# Patient Record
Sex: MALE | Race: WHITE | NOT HISPANIC OR LATINO | Employment: OTHER | URBAN - METROPOLITAN AREA
[De-identification: names, ages, dates, MRNs, and addresses within clinical notes are randomized per-mention and may not be internally consistent; named-entity substitution may affect disease eponyms.]

---

## 2017-01-04 DIAGNOSIS — J44.9 COPD (CHRONIC OBSTRUCTIVE PULMONARY DISEASE): ICD-10-CM

## 2017-01-04 DIAGNOSIS — R53.83 FATIGUE: ICD-10-CM

## 2017-01-04 DIAGNOSIS — G89.29 CHRONIC HEADACHES: Primary | ICD-10-CM

## 2017-01-04 DIAGNOSIS — R51.9 CHRONIC HEADACHES: Primary | ICD-10-CM

## 2017-01-05 ENCOUNTER — HOSPITAL ENCOUNTER (OUTPATIENT)
Dept: RADIOLOGY | Facility: HOSPITAL | Age: 52
Discharge: HOME | End: 2017-01-05
Attending: INTERNAL MEDICINE
Payer: MEDICARE

## 2017-01-05 ENCOUNTER — HOSPITAL ENCOUNTER (OUTPATIENT)
Dept: RESPIRATORY THERAPY | Facility: HOSPITAL | Age: 52
Discharge: HOME | End: 2017-01-05
Attending: INTERNAL MEDICINE
Payer: MEDICARE

## 2017-01-05 DIAGNOSIS — R53.83 FATIGUE: ICD-10-CM

## 2017-01-05 DIAGNOSIS — G89.29 CHRONIC HEADACHES: ICD-10-CM

## 2017-01-05 DIAGNOSIS — R51.9 CHRONIC HEADACHES: ICD-10-CM

## 2017-01-05 DIAGNOSIS — J44.9 COPD (CHRONIC OBSTRUCTIVE PULMONARY DISEASE): ICD-10-CM

## 2017-01-05 PROCEDURE — 94727 GAS DIL/WSHOT DETER LNG VOL: CPT | Mod: 26,,, | Performed by: INTERNAL MEDICINE

## 2017-01-05 PROCEDURE — 94727 GAS DIL/WSHOT DETER LNG VOL: CPT

## 2017-01-05 PROCEDURE — 94060 EVALUATION OF WHEEZING: CPT

## 2017-01-05 PROCEDURE — 94729 DIFFUSING CAPACITY: CPT

## 2017-01-05 PROCEDURE — 70551 MRI BRAIN STEM W/O DYE: CPT | Mod: TC

## 2017-01-05 PROCEDURE — 94060 EVALUATION OF WHEEZING: CPT | Mod: 26,,, | Performed by: INTERNAL MEDICINE

## 2017-01-05 PROCEDURE — 94729 DIFFUSING CAPACITY: CPT | Mod: 26,,, | Performed by: INTERNAL MEDICINE

## 2017-01-05 PROCEDURE — 70551 MRI BRAIN STEM W/O DYE: CPT | Mod: 26,,, | Performed by: RADIOLOGY

## 2017-01-18 NOTE — PROCEDURES
Pulmonary Functions, including spirometry and bronchodilator response and lung volumes and diffusion, study was done Jan 5, 2017.  Spirometry shows severe obstruction and bronchodilator response.   FEV1 is 24% or 0.75 liters.  Lung volumes show  normal TLC and elevated residual volume.  Diffusion shows reduced to 46% uncorrected for anemia if present..    Pulmonary functions show very severe obstruction with bd response and low dlco. Clinical correlation recommended.     Dallas Ocampo M.D.      Copd stages by spirometry with fev1 / fvc < 0.70 (present if report indicates obstruction)     Mild Fev1 80% or more   Moderate 50% to 79%   Severe 30% to 49%   Very severe < 30%

## 2017-03-10 ENCOUNTER — HOSPITAL ENCOUNTER (OUTPATIENT)
Dept: RADIOLOGY | Facility: HOSPITAL | Age: 52
Discharge: HOME | End: 2017-03-10
Attending: INTERNAL MEDICINE
Payer: MEDICARE

## 2017-03-10 DIAGNOSIS — R10.11 RUQ PAIN: ICD-10-CM

## 2017-03-10 PROCEDURE — 76705 ECHO EXAM OF ABDOMEN: CPT | Mod: 26,,, | Performed by: RADIOLOGY

## 2017-03-10 PROCEDURE — 76705 ECHO EXAM OF ABDOMEN: CPT | Mod: TC

## 2017-09-28 DIAGNOSIS — J44.9 COPD (CHRONIC OBSTRUCTIVE PULMONARY DISEASE): Primary | ICD-10-CM

## 2017-09-29 ENCOUNTER — HOSPITAL ENCOUNTER (OUTPATIENT)
Dept: RADIOLOGY | Facility: HOSPITAL | Age: 52
Discharge: HOME | End: 2017-09-29
Attending: INTERNAL MEDICINE
Payer: MEDICARE

## 2017-09-29 DIAGNOSIS — J44.9 COPD (CHRONIC OBSTRUCTIVE PULMONARY DISEASE): ICD-10-CM

## 2017-09-29 PROCEDURE — 71020 XR CHEST PA AND LATERAL: CPT | Mod: 26,,, | Performed by: RADIOLOGY

## 2017-09-29 PROCEDURE — 71020 XR CHEST PA AND LATERAL: CPT | Mod: TC

## 2018-07-26 ENCOUNTER — HOSPITAL ENCOUNTER (OUTPATIENT)
Dept: RADIOLOGY | Facility: HOSPITAL | Age: 53
Discharge: HOME | End: 2018-07-26
Attending: INTERNAL MEDICINE
Payer: MEDICARE

## 2018-07-26 DIAGNOSIS — J44.89 OBSTRUCTIVE CHRONIC BRONCHITIS WITHOUT EXACERBATION: ICD-10-CM

## 2018-07-26 DIAGNOSIS — J44.89 OBSTRUCTIVE CHRONIC BRONCHITIS WITHOUT EXACERBATION: Primary | ICD-10-CM

## 2018-07-26 PROCEDURE — 71046 X-RAY EXAM CHEST 2 VIEWS: CPT | Mod: TC,FY

## 2018-07-26 PROCEDURE — 71046 X-RAY EXAM CHEST 2 VIEWS: CPT | Mod: 26,,, | Performed by: RADIOLOGY

## 2018-11-16 DIAGNOSIS — I65.23 ATHEROSCLEROSIS OF BOTH CAROTID ARTERIES: ICD-10-CM

## 2018-11-16 DIAGNOSIS — I25.10 ASCVD (ARTERIOSCLEROTIC CARDIOVASCULAR DISEASE): Primary | ICD-10-CM

## 2018-11-19 ENCOUNTER — HOSPITAL ENCOUNTER (OUTPATIENT)
Dept: RADIOLOGY | Facility: HOSPITAL | Age: 53
Discharge: HOME | End: 2018-11-19
Attending: INTERNAL MEDICINE
Payer: MEDICARE

## 2018-11-19 DIAGNOSIS — I65.23 ATHEROSCLEROSIS OF BOTH CAROTID ARTERIES: ICD-10-CM

## 2018-11-19 PROCEDURE — 93880 EXTRACRANIAL BILAT STUDY: CPT | Mod: TC

## 2018-11-19 PROCEDURE — 93880 EXTRACRANIAL BILAT STUDY: CPT | Mod: 26,,, | Performed by: RADIOLOGY

## 2019-01-21 ENCOUNTER — HOSPITAL ENCOUNTER (OUTPATIENT)
Dept: RADIOLOGY | Facility: HOSPITAL | Age: 54
Discharge: HOME | End: 2019-01-21
Attending: INTERNAL MEDICINE
Payer: MEDICARE

## 2019-01-21 DIAGNOSIS — R06.02 SHORTNESS OF BREATH: Primary | ICD-10-CM

## 2019-01-21 DIAGNOSIS — R06.02 SHORTNESS OF BREATH: ICD-10-CM

## 2019-01-21 PROCEDURE — 71046 X-RAY EXAM CHEST 2 VIEWS: CPT | Mod: 26,,, | Performed by: RADIOLOGY

## 2019-01-21 PROCEDURE — 71046 X-RAY EXAM CHEST 2 VIEWS: CPT | Mod: TC,FY

## 2019-01-21 PROCEDURE — 71046 XR CHEST PA AND LATERAL: ICD-10-PCS | Mod: 26,,, | Performed by: RADIOLOGY

## 2019-08-14 ENCOUNTER — TELEPHONE (OUTPATIENT)
Dept: TRANSPLANT | Facility: CLINIC | Age: 54
End: 2019-08-14

## 2019-08-14 DIAGNOSIS — J44.9 CHRONIC OBSTRUCTIVE PULMONARY DISEASE, UNSPECIFIED COPD TYPE: Primary | ICD-10-CM

## 2019-08-14 DIAGNOSIS — Z76.82 LUNG TRANSPLANT CANDIDATE: ICD-10-CM

## 2019-08-14 NOTE — LETTER
08/22/2019    Kush Ibanez  [address]  [facility] [address]  Phone: [phone]  Fax: [phone]         Dear Dr. Kush Ibanez    Patient: Erick Graves Jr.     MR Number: [id number]     YOB: 1965       Thank you for the referral of Erick Graves Jr. to our lung transplant program. An initial appointment with the transplant team has been scheduled for 9/12/19. You will receive an after-visit summary following the completion of your patients appointment in our clinic.    Thank you again for your trust in our program. If there is anything we can do for you or your staff, please feel free to contact us at [phone].    Sincerely,         Sharif Rao MD   Director, Lung Transplantation   Pulmonary & Critical Care Medicine    [facility]  [address] [place] [id number]  [phone]

## 2019-08-14 NOTE — TELEPHONE ENCOUNTER
LM for patient regarding scheduling consult appointment.    Pt's wife returned my call.  He will be scheduled for 9/12 based on testing availability.  Scheduling card placed on 's desk.    [facility]   Caller: Eli / Wife [phone] (Today, 11:54 AM)             Eli called to confirm if a referral was received last week from Dr. Tyler Ibanez. Eli can be reached at [phone].      Explained that I do have the referral, however was waiting on additional info from the referring MD's nurse.  Explained that if patient is still smoking, he will need to be abstinent for at least 6 months to move forward with evaluation.  She states he is aware of that and has not smoked since last week.  Explained that if patient is a candidate to move forward with evaluation testing for possible listing, he will need a Medicare supplement in order to do so.  Explained that open enrollment starts in October for a plan to begin in January.  Will review with Dr. Rao.

## 2019-09-12 ENCOUNTER — HOSPITAL ENCOUNTER (OUTPATIENT)
Dept: CARDIOLOGY | Facility: CLINIC | Age: 54
Discharge: HOME | End: 2019-09-12
Attending: INTERNAL MEDICINE
Payer: MEDICARE

## 2019-09-12 ENCOUNTER — HOSPITAL ENCOUNTER (OUTPATIENT)
Dept: RADIOLOGY | Facility: HOSPITAL | Age: 54
Discharge: HOME | End: 2019-09-12
Attending: INTERNAL MEDICINE
Payer: MEDICARE

## 2019-09-12 ENCOUNTER — INITIAL CONSULT (OUTPATIENT)
Dept: TRANSPLANT | Facility: CLINIC | Age: 54
End: 2019-09-12
Payer: MEDICARE

## 2019-09-12 ENCOUNTER — HOSPITAL ENCOUNTER (OUTPATIENT)
Dept: PULMONOLOGY | Facility: CLINIC | Age: 54
Discharge: HOME | End: 2019-09-12
Payer: MEDICARE

## 2019-09-12 VITALS
HEIGHT: 67 IN | DIASTOLIC BLOOD PRESSURE: 80 MMHG | BODY MASS INDEX: 26.33 KG/M2 | WEIGHT: 167.75 LBS | BODY MASS INDEX: 28.25 KG/M2 | WEIGHT: 180 LBS | HEIGHT: 67 IN | SYSTOLIC BLOOD PRESSURE: 120 MMHG | HEART RATE: 60 BPM

## 2019-09-12 VITALS
TEMPERATURE: 98 F | HEART RATE: 60 BPM | WEIGHT: 167 LBS | BODY MASS INDEX: 26.21 KG/M2 | HEIGHT: 67 IN | OXYGEN SATURATION: 98 % | RESPIRATION RATE: 20 BRPM | DIASTOLIC BLOOD PRESSURE: 60 MMHG | SYSTOLIC BLOOD PRESSURE: 114 MMHG

## 2019-09-12 DIAGNOSIS — Z76.82 LUNG TRANSPLANT CANDIDATE: ICD-10-CM

## 2019-09-12 DIAGNOSIS — J44.9 CHRONIC OBSTRUCTIVE PULMONARY DISEASE, UNSPECIFIED COPD TYPE: ICD-10-CM

## 2019-09-12 DIAGNOSIS — J44.9 CHRONIC OBSTRUCTIVE PULMONARY DISEASE, UNSPECIFIED COPD TYPE: Primary | ICD-10-CM

## 2019-09-12 LAB
ASCENDING AORTA: 3.13 CM
AV INDEX (PROSTH): 0.71
AV MEAN GRADIENT: 2 MMHG
AV PEAK GRADIENT: 4 MMHG
AV VALVE AREA: 2.29 CM2
AV VELOCITY RATIO: 0.75
BSA FOR ECHO PROCEDURE: 1.96 M2
CV ECHO LV RWT: 0.5 CM
DOP CALC AO PEAK VEL: 1 M/S
DOP CALC AO VTI: 23.54 CM
DOP CALC LVOT AREA: 3.2 CM2
DOP CALC LVOT DIAMETER: 2.03 CM
DOP CALC LVOT PEAK VEL: 0.75 M/S
DOP CALC LVOT STROKE VOLUME: 53.93 CM3
DOP CALCLVOT PEAK VEL VTI: 16.67 CM
E WAVE DECELERATION TIME: 153.89 MSEC
E/A RATIO: 1.07
ECHO LV POSTERIOR WALL: 0.83 CM (ref 0.6–1.1)
FRACTIONAL SHORTENING: 16 % (ref 28–44)
INTERVENTRICULAR SEPTUM: 0.79 CM (ref 0.6–1.1)
IVRT: 0.11 MSEC
LA MAJOR: 5.54 CM
LA MINOR: 5.37 CM
LA WIDTH: 4.8 CM
LEFT ATRIUM SIZE: 4.16 CM
LEFT ATRIUM VOLUME INDEX: 47.9 ML/M2
LEFT ATRIUM VOLUME: 92.56 CM3
LEFT INTERNAL DIMENSION IN SYSTOLE: 2.78 CM (ref 2.1–4)
LEFT VENTRICLE DIASTOLIC VOLUME INDEX: 22.65 ML/M2
LEFT VENTRICLE DIASTOLIC VOLUME: 43.79 ML
LEFT VENTRICLE MASS INDEX: 36 G/M2
LEFT VENTRICLE SYSTOLIC VOLUME INDEX: 15 ML/M2
LEFT VENTRICLE SYSTOLIC VOLUME: 28.93 ML
LEFT VENTRICULAR INTERNAL DIMENSION IN DIASTOLE: 3.29 CM (ref 3.5–6)
LEFT VENTRICULAR MASS: 69.46 G
MV PEAK A VEL: 0.59 M/S
MV PEAK E VEL: 0.63 M/S
PULM VEIN S/D RATIO: 1.38
PV PEAK D VEL: 0.39 M/S
PV PEAK S VEL: 0.54 M/S
RA MAJOR: 4.61 CM
RA PRESSURE: 3 MMHG
RA WIDTH: 4.03 CM
RIGHT VENTRICULAR END-DIASTOLIC DIMENSION: 3.45 CM
RV TISSUE DOPPLER FREE WALL SYSTOLIC VELOCITY 1 (APICAL 4 CHAMBER VIEW): 9.56 CM/S
SINUS: 3.49 CM
STJ: 3.02 CM
TRICUSPID ANNULAR PLANE SYSTOLIC EXCURSION: 2.02 CM

## 2019-09-12 PROCEDURE — 71250 CT THORAX DX C-: CPT | Mod: TC,TXP

## 2019-09-12 PROCEDURE — 94618 PULMONARY STRESS TESTING: CPT | Mod: PBBFAC | Performed by: INTERNAL MEDICINE

## 2019-09-12 PROCEDURE — 99204 PR OFFICE/OUTPT VISIT, NEW, LEVL IV, 45-59 MIN: ICD-10-PCS | Mod: 25,S$PBB,TXP, | Performed by: INTERNAL MEDICINE

## 2019-09-12 PROCEDURE — 94729 PR C02/MEMBANE DIFFUSE CAPACITY: ICD-10-PCS | Mod: 26,S$PBB,, | Performed by: INTERNAL MEDICINE

## 2019-09-12 PROCEDURE — 94727 GAS DIL/WSHOT DETER LNG VOL: CPT | Mod: 26,S$PBB,, | Performed by: INTERNAL MEDICINE

## 2019-09-12 PROCEDURE — 93306 TRANSTHORACIC ECHO (TTE) COMPLETE (CUPID ONLY): ICD-10-PCS | Mod: 26,S$PBB,NTX, | Performed by: INTERNAL MEDICINE

## 2019-09-12 PROCEDURE — 94618 PULMONARY STRESS TESTING: CPT | Mod: 26,S$PBB,, | Performed by: INTERNAL MEDICINE

## 2019-09-12 PROCEDURE — 71250 CT THORAX DX C-: CPT | Mod: 26,,, | Performed by: RADIOLOGY

## 2019-09-12 PROCEDURE — 93306 TTE W/DOPPLER COMPLETE: CPT | Mod: PBBFAC,NTX | Performed by: INTERNAL MEDICINE

## 2019-09-12 PROCEDURE — 94727 GAS DIL/WSHOT DETER LNG VOL: CPT | Mod: PBBFAC | Performed by: INTERNAL MEDICINE

## 2019-09-12 PROCEDURE — 99999 PR PBB SHADOW E&M-EST. PATIENT-LVL III: ICD-10-PCS | Mod: PBBFAC,TXP,, | Performed by: INTERNAL MEDICINE

## 2019-09-12 PROCEDURE — 94618 PULMONARY STRESS TESTING: ICD-10-PCS | Mod: 26,S$PBB,, | Performed by: INTERNAL MEDICINE

## 2019-09-12 PROCEDURE — 94727 PR PULM FUNCTION TEST BY GAS: ICD-10-PCS | Mod: 26,S$PBB,, | Performed by: INTERNAL MEDICINE

## 2019-09-12 PROCEDURE — 94729 DIFFUSING CAPACITY: CPT | Mod: 26,S$PBB,, | Performed by: INTERNAL MEDICINE

## 2019-09-12 PROCEDURE — 94010 BREATHING CAPACITY TEST: CPT | Mod: PBBFAC | Performed by: INTERNAL MEDICINE

## 2019-09-12 PROCEDURE — 71250 CT CHEST WITHOUT CONTRAST: ICD-10-PCS | Mod: 26,,, | Performed by: RADIOLOGY

## 2019-09-12 PROCEDURE — 99999 PR PBB SHADOW E&M-EST. PATIENT-LVL III: CPT | Mod: PBBFAC,TXP,, | Performed by: INTERNAL MEDICINE

## 2019-09-12 PROCEDURE — 94010 BREATHING CAPACITY TEST: ICD-10-PCS | Mod: 26,S$PBB,, | Performed by: INTERNAL MEDICINE

## 2019-09-12 PROCEDURE — 94010 BREATHING CAPACITY TEST: CPT | Mod: 26,S$PBB,, | Performed by: INTERNAL MEDICINE

## 2019-09-12 PROCEDURE — 99204 OFFICE O/P NEW MOD 45 MIN: CPT | Mod: 25,S$PBB,TXP, | Performed by: INTERNAL MEDICINE

## 2019-09-12 PROCEDURE — 94729 DIFFUSING CAPACITY: CPT | Mod: PBBFAC | Performed by: INTERNAL MEDICINE

## 2019-09-12 PROCEDURE — 99213 OFFICE O/P EST LOW 20 MIN: CPT | Mod: PBBFAC,25,TXP | Performed by: INTERNAL MEDICINE

## 2019-09-12 RX ORDER — ATENOLOL 25 MG/1
1 TABLET ORAL DAILY
COMMUNITY
End: 2021-06-02 | Stop reason: SDUPTHER

## 2019-09-12 RX ORDER — FLUOXETINE HYDROCHLORIDE 20 MG/1
1 CAPSULE ORAL DAILY
COMMUNITY
Start: 2019-09-06 | End: 2021-06-02

## 2019-09-12 RX ORDER — ALBUTEROL SULFATE 90 UG/1
1-2 AEROSOL, METERED RESPIRATORY (INHALATION)
COMMUNITY
Start: 2019-09-03 | End: 2022-06-14

## 2019-09-12 RX ORDER — CLINDAMYCIN HYDROCHLORIDE 300 MG/1
1 CAPSULE ORAL 3 TIMES DAILY
Refills: 0 | COMMUNITY
Start: 2019-08-30 | End: 2021-06-02

## 2019-09-12 NOTE — LETTER
September 13, 2019        Kush Ibanez  [address] [facility] [address]  Phone: [phone]  Fax: [phone]             [facility]  [address]  [facility] [address]  Phone: [phone]   Patient: Erick Graves Jr.   MR Number: [id number]   YOB: 1965   Date of Visit: 9/12/2019       Dear Dr. Kush Ibanez    Thank you for referring Erick Graves to me for evaluation. Attached you will find relevant portions of my assessment and plan of care.    If you have questions, please do not hesitate to call me. I look forward to following Erick Graves along with you.    Sincerely,    Sharif Rao MD    Enclosure    If you would like to receive this communication electronically, please contact [email or web address] or [phone] to request [employer] Link access.    [employer] is a tool which provides read-only access to select patient information with whom you have a relationship. Its easy to use and provides real time access to review your patients record including encounter summaries, notes, results, and demographic information.    If you feel you have received this communication in error or would no longer like to receive these types of communications, please e-mail [email or web address]

## 2019-09-13 LAB
DLCO ADJ PRE: 18.76 ML/(MIN*MMHG) (ref 20.62–34.48)
DLCO SINGLE BREATH LLN: 20.62
DLCO SINGLE BREATH PRE REF: 68.1 %
DLCO SINGLE BREATH REF: 27.55
DLCOC SBVA LLN: 2.93
DLCOC SBVA PRE REF: 92 %
DLCOC SBVA REF: 4.24
DLCOC SINGLE BREATH LLN: 20.62
DLCOC SINGLE BREATH PRE REF: 68.1 %
DLCOC SINGLE BREATH REF: 27.55
DLCOCSBVAULN: 5.54
DLCOCSINGLEBREATHULN: 34.48
DLCOSINGLEBREATHULN: 34.48
DLCOVA LLN: 2.93
DLCOVA PRE REF: 92 %
DLCOVA PRE: 3.9 ML/(MIN*MMHG*L) (ref 2.93–5.54)
DLCOVA REF: 4.24
DLCOVAULN: 5.54
DLVAADJ PRE: 3.9 ML/(MIN*MMHG*L) (ref 2.93–5.54)
ERVN2 LLN: 1.18
ERVN2 PRE REF: 17 %
ERVN2 PRE: 0.2 L (ref 1.18–1.18)
ERVN2 REF: 1.18
ERVN2ULN: 1.18
FEF 25 75 LLN: 1.56
FEF 25 75 PRE REF: 11.4 %
FEF 25 75 REF: 3.03
FEV05 LLN: 1.47
FEV05 REF: 2.61
FEV1 FVC LLN: 67
FEV1 FVC PRE REF: 53 %
FEV1 FVC REF: 79
FEV1 LLN: 2.61
FEV1 PRE REF: 25.9 %
FEV1 REF: 3.4
FRCN2 LLN: 2.4
FRCN2 PRE REF: 108.5 %
FRCN2 REF: 3.38
FRCN2ULN: 4.37
FVC LLN: 3.33
FVC PRE REF: 48.8 %
FVC REF: 4.32
IVC PRE: 2.07 L (ref 3.33–5.3)
IVC SINGLE BREATH LLN: 3.33
IVC SINGLE BREATH PRE REF: 47.9 %
IVC SINGLE BREATH REF: 4.32
IVCSINGLEBREATHULN: 5.3
PEF LLN: 6.76
PEF PRE REF: 23.3 %
PEF REF: 8.88
PRE DLCO: 18.76 ML/(MIN*MMHG) (ref 20.62–34.48)
PRE FEF 25 75: 0.35 L/S (ref 1.56–4.49)
PRE FET 100: 8.82 SEC
PRE FEV05 REF: 22.3 %
PRE FEV1 FVC: 41.76 % (ref 67.25–90.31)
PRE FEV1: 0.88 L (ref 2.61–4.19)
PRE FEV5: 0.58 L (ref 1.47–3.74)
PRE FRC N2: 3.67 L
PRE FVC: 2.1 L (ref 3.33–5.3)
PRE PEF: 2.06 L/S (ref 6.76–11)
RVN2 LLN: 1.53
RVN2 PRE REF: 157.2 %
RVN2 PRE: 3.47 L (ref 1.53–2.88)
RVN2 REF: 2.21
RVN2TLCN2 LLN: 26.43
RVN2TLCN2 PRE REF: 177.2 %
RVN2TLCN2 PRE: 62.75 % (ref 26.43–44.39)
RVN2TLCN2 REF: 35.41
RVN2TLCN2ULN: 44.39
RVN2ULN: 2.88
TLCN2 LLN: 5.35
TLCN2 PRE REF: 85 %
TLCN2 PRE: 5.53 L (ref 5.35–7.65)
TLCN2 REF: 6.5
TLCN2ULN: 7.65
VA PRE: 4.82 L (ref 6.35–6.35)
VA SINGLE BREATH LLN: 6.35
VA SINGLE BREATH PRE REF: 75.8 %
VA SINGLE BREATH REF: 6.35
VASINGLEBREATHULN: 6.35
VCMAXN2 LLN: 3.33
VCMAXN2 PRE REF: 47.7 %
VCMAXN2 PRE: 2.06 L (ref 3.33–5.3)
VCMAXN2 REF: 4.32
VCMAXN2ULN: 5.3

## 2019-09-13 NOTE — PROGRESS NOTES
LUNG TRANSPLANT INITIAL EVALUATION                                                                                                                                           Reason for Visit:  Evaluation for lung transplant    Referring Physician: Kush Ibanez MD    History of Present Illness: Erick Graves Jr. is a 54 y.o. male who is on 0L of oxygen.  He is on no assisted ventilation.  His New York Heart Association Class is II and a Karnofsky score of 80% - Normal activity with effort: some symptoms of disease. He is not diabetic. He presents today for his initial evaluation for lung transplant. He carries a diagnosis of COPD.     His diagnosis was made 16 years ago. He says that at that moment he developed right sided chest pain and went to the ED where it was thought he was having a pulmonary embolism. During the evaluation it was determined that he did not have a PE and what he had was COPD with an acute infectious process. He was prescribed antibiotics and a rescue inhaler. He did not have other issues with his condition until six years ago when he was hospitalized for it. He did not require BiPAP or ICU stay during his admission. It has been the only hospitalization he's had for his condition. He now uses Symbicort for his condition.     His symptoms are shortness of breath on exertion and a cough which he describes more as a clearing of his throat. Denies fever, chest pain, night sweats, or weight loss. He says that about three years ago a nodule was found on his lungs but it was ruled as benign.     He has an 80 pack year smoking history and quit 1 month ago.     Past Medical History:   Diagnosis Date    COPD (chronic obstructive pulmonary disease)     Hepatitis     As a child turned jaundice    Hypertension     Peptic ulcer disease        History reviewed. No pertinent surgical history.    Allergies: Codeine; Crestor [rosuvastatin]; Lipitor [atorvastatin]; and Penicillins    Current  Outpatient Medications   Medication Sig    alprazolam (XANAX) 1 MG tablet Take 1 tablet (1 mg total) by mouth 2 (two) times daily.    aspirin (ASPIRIN LOW DOSE) 81 MG EC tablet Every day    atenolol (TENORMIN) 25 MG tablet Take 1 tablet by mouth once daily.    budesonide-formoterol 160-4.5 mcg (SYMBICORT) 160-4.5 mcg/actuation HFAA Inhale 2 puffs into the lungs every 12 (twelve) hours.    clindamycin (CLEOCIN) 300 MG capsule Take 1 capsule by mouth 3 (three) times daily.    FLUoxetine 20 MG capsule Take 1 capsule by mouth once daily.    nicotine polacrilex (COMMIT) 2 MG Lozg Take 2 mg by mouth as needed.    ranitidine (ZANTAC) 150 MG tablet Take 150 mg by mouth as needed for Heartburn.    VENTOLIN HFA 90 mcg/actuation inhaler Inhale 1-2 puffs into the lungs every 4 to 6 hours as needed.     No current facility-administered medications for this visit.          There is no immunization history on file for this patient.  Family History:  History reviewed. No pertinent family history.  Social History     Substance and Sexual Activity   Alcohol Use No    Comment: occasional      Social History     Substance and Sexual Activity   Drug Use No    Comment: 18 - cocaine and crack use - quit 17-18 years ago      Social History     Socioeconomic History    Marital status:      Spouse name: Not on file    Number of children: Not on file    Years of education: Not on file    Highest education level: Not on file   Occupational History    Not on file   Social Needs    Financial resource strain: Not on file    Food insecurity:     Worry: Not on file     Inability: Not on file    Transportation needs:     Medical: Not on file     Non-medical: Not on file   Tobacco Use    Smoking status: Former Smoker     Packs/day: 0.50     Years: 34.00     Pack years: 17.00     Types: Cigarettes     Last attempt to quit: 2019     Years since quittin.0    Smokeless tobacco: Never Used   Substance and Sexual  "Activity    Alcohol use: No     Comment: occasional    Drug use: No     Comment: 9/12/19 - cocaine and crack use - quit 17-18 years ago    Sexual activity: Not on file   Lifestyle    Physical activity:     Days per week: Not on file     Minutes per session: Not on file    Stress: Not on file   Relationships    Social connections:     Talks on phone: Not on file     Gets together: Not on file     Attends Rastafarian service: Not on file     Active member of club or organization: Not on file     Attends meetings of clubs or organizations: Not on file     Relationship status: Not on file   Other Topics Concern    Not on file   Social History Narrative    Not on file     Review of Systems   Constitutional: Negative for chills, diaphoresis, fever, malaise/fatigue and weight loss.   HENT: Negative for congestion, ear discharge, ear pain, hearing loss, nosebleeds and sore throat.    Eyes: Negative for blurred vision, double vision and photophobia.   Respiratory: Positive for cough and shortness of breath. Negative for hemoptysis, sputum production and wheezing.    Cardiovascular: Negative for chest pain, palpitations, orthopnea, claudication, leg swelling and PND.   Gastrointestinal: Negative for abdominal pain, blood in stool, constipation, diarrhea, heartburn, melena, nausea and vomiting.   Genitourinary: Negative for dysuria, flank pain, frequency, hematuria and urgency.   Musculoskeletal: Negative for back pain, falls, joint pain, myalgias and neck pain.   Skin: Negative for itching and rash.   Neurological: Negative for dizziness, tremors, sensory change, loss of consciousness, weakness and headaches.   Endo/Heme/Allergies: Does not bruise/bleed easily.   Psychiatric/Behavioral: Negative for depression, hallucinations and memory loss. The patient is not nervous/anxious and does not have insomnia.      Vitals  /60   Pulse 60   Temp 97.5 °F (36.4 °C) (Oral)   Resp 20   Ht 5' 7" (1.702 m)   Wt 75.8 kg " (167 lb)   SpO2 98% Comment: room air  BMI 26.16 kg/m²      Physical Exam   Constitutional: He is oriented to person, place, and time and well-developed, well-nourished, and in no distress. No distress.   HENT:   Head: Normocephalic.   Nose: Nose normal.   Mouth/Throat: Oropharynx is clear and moist. No oropharyngeal exudate.   Eyes: Pupils are equal, round, and reactive to light. Conjunctivae and EOM are normal. No scleral icterus.   Neck: Normal range of motion. Neck supple. No JVD present. No tracheal deviation present. No thyromegaly present.   Cardiovascular: Normal rate, regular rhythm and intact distal pulses. Exam reveals no gallop and no friction rub.   No murmur heard.  Pulmonary/Chest: Effort normal. No stridor. No respiratory distress. He has no wheezes. He has no rales. He exhibits no tenderness.   Abdominal: Soft. Bowel sounds are normal. He exhibits no distension and no mass. There is no tenderness. There is no rebound and no guarding.   Musculoskeletal: Normal range of motion. He exhibits no edema.   Lymphadenopathy:     He has no cervical adenopathy.   Neurological: He is alert and oriented to person, place, and time. No cranial nerve deficit. Gait normal. Coordination normal.   Skin: Skin is warm and dry. No rash noted. He is not diaphoretic. No erythema. No pallor.   Psychiatric: Mood and affect normal.       Labs:      Pulmonary Function Tests 9/12/2019 1/5/2017   FVC 2.11 2.26   FEV1 0.88 1.07   TLC (liters) 5.53 5.19   DLCO (ml/mmHg sec) 18.8 13.14   FVC% 49 49   FEV1% 26 43   TLC% 85 91   RV 3.47 3.09   RV% 157 165   DLCO% 68 48     6MW 9/12/2019   6MWT Status completed without stopping   Patient Reported Chest pain;Dyspnea   Was O2 used? No   6MW Distance walked (feet) 1500   Distance walked (meters) 457.2   Did patient stop? No   Oxygen Saturation 99   Supplemental Oxygen Room Air   Heart Rate 56   Blood Pressure 124/71   Ronnie Dyspnea Rating  moderate   Oxygen Saturation 99   Supplemental  Oxygen Room Air   Heart Rate 78   Blood Pressure 113/68   Ronnie Dyspnea Rating  heavy   Recovery Time (seconds) 44   Oxygen Saturation 99   Supplemental Oxygen Room Air   Heart Rate 69       Imaging:  Results for orders placed during the hospital encounter of 09/12/19   CT CHEST WITHOUT CONTRAST    Narrative EXAMINATION:  CT CHEST WITHOUT CONTRAST    CLINICAL HISTORY:  lung transplant consult; Chronic obstructive pulmonary disease, unspecified    TECHNIQUE:  Using low dose technique the chest was surveyed from above the pulmonary apices through the posterior costophrenic angles.  Data was reconstructed for multiplanar images in axial, sagittal and coronal planes and for maximal intensity projection images in the axial plane.    All CT scans at this facility use dose modulation, iterative reconstruction and/or weight based dosing when appropriate to reduce radiation dose to as low as reasonably achievable.    Xray dose: DLP = 269.84 mGy-cm.    COMPARISON:  CT chest: 07/25/2016, 08/01/2011, 02/24/2011    CT abdomen 11/18/2011    FINDINGS:  Base of Neck: No significant abnormality.    Aorta: Left-sided aortic arch with 3 arterial branches. The aorta maintains normal caliber, contour and course. There is a 0.7 cm focal area of aortic atherosclerosis in the aortic arch, otherwise no identifiable aortic atherosclerosis.  No coronary artery calcification.    Heart/pericardium: Normal size.  No pericardial effusion or calcification.    Pulmonary vasculature: Pulmonary arteries distribute normally.  There are four pulmonary veins.    Shawna/Mediastinum: No pathologic aris enlargement.    Pleura: No pleural fluid or thickening.No pleural calcification.    Esophagus: Normal.    Upper Abdomen: 1.0 x 1.0 cm hypoattenuating lesion in the right lobe of the liver.  In comparison with 07/25/2016 abdominal CT with contrast, this lesion is consistent with a hemangioma and is stable in size.    Thoracic soft tissues: Normal. Both breasts  are present.    Bones: No acute fracture. No suspicious lytic or sclerotic lesion.    Airways: Patent.    Lungs: Small, subpleural and apical blebs consistent with paraseptal emphysema.  No detrimental change in appearance of these cystic lucencies compared to prior CT dated 07/25/2016.  Additionally there is minimal centrilobular emphysema noted.  Lungs are clear without consolidation or pleural fluid.      Impression 1. Apical paraseptal emphysematous blebs most prominent in the bilateral apices, no detrimental change compared to 2016 chest CT.  2. Minimal centrilobular emphysema.  No acute cardiopulmonary disease  3. 1.0 cm stable hemangioma in the right lobe of the liver.    Electronically signed by resident: Cas Lopez  Date:    09/12/2019  Time:    09:44    Electronically signed by: Domenic Beaver MD  Date:    09/12/2019  Time:    10:33       Cardiodiagnostics:  Echo 9/12/2019  · Concentric left ventricular remodeling.  · Normal left ventricular systolic function. The estimated ejection fraction is 60%  · Normal right ventricular systolic function.  · Normal LV diastolic function.  · Moderate left atrial enlargement.  · Normal central venous pressure (3 mm Hg).       Assessment:  1. Chronic obstructive pulmonary disease, unspecified COPD type    2. Lung transplant candidate      Plan:   1. I would continue his current therapy for his COPD which is GOLD 4A and his PETER score is 3 which predicts a 67% 4 year survival with his condition. An argument can be made to change him for a LABA/ICS to a LABA/LAMA since he does not have frequent exacerbation. Double bronchodilator coverage can improve his symptoms of dyspnea. He would also benefit from pulmonary rehabilitation.    2. With a PETER score of 3, absence of pulmonary hypertension, and lack of hypercapnia the risk of transplant will outweigh the benefits. He is not a candidate for workup and I explained to him that is he remains abstinent from tobacco, and  avoids infections he might never need one.    Thank you for allowing me to participate in the care of your patient. Please, do not hesitate to contact me if you have any questions.      Sharif Rao MD  Medical Director of Lung Transplantation  [facility]

## 2019-09-17 NOTE — PROCEDURES
Erick Graves Jr. is a 54 y.o.  male patient, who presents for a 6 minute walk test ordered by Nasim Rao MD.  The diagnosis is Pre Lung Transplant Evaluation; COPD/Emphysema.  The patient's BMI is 26.3 kg/m2.  Predicted distance (lower limit of normal) is 457.76 meters.      Test Results:    The test was completed without stopping.  The total time walked was 360 seconds.  During walking, the patient reported:  Chest pain, Dyspnea.  The patient used no assistive devices during testing.     09/12/2019---------Distance: 457.2 meters (1500 feet)     O2 Sat % Supplemental Oxygen Heart Rate Blood Pressure Ronnie Scale   Pre-exercise  (Resting) 99 % Room Air 56 bpm 124/71 mmHg 3   During Exercise 99 % Room Air 78 bpm 113/68 mmHg 5-6   Post-exercise  (Recovery) 99 % Room Air  69 bpm       Recovery Time:  44 seconds    Performing nurse/tech:  James FERRER      PREVIOUS STUDY:   The patient has not had a previous study.      CLINICAL INTERPRETATION:  Six minute walk distance is 457.2 meters (1500 feet) with heavy dyspnea.  During exercise, there was no desaturation while breathing room air.  Blood pressure remained stable and Heart rate increased significantly with walking.  Bradycardia was present prior to exercise.  The patient reported non-pulmonary symptoms during exercise.  No previous study performed.  Based upon age and body mass index, exercise capacity is normal.

## 2020-07-16 ENCOUNTER — OFFICE VISIT (OUTPATIENT)
Dept: UROLOGY | Facility: CLINIC | Age: 55
End: 2020-07-16
Payer: MEDICARE

## 2020-07-16 VITALS
BODY MASS INDEX: 29.76 KG/M2 | DIASTOLIC BLOOD PRESSURE: 71 MMHG | HEIGHT: 67 IN | HEART RATE: 70 BPM | RESPIRATION RATE: 18 BRPM | TEMPERATURE: 98 F | SYSTOLIC BLOOD PRESSURE: 117 MMHG | WEIGHT: 189.63 LBS

## 2020-07-16 DIAGNOSIS — R39.9 LOWER URINARY TRACT SYMPTOMS (LUTS): ICD-10-CM

## 2020-07-16 DIAGNOSIS — N52.9 ERECTILE DYSFUNCTION, UNSPECIFIED ERECTILE DYSFUNCTION TYPE: Primary | ICD-10-CM

## 2020-07-16 DIAGNOSIS — Z12.5 SCREENING FOR PROSTATE CANCER: ICD-10-CM

## 2020-07-16 PROCEDURE — 99204 PR OFFICE/OUTPT VISIT, NEW, LEVL IV, 45-59 MIN: ICD-10-PCS | Mod: S$PBB,,, | Performed by: UROLOGY

## 2020-07-16 PROCEDURE — 99213 OFFICE O/P EST LOW 20 MIN: CPT | Mod: PBBFAC,PN | Performed by: UROLOGY

## 2020-07-16 PROCEDURE — 99999 PR PBB SHADOW E&M-EST. PATIENT-LVL III: ICD-10-PCS | Mod: PBBFAC,,, | Performed by: UROLOGY

## 2020-07-16 PROCEDURE — 99204 OFFICE O/P NEW MOD 45 MIN: CPT | Mod: S$PBB,,, | Performed by: UROLOGY

## 2020-07-16 PROCEDURE — 99999 PR PBB SHADOW E&M-EST. PATIENT-LVL III: CPT | Mod: PBBFAC,,, | Performed by: UROLOGY

## 2020-07-16 RX ORDER — LEVALBUTEROL TARTRATE 45 UG/1
AEROSOL, METERED ORAL
COMMUNITY
End: 2021-06-02 | Stop reason: SDUPTHER

## 2020-07-16 RX ORDER — TADALAFIL 5 MG/1
5 TABLET ORAL DAILY PRN
Qty: 30 TABLET | Refills: 3 | Status: SHIPPED | OUTPATIENT
Start: 2020-07-16 | End: 2021-06-02

## 2020-07-16 NOTE — PROGRESS NOTES
[facility]rology New Patient/H&P:    Erick Graves Jr. is a 55 y.o. male who presents for erectile dysfunction and lower urinary tract symptoms.    Patient with a 1 year history of severe erectile dysfunction that has been progressively worsening. He states his erections are not sufficient for sexual intercourse.     He states he has not attempted any medications for his erections. No DM. No recent drug use. States he did use cocaine, ecstasy and marijuana over 30 years ago.     Patient also reports moderately bothersome lower urinary tract symptoms. He reports urgency, weak stream, dribbling and incomplete emptying for approximately 2 years.     No gross hematuria, fever, chills, flank pain, nephrolithiasis, urinary tract infection,  trauma or history of  malignancy.       PSA  0.4  3/13/14  1.8  11    Past Medical History:   Diagnosis Date    COPD (chronic obstructive pulmonary disease)     Hepatitis     As a child turned jaundice    Hypertension     Peptic ulcer disease        History reviewed. No pertinent surgical history.    History reviewed. No pertinent family history.    Social History     Socioeconomic History    Marital status:      Spouse name: Not on file    Number of children: Not on file    Years of education: Not on file    Highest education level: Not on file   Occupational History    Not on file   Social Needs    Financial resource strain: Not on file    Food insecurity     Worry: Not on file     Inability: Not on file    Transportation needs     Medical: Not on file     Non-medical: Not on file   Tobacco Use    Smoking status: Former Smoker     Packs/day: 0.50     Years: 34.00     Pack years: 17.00     Types: Cigarettes     Quit date: 2019     Years since quittin.9    Smokeless tobacco: Never Used   Substance and Sexual Activity    Alcohol use: No     Comment: occasional    Drug use: No     Comment: 18 - cocaine and crack use - quit  17-18 years ago    Sexual activity: Not on file   Lifestyle    Physical activity     Days per week: Not on file     Minutes per session: Not on file    Stress: Not on file   Relationships    Social connections     Talks on phone: Not on file     Gets together: Not on file     Attends Jainism service: Not on file     Active member of club or organization: Not on file     Attends meetings of clubs or organizations: Not on file     Relationship status: Not on file   Other Topics Concern    Not on file   Social History Narrative    Not on file       Review of patient's allergies indicates:   Allergen Reactions    Codeine      Other reaction(s): Unknown    Crestor [rosuvastatin] Other (See Comments)     Stomach soreness    Lipitor [atorvastatin] Other (See Comments)     Stomach soreness      Penicillins      Other reaction(s): Unknown       Medications Reviewed: see MAR    ROS:    Constitutional: denies fevers, chills, night sweats, fatigue, malaise  Respiratory: negative for cough, shortness of breath, wheezing, dyspnea.  Cardiovascular:  negative for chest pain, varicose veins, ankle swelling, palpitations, syncope.  GI: negative for abdominal pain, heartburn, indigestion, nausea, vomiting, constipation, diarrhea, blood in stool.   Urology: as noted above in HPI  Endocrinology: negative for cold intolerance, excessive thirst, not feeling tired/sluggish, no heat intolerance.   Hematology/Lymph: negative for easy bleeding, easy bruising, swollen glands.  Musculoskeletal: negative for back pain, joint pain, joint swelling, neck pain.  Allergy-Immunology: negative for seasonal allergies, negative for unusual infections.   Skin: negative for boils, breast lumps, hives, itching, rash.   Neurology: negative for, dizziness, headache, tingling/numbness, tremors.   Psych: satisfied with life; negative for, anxiety, depression, suicidal thoughts.     PHYSICAL EXAM:    Vitals:    07/16/20 1119   BP: 117/71   Pulse: 70  "  Resp: 18   Temp: 98 °F (36.7 °C)     Body mass index is 29.69 kg/m². Weight: 86 kg (189 lb 9.5 oz) Height: 5' 7" (170.2 cm)       General: Alert, cooperative, no distress, appears stated age  Head: Normocephalic, without obvious abnormality, atraumatic  Neck: no masses, no thyromegaly, no lymphadenopathy  Eyes: PERRL, conjunctiva/corneas clear  Lungs: Respirations unlabored, normal effort, no accessory muscle use  CV: Warm and well perfused extremities  Abdomen: Soft, non-tender, no CVA tenderness, no hepatosplenomegaly, no hernia  Penis: phallus normal, uncircumcised, well cared for, small flat 1 cm discoloration on penis that does not appear obvious for any significant pathology  Scrotum: no cysts, no lesions, no rash, no hydrocele.   Epididymes: normal, nontender, symmetrical, no masses or cysts.   Testes: normal, both descended, no masses.   Urethra: palpably normal with orthotopic meatus of normal size    EN: normal sphincter tone, no masses, no hemmorrhoids   PROSTATE: 15-35g, no nodules, non-tender, symmetrical.   Extremities: Extremities normal, atraumatic, no cyanosis or edema  Skin: Normal color, texture, and turgor, no rashes or lesions  Psych: Appropriate, well oriented, normal affect, normal mood  Neuro: Non-focal      LABS:    No results found for this or any previous visit (from the past 336 hour(s)).      IMAGING:    Reviewed      Assessment/Diagnosis:    1. Erectile dysfunction, unspecified erectile dysfunction type  Testosterone Panel    Estradiol    Prolactin   2. Lower urinary tract symptoms (LUTS)     3. Screening for prostate cancer  PSA, Screening       Plans:    - Extensive discussion with patient regarding the etiology and management of erectile dysfunction. Explained that erectile dysfunction is multi-factorial and can be secondary to neurologic dysfunction, prolactinoma, advanced age, cardiac disease, hypertension,  trauma, DM, renal disease, substance abuse, hypogonadism, Peyronie's " disease, post-surgery, medications, depression and anxiety. We discussed that treatment is contingent on finding the cause of his ED. Treatments include PDE - 5 inhibitors (e.g., Cialis, Viagra), vacuum erection device, tension rings, self-injections, transurethral suppositories and penile prosthesis. Handouts with information also given to patient.   - We discussed the benefit to obtaining a serum AM testosterone, estradiol and prolactin for work-up of his erectile dysfunction.   - Explained that LUTS are multifactorial and can be secondary to an enlarged prostate, PO intake of bladder irritants, overactive bladder, constipation, malignancy, trauma, infection, stones or medications. We discussed that there is a potential benefit to treatment with daily Cialis 5 MG sent to [facility] for LUTS and erectile dysfunction.   - RTC in 3 months. Will move up appointment if labs abnormal.

## 2020-07-23 ENCOUNTER — LAB VISIT (OUTPATIENT)
Dept: LAB | Facility: HOSPITAL | Age: 55
End: 2020-07-23
Attending: UROLOGY
Payer: MEDICARE

## 2020-07-23 ENCOUNTER — TELEPHONE (OUTPATIENT)
Dept: UROLOGY | Facility: CLINIC | Age: 55
End: 2020-07-23

## 2020-07-23 DIAGNOSIS — Z12.5 SCREENING FOR PROSTATE CANCER: ICD-10-CM

## 2020-07-23 DIAGNOSIS — N52.9 ERECTILE DYSFUNCTION, UNSPECIFIED ERECTILE DYSFUNCTION TYPE: ICD-10-CM

## 2020-07-23 LAB
COMPLEXED PSA SERPL-MCNC: 0.41 NG/ML (ref 0–4)
ESTRADIOL SERPL-MCNC: 28 PG/ML (ref 11–44)
PROLACTIN SERPL IA-MCNC: 12.5 NG/ML (ref 3.5–19.4)

## 2020-07-23 PROCEDURE — 84146 ASSAY OF PROLACTIN: CPT

## 2020-07-23 PROCEDURE — 82040 ASSAY OF SERUM ALBUMIN: CPT

## 2020-07-23 PROCEDURE — 82670 ASSAY OF TOTAL ESTRADIOL: CPT

## 2020-07-23 PROCEDURE — 36415 COLL VENOUS BLD VENIPUNCTURE: CPT

## 2020-07-23 PROCEDURE — 84153 ASSAY OF PSA TOTAL: CPT

## 2020-07-23 NOTE — TELEPHONE ENCOUNTER
----- Message from Bailey Palomino Jr., MD sent at 7/23/2020 10:29 AM CDT -----  Please inform patient his PSA is stable and normal. Thanks.

## 2020-07-23 NOTE — TELEPHONE ENCOUNTER
Pt called the office back and let him know that his PSA was normal. Pt verbally understood.        ----- Message from Bailey Palomino Jr., MD sent at 7/23/2020 10:29 AM CDT -----  Please inform patient his PSA is stable and normal. Thanks.

## 2020-07-29 LAB
ALBUMIN SERPL-MCNC: 4.5 G/DL (ref 3.6–5.1)
SHBG SERPL-SCNC: 25 NMOL/L (ref 22–77)
TESTOST FREE SERPL-MCNC: 75.8 PG/ML (ref 46–224)
TESTOST SERPL-MCNC: 456 NG/DL (ref 250–1100)
TESTOSTERONE.FREE+WB SERPL-MCNC: 156 NG/DL (ref 110–575)

## 2020-07-30 ENCOUNTER — TELEPHONE (OUTPATIENT)
Dept: UROLOGY | Facility: CLINIC | Age: 55
End: 2020-07-30

## 2020-07-30 NOTE — TELEPHONE ENCOUNTER
----- Message from Bailey Palomino Jr., MD sent at 7/30/2020  9:27 AM CDT -----  Please inform patient his testosterone is normal at 456 (normal >300). Thanks.

## 2020-10-30 ENCOUNTER — HOSPITAL ENCOUNTER (OUTPATIENT)
Dept: RADIOLOGY | Facility: HOSPITAL | Age: 55
Discharge: HOME | End: 2020-10-30
Attending: INTERNAL MEDICINE
Payer: MEDICARE

## 2020-10-30 DIAGNOSIS — R10.9 ABDOMINAL PAIN: ICD-10-CM

## 2020-10-30 PROCEDURE — 74150 CT ABDOMEN WITHOUT CONTRAST: ICD-10-PCS | Mod: 26,,, | Performed by: RADIOLOGY

## 2020-10-30 PROCEDURE — 74150 CT ABDOMEN W/O CONTRAST: CPT | Mod: TC

## 2020-10-30 PROCEDURE — 74150 CT ABDOMEN W/O CONTRAST: CPT | Mod: 26,,, | Performed by: RADIOLOGY

## 2021-05-31 ENCOUNTER — TELEPHONE (OUTPATIENT)
Dept: PULMONOLOGY | Facility: CLINIC | Age: 56
End: 2021-05-31

## 2021-06-02 ENCOUNTER — OFFICE VISIT (OUTPATIENT)
Dept: PULMONOLOGY | Facility: CLINIC | Age: 56
End: 2021-06-02
Payer: MEDICARE

## 2021-06-02 VITALS
OXYGEN SATURATION: 98 % | BODY MASS INDEX: 28.98 KG/M2 | SYSTOLIC BLOOD PRESSURE: 118 MMHG | DIASTOLIC BLOOD PRESSURE: 78 MMHG | WEIGHT: 185 LBS | HEART RATE: 80 BPM

## 2021-06-02 DIAGNOSIS — Z87.891 HISTORY OF SMOKING: ICD-10-CM

## 2021-06-02 DIAGNOSIS — J44.9 CHRONIC OBSTRUCTIVE PULMONARY DISEASE, UNSPECIFIED COPD TYPE: ICD-10-CM

## 2021-06-02 DIAGNOSIS — K42.9 PERIUMBILICAL HERNIA: ICD-10-CM

## 2021-06-02 DIAGNOSIS — F41.9 ANXIETY: ICD-10-CM

## 2021-06-02 DIAGNOSIS — E65 ABDOMINAL OBESITY: ICD-10-CM

## 2021-06-02 DIAGNOSIS — I10 HYPERTENSION, UNSPECIFIED TYPE: ICD-10-CM

## 2021-06-02 PROCEDURE — 99204 PR OFFICE/OUTPT VISIT, NEW, LEVL IV, 45-59 MIN: ICD-10-PCS | Mod: S$GLB,,, | Performed by: INTERNAL MEDICINE

## 2021-06-02 PROCEDURE — 99204 OFFICE O/P NEW MOD 45 MIN: CPT | Mod: S$GLB,,, | Performed by: INTERNAL MEDICINE

## 2021-06-02 RX ORDER — ALPRAZOLAM 1 MG/1
1 TABLET ORAL 3 TIMES DAILY PRN
Qty: 60 TABLET | Refills: 2 | Status: SHIPPED | OUTPATIENT
Start: 2021-06-02 | End: 2021-06-02 | Stop reason: SDUPTHER

## 2021-06-02 RX ORDER — ALPRAZOLAM 1 MG/1
1 TABLET ORAL 3 TIMES DAILY PRN
Qty: 60 TABLET | Refills: 2 | Status: SHIPPED | OUTPATIENT
Start: 2021-06-02 | End: 2021-06-02 | Stop reason: CLARIF

## 2021-06-02 RX ORDER — ALPRAZOLAM 1 MG/1
1 TABLET ORAL 3 TIMES DAILY PRN
Qty: 90 TABLET | Refills: 3 | Status: SHIPPED | OUTPATIENT
Start: 2021-06-02 | End: 2021-09-27 | Stop reason: SDUPTHER

## 2021-06-02 RX ORDER — BUDESONIDE, GLYCOPYRROLATE, AND FORMOTEROL FUMARATE 160; 9; 4.8 UG/1; UG/1; UG/1
2 AEROSOL, METERED RESPIRATORY (INHALATION) 2 TIMES DAILY
Qty: 10.7 G | Refills: 6 | Status: SHIPPED | OUTPATIENT
Start: 2021-06-02 | End: 2022-08-03 | Stop reason: SDUPTHER

## 2021-06-02 RX ORDER — ATENOLOL 25 MG/1
25 TABLET ORAL DAILY
Qty: 30 TABLET | Refills: 6 | Status: SHIPPED | OUTPATIENT
Start: 2021-06-02 | End: 2021-09-27 | Stop reason: SDUPTHER

## 2021-07-07 ENCOUNTER — OFFICE VISIT (OUTPATIENT)
Dept: PULMONOLOGY | Facility: CLINIC | Age: 56
End: 2021-07-07
Payer: MEDICARE

## 2021-07-07 VITALS
SYSTOLIC BLOOD PRESSURE: 120 MMHG | HEART RATE: 74 BPM | WEIGHT: 186.5 LBS | BODY MASS INDEX: 29.21 KG/M2 | DIASTOLIC BLOOD PRESSURE: 80 MMHG | OXYGEN SATURATION: 97 %

## 2021-07-07 DIAGNOSIS — R06.02 SOB (SHORTNESS OF BREATH): ICD-10-CM

## 2021-07-07 DIAGNOSIS — Z87.891 HISTORY OF SMOKING: ICD-10-CM

## 2021-07-07 DIAGNOSIS — J44.9 CHRONIC OBSTRUCTIVE PULMONARY DISEASE, UNSPECIFIED COPD TYPE: ICD-10-CM

## 2021-07-07 DIAGNOSIS — Z86.16 HISTORY OF COVID-19: ICD-10-CM

## 2021-07-07 DIAGNOSIS — K42.9 PERIUMBILICAL HERNIA: Primary | ICD-10-CM

## 2021-07-07 PROCEDURE — 99214 OFFICE O/P EST MOD 30 MIN: CPT | Mod: S$GLB,,, | Performed by: INTERNAL MEDICINE

## 2021-07-07 PROCEDURE — 99214 PR OFFICE/OUTPT VISIT, EST, LEVL IV, 30-39 MIN: ICD-10-PCS | Mod: S$GLB,,, | Performed by: INTERNAL MEDICINE

## 2021-07-07 RX ORDER — PROMETHAZINE HYDROCHLORIDE AND DEXTROMETHORPHAN HYDROBROMIDE 6.25; 15 MG/5ML; MG/5ML
SYRUP ORAL
COMMUNITY
Start: 2021-01-29 | End: 2022-06-09

## 2021-07-07 RX ORDER — LEVOFLOXACIN 750 MG/1
TABLET ORAL
COMMUNITY
Start: 2021-01-29 | End: 2022-06-09

## 2021-07-07 RX ORDER — BENZONATATE 100 MG/1
CAPSULE ORAL
COMMUNITY
Start: 2021-01-29 | End: 2022-06-09

## 2021-07-07 RX ORDER — PREDNISONE 10 MG/1
TABLET ORAL
COMMUNITY
Start: 2021-01-29 | End: 2022-06-09

## 2021-07-26 ENCOUNTER — TELEPHONE (OUTPATIENT)
Dept: PULMONOLOGY | Facility: CLINIC | Age: 56
End: 2021-07-26

## 2021-08-05 ENCOUNTER — HOSPITAL ENCOUNTER (OUTPATIENT)
Dept: PULMONOLOGY | Facility: HOSPITAL | Age: 56
Discharge: HOME | End: 2021-08-05
Attending: INTERNAL MEDICINE
Payer: MEDICARE

## 2021-08-05 ENCOUNTER — HOSPITAL ENCOUNTER (OUTPATIENT)
Dept: RADIOLOGY | Facility: HOSPITAL | Age: 56
Discharge: HOME | End: 2021-08-05
Attending: INTERNAL MEDICINE
Payer: MEDICARE

## 2021-08-05 VITALS — HEIGHT: 67 IN | BODY MASS INDEX: 29.19 KG/M2 | WEIGHT: 186 LBS

## 2021-08-05 DIAGNOSIS — J44.9 CHRONIC OBSTRUCTIVE PULMONARY DISEASE, UNSPECIFIED COPD TYPE: ICD-10-CM

## 2021-08-05 DIAGNOSIS — K42.9 PERIUMBILICAL HERNIA: ICD-10-CM

## 2021-08-05 PROCEDURE — 94618 PULMONARY STRESS TESTING: CPT

## 2021-08-05 PROCEDURE — 25500020 PHARM REV CODE 255: Performed by: INTERNAL MEDICINE

## 2021-08-05 PROCEDURE — 74160 CT ABDOMEN W/CONTRAST: CPT | Mod: TC

## 2021-08-05 PROCEDURE — 94060 EVALUATION OF WHEEZING: CPT

## 2021-08-05 PROCEDURE — 94727 GAS DIL/WSHOT DETER LNG VOL: CPT

## 2021-08-05 PROCEDURE — 94729 DIFFUSING CAPACITY: CPT

## 2021-08-05 PROCEDURE — 94010 BREATHING CAPACITY TEST: CPT

## 2021-08-05 RX ADMIN — IOHEXOL 100 ML: 350 INJECTION, SOLUTION INTRAVENOUS at 08:08

## 2021-09-27 RX ORDER — ATENOLOL 25 MG/1
25 TABLET ORAL DAILY
Qty: 30 TABLET | Refills: 6 | Status: SHIPPED | OUTPATIENT
Start: 2021-09-27 | End: 2022-03-01

## 2021-09-27 RX ORDER — ALPRAZOLAM 1 MG/1
1 TABLET ORAL 3 TIMES DAILY PRN
Qty: 90 TABLET | Refills: 3 | Status: SHIPPED | OUTPATIENT
Start: 2021-09-27 | End: 2021-09-30

## 2021-10-06 ENCOUNTER — OFFICE VISIT (OUTPATIENT)
Dept: PULMONOLOGY | Facility: CLINIC | Age: 56
End: 2021-10-06
Payer: MEDICARE

## 2021-10-06 VITALS
DIASTOLIC BLOOD PRESSURE: 80 MMHG | BODY MASS INDEX: 29.6 KG/M2 | WEIGHT: 189 LBS | OXYGEN SATURATION: 97 % | HEART RATE: 82 BPM | SYSTOLIC BLOOD PRESSURE: 130 MMHG

## 2021-10-06 DIAGNOSIS — E65 ABDOMINAL OBESITY: ICD-10-CM

## 2021-10-06 DIAGNOSIS — Z86.16 HISTORY OF COVID-19: ICD-10-CM

## 2021-10-06 DIAGNOSIS — Z87.891 HISTORY OF SMOKING: ICD-10-CM

## 2021-10-06 DIAGNOSIS — J44.9 CHRONIC OBSTRUCTIVE PULMONARY DISEASE, UNSPECIFIED COPD TYPE: ICD-10-CM

## 2021-10-06 PROCEDURE — 99214 PR OFFICE/OUTPT VISIT, EST, LEVL IV, 30-39 MIN: ICD-10-PCS | Mod: S$GLB,,, | Performed by: INTERNAL MEDICINE

## 2021-10-06 PROCEDURE — 99214 OFFICE O/P EST MOD 30 MIN: CPT | Mod: S$GLB,,, | Performed by: INTERNAL MEDICINE

## 2022-02-03 ENCOUNTER — OFFICE VISIT (OUTPATIENT)
Dept: PULMONOLOGY | Facility: CLINIC | Age: 57
End: 2022-02-03
Payer: MEDICARE

## 2022-02-03 VITALS
OXYGEN SATURATION: 96 % | HEART RATE: 81 BPM | DIASTOLIC BLOOD PRESSURE: 88 MMHG | BODY MASS INDEX: 29.6 KG/M2 | WEIGHT: 189 LBS | SYSTOLIC BLOOD PRESSURE: 126 MMHG

## 2022-02-03 DIAGNOSIS — Z87.891 HISTORY OF SMOKING: ICD-10-CM

## 2022-02-03 DIAGNOSIS — Z86.16 HISTORY OF COVID-19: ICD-10-CM

## 2022-02-03 DIAGNOSIS — F41.9 ANXIETY: ICD-10-CM

## 2022-02-03 DIAGNOSIS — Z76.82 LUNG TRANSPLANT CANDIDATE: ICD-10-CM

## 2022-02-03 DIAGNOSIS — K42.9 UMBILICAL HERNIA WITHOUT OBSTRUCTION AND WITHOUT GANGRENE: Primary | ICD-10-CM

## 2022-02-03 DIAGNOSIS — J44.9 CHRONIC OBSTRUCTIVE PULMONARY DISEASE, UNSPECIFIED COPD TYPE: ICD-10-CM

## 2022-02-03 PROCEDURE — 99214 OFFICE O/P EST MOD 30 MIN: CPT | Mod: S$GLB,,, | Performed by: INTERNAL MEDICINE

## 2022-02-03 PROCEDURE — 99214 PR OFFICE/OUTPT VISIT, EST, LEVL IV, 30-39 MIN: ICD-10-PCS | Mod: S$GLB,,, | Performed by: INTERNAL MEDICINE

## 2022-02-03 NOTE — PROGRESS NOTES
Office Visit Note *    Patient Name: Erick Graves Jr.  MRN: [id number]  : 1965      Reason for visit: COPD    HPI:     2021 - 55 yo male has been a pt of Dr Ibanez with COPD (stage IV C), + smoking (quit 2019 about 80 pack years) who is here to establish care.  He has been on BREZTRI and prn NATHAN and is doing well.  He does have issues with anxiety (we discussed this).  He does have a small periumbilical hernia.  Has seen Dr Rao for transplant evaluation in 2019.  Last CT scan that I can find is 2019 - nothing to be concerned about.  Waiting on records from Dr Ibanez's office.  Did reviewed some limited records from Dr Ibanez's office.      2021 - Here for follow up, Pt is currently stable on present medications with no recent increases in their symptoms or use of rescue medications.  Since our last visit there have been no hospitalizations or ER visits for their respiratory issues and there does not seem to be anything to suggest unrecognized exacerbations.  I have reviewed the medical regimen and re-educated the pt on the role of rescue and controlling medications.  Inhaler technique and understanding seems adequate.  The patient reports no issues with any of there medications for their COPD.  Refills will be taken care of as needed.  All questions answered.  Has a probable small periumbilical hernia that he wants evaluated.   Reports that he had Covid a few months ago but did not get very sick.    10/6/2021 - Here for follow up, Pt is currently stable on present medications with no recent increases in their symptoms or use of rescue medications.  Since our last visit there have been no hospitalizations or ER visits for their respiratory issues and there does not seem to be anything to suggest unrecognized exacerbations.  I have reviewed the medical regimen and re-educated the pt on the role of rescue and controlling medications.  Inhaler technique and understanding seems adequate.  The  patient reports no issues with any of there medications for their COPD.  Refills will be taken care of as needed.  All questions answered.    2/3/2022 - Here for follow, Pt is currently stable on present medications with no recent increases in their symptoms or use of rescue medications.  Since our last visit there have been no hospitalizations or ER visits for their respiratory issues and there does not seem to be anything to suggest unrecognized exacerbations.  I have reviewed the medical regimen and re-educated the pt on the role of rescue and controlling medications.  Inhaler technique and understanding seems adequate.  The patient reports no issues with any of there medications for their COPD.  Refills will be taken care of as needed.  All questions answered.  Patient has no known corona virus exposures and has been practicing social distancing.  We have discussed the virus and precautions and all questions have been answered.  He did have Covid during the first wave.  Has had 1 Covid vaccine but got very sick with it.            Past Medical History    Past Medical History:   Diagnosis Date    COPD (chronic obstructive pulmonary disease)     Hepatitis     As a child turned jaundice    Hypertension     Peptic ulcer disease        Past Surgical History    History reviewed. No pertinent surgical history.    Medications      Current Outpatient Medications:     ALPRAZolam (XANAX) 1 MG tablet, TAKE 1 TABLET BY MOUTH THREE TIMES A DAY AS NEEDED FOR ANXIETY, Disp: 90 tablet, Rfl: 3    aspirin (ECOTRIN) 81 MG EC tablet, Every day, Disp: , Rfl:     atenoloL (TENORMIN) 25 MG tablet, Take 1 tablet (25 mg total) by mouth once daily., Disp: 30 tablet, Rfl: 6    benzonatate (TESSALON) 100 MG capsule, , Disp: , Rfl:     budesonide-glycopyr-formoterol (BREZTRI AEROSPHERE) 160-9-4.8 mcg/actuation HFAA, Inhale 2 puffs into the lungs 2 (two) times daily., Disp: 10.7 g, Rfl: 6    levoFLOXacin (LEVAQUIN) 750 MG tablet, ,  Disp: , Rfl:     nicotine polacrilex (COMMIT) 2 MG Lozg, Take 2 mg by mouth as needed., Disp: , Rfl:     predniSONE (DELTASONE) 10 MG tablet, , Disp: , Rfl:     promethazine-dextromethorphan (PROMETHAZINE-DM) 6.25-15 mg/5 mL Syrp, , Disp: , Rfl:     VENTOLIN HFA 90 mcg/actuation inhaler, Inhale 1-2 puffs into the lungs every 4 to 6 hours as needed., Disp: , Rfl:     Allergies    Review of patient's allergies indicates:   Allergen Reactions    Codeine      Other reaction(s): Unknown    Crestor [rosuvastatin] Other (See Comments)     Stomach soreness    Lipitor [atorvastatin] Other (See Comments)     Stomach soreness      Penicillins      Other reaction(s): Unknown       SocHx    Social History     Tobacco Use   Smoking Status Former Smoker    Packs/day: 0.50    Years: 34.00    Pack years: 17.00    Types: Cigarettes    Quit date: 2019    Years since quittin.4   Smokeless Tobacco Never Used       Social History     Substance and Sexual Activity   Alcohol Use No    Comment: occasional       Drug Use - no  Occupation -  work x 30 years - retired/disabled  Asbestos exposure - no  Pets - dog    FMHx    History reviewed. No pertinent family history.      Review of Systems  Review of Systems   Constitutional: Positive for malaise/fatigue. Negative for chills, diaphoresis, fever and weight loss.   HENT: Negative for congestion.    Eyes: Negative for pain.   Respiratory: Positive for cough (chronic) and shortness of breath. Negative for hemoptysis, sputum production, wheezing and stridor.    Cardiovascular: Negative for chest pain, palpitations, orthopnea, claudication, leg swelling and PND.   Gastrointestinal: Negative for abdominal pain, blood in stool, constipation, diarrhea, heartburn, melena, nausea and vomiting.   Genitourinary: Negative for dysuria, frequency, hematuria and urgency.   Musculoskeletal: Negative for falls and myalgias.   Neurological: Negative for dizziness, tingling,  tremors, sensory change, speech change, focal weakness, seizures, loss of consciousness, weakness and headaches.   Psychiatric/Behavioral: Negative for depression, substance abuse and suicidal ideas. The patient is nervous/anxious.        Physical Exam    Vitals:    02/03/22 1016   BP: 126/88   BP Location: Left arm   Patient Position: Sitting   BP Method: X-Large (Manual)   Pulse: 81   SpO2: 96%   Weight: 85.7 kg (189 lb)       Physical Exam  Vitals and nursing note reviewed.   Constitutional:       General: He is not in acute distress.     Appearance: Normal appearance. He is well-developed. He is not ill-appearing, toxic-appearing or diaphoretic.   HENT:      Head: Normocephalic and atraumatic.      Right Ear: External ear normal.      Left Ear: External ear normal.      Nose: Nose normal.   Eyes:      General: No scleral icterus.        Right eye: No discharge.         Left eye: No discharge.      Extraocular Movements: Extraocular movements intact.      Conjunctiva/sclera: Conjunctivae normal.      Pupils: Pupils are equal, round, and reactive to light.   Neck:      Thyroid: No thyromegaly.      Vascular: No JVD.      Trachea: No tracheal deviation.   Cardiovascular:      Rate and Rhythm: Normal rate and regular rhythm.      Heart sounds: Normal heart sounds. No murmur heard.  No friction rub. No gallop.    Pulmonary:      Effort: Pulmonary effort is normal. No respiratory distress.      Breath sounds: Normal breath sounds. No stridor. No wheezing, rhonchi or rales.      Comments: Decreased BS throughout  No acc m use  Chest:      Chest wall: No tenderness.   Abdominal:      General: Bowel sounds are normal. There is no distension.      Palpations: Abdomen is soft.      Comments: Small periumbilical hernia   Musculoskeletal:         General: No tenderness. Normal range of motion.      Cervical back: Normal range of motion and neck supple. No rigidity or tenderness.      Right lower leg: No edema.      Left  lower leg: No edema.   Lymphadenopathy:      Cervical: No cervical adenopathy.   Skin:     General: Skin is warm and dry.      Capillary Refill: Capillary refill takes less than 2 seconds.   Neurological:      General: No focal deficit present.      Mental Status: He is alert and oriented to person, place, and time. Mental status is at baseline.      Cranial Nerves: No cranial nerve deficit.      Motor: No weakness.      Gait: Gait normal.      Deep Tendon Reflexes: Reflexes are normal and symmetric.   Psychiatric:         Mood and Affect: Mood normal.         Behavior: Behavior normal.         Thought Content: Thought content normal.         Judgment: Judgment normal.         Labs    Lab Results   Component Value Date    WBC 5.50 03/13/2014    HGB 14.2 03/13/2014    HCT 43.5 03/13/2014     03/13/2014       Sodium   Date Value Ref Range Status   05/29/2015 143 136 - 145 mmol/L Final     Potassium   Date Value Ref Range Status   05/29/2015 5.0 3.5 - 5.1 mmol/L Final     Chloride   Date Value Ref Range Status   05/29/2015 107 95 - 110 mmol/L Final     CO2   Date Value Ref Range Status   05/29/2015 27 23 - 29 mmol/L Final     Glucose   Date Value Ref Range Status   05/29/2015 118 (H) 70 - 110 mg/dL Final     BUN   Date Value Ref Range Status   05/29/2015 14 6 - 20 mg/dL Final     Creatinine   Date Value Ref Range Status   05/29/2015 1.1 0.5 - 1.4 mg/dL Final     Calcium   Date Value Ref Range Status   05/29/2015 9.0 8.7 - 10.5 mg/dL Final     Total Protein   Date Value Ref Range Status   01/27/2015 7.4 6.0 - 8.4 g/dL Final     Albumin   Date Value Ref Range Status   07/23/2020 4.5 3.6 - 5.1 g/dL Final     Comment:     For additional information, please refer to   http://education.PriceMDs.com.Bridgestream/faq/WGY537 (This link is   being provided for informational/ educational purposes only.)  This test was developed and its analytical performance   characteristics have been determined by [facility]. It has not been cleared or approved by the   [place] Food and Drug Administration. This assay has been validated   pursuant to the CLIA regulations and is used for clinical   purposes.  @ Test Performed By:  [facility]  Jonathan Landers M.D., Ph.D.,   [address] [place] [address]  CLIA  [id number]       Total Bilirubin   Date Value Ref Range Status   01/27/2015 0.4 0.1 - 1.0 mg/dL Final     Comment:     For infants and newborns, interpretation of results should be based  on gestational age, weight and in agreement with clinical  observations.  Premature Infant recommended reference ranges:  Up to 24 hours.............<8.0 mg/dL  Up to 48 hours............<12.0 mg/dL  3-5 days..................<15.0 mg/dL  6-29 days.................<15.0 mg/dL       Alkaline Phosphatase   Date Value Ref Range Status   01/27/2015 64 55 - 135 U/L Final     AST   Date Value Ref Range Status   01/27/2015 19 10 - 40 U/L Final     ALT   Date Value Ref Range Status   01/27/2015 19 10 - 44 U/L Final     Anion Gap   Date Value Ref Range Status   05/29/2015 9 8 - 16 mmol/L Final       Xrays        Impression/Plan    Problem List Items Addressed This Visit        Psychiatric    Anxiety     · Will treat prn            Pulmonary    COPD (chronic obstructive pulmonary disease), severe      Continue present medications.   Will refill medications as needed.   Instructed patient to contact us with any issues concerning their medications (cost, reactions, etc.).   Have discussed with patient about inciting conditions which may exacerbate their disease.   We did discuss possible new therapies or de-escalation of therapy (if appropriate).   Asked patient if they were interested in pursuing pulmonary rehabilitation.   All questions answered   RTC 3 months   Patient instructed that they are to call if symptoms change or new issues develop prior to their next  visit.              Other    History of smoking, quit 4/2013, 50 pack-years     · Aware          Lung transplant candidate     · Aware          History of COVID-19     · Resolved           Other Visit Diagnoses     Umbilical hernia without obstruction and without gangrene    -  Primary    Relevant Orders    Ambulatory referral/consult to General Surgery              Asa Morin MD

## 2022-02-03 NOTE — ASSESSMENT & PLAN NOTE
 Continue present medications.   Will refill medications as needed.   Instructed patient to contact us with any issues concerning their medications (cost, reactions, etc.).   Have discussed with patient about inciting conditions which may exacerbate their disease.   We did discuss possible new therapies or de-escalation of therapy (if appropriate).   Asked patient if they were interested in pursuing pulmonary rehabilitation.   All questions answered   RTC 3 months   Patient instructed that they are to call if symptoms change or new issues develop prior to their next visit.

## 2022-05-05 ENCOUNTER — OFFICE VISIT (OUTPATIENT)
Dept: PULMONOLOGY | Facility: CLINIC | Age: 57
End: 2022-05-05
Payer: MEDICARE

## 2022-05-05 VITALS
DIASTOLIC BLOOD PRESSURE: 62 MMHG | HEART RATE: 72 BPM | BODY MASS INDEX: 29.6 KG/M2 | WEIGHT: 189 LBS | OXYGEN SATURATION: 98 % | SYSTOLIC BLOOD PRESSURE: 108 MMHG

## 2022-05-05 DIAGNOSIS — F41.9 ANXIETY: ICD-10-CM

## 2022-05-05 DIAGNOSIS — J44.9 CHRONIC OBSTRUCTIVE PULMONARY DISEASE, UNSPECIFIED COPD TYPE: ICD-10-CM

## 2022-05-05 DIAGNOSIS — Z76.82 LUNG TRANSPLANT CANDIDATE: ICD-10-CM

## 2022-05-05 DIAGNOSIS — Z86.16 HISTORY OF COVID-19: ICD-10-CM

## 2022-05-05 DIAGNOSIS — Z87.891 HISTORY OF SMOKING: ICD-10-CM

## 2022-05-05 PROCEDURE — 99214 PR OFFICE/OUTPT VISIT, EST, LEVL IV, 30-39 MIN: ICD-10-PCS | Mod: S$GLB,,, | Performed by: INTERNAL MEDICINE

## 2022-05-05 PROCEDURE — 99214 OFFICE O/P EST MOD 30 MIN: CPT | Mod: S$GLB,,, | Performed by: INTERNAL MEDICINE

## 2022-05-05 NOTE — PROGRESS NOTES
Office Visit Note *    Patient Name: Erick Graves Jr.  MRN: [id number]  : 1965      Reason for visit: COPD    HPI:     2021 - 57 yo male has been a pt of Dr Ibanez with COPD (stage IV C), + smoking (quit 2019 about 80 pack years) who is here to establish care.  He has been on BREZTRI and prn NATHAN and is doing well.  He does have issues with anxiety (we discussed this).  He does have a small periumbilical hernia.  Has seen Dr Rao for transplant evaluation in 2019.  Last CT scan that I can find is 2019 - nothing to be concerned about.  Waiting on records from Dr Ibanez's office.  Did reviewed some limited records from Dr Ibanez's office.      2021 - Here for follow up, Pt is currently stable on present medications with no recent increases in their symptoms or use of rescue medications.  Since our last visit there have been no hospitalizations or ER visits for their respiratory issues and there does not seem to be anything to suggest unrecognized exacerbations.  I have reviewed the medical regimen and re-educated the pt on the role of rescue and controlling medications.  Inhaler technique and understanding seems adequate.  The patient reports no issues with any of there medications for their COPD.  Refills will be taken care of as needed.  All questions answered.  Has a probable small periumbilical hernia that he wants evaluated.   Reports that he had Covid a few months ago but did not get very sick.    10/6/2021 - Here for follow up, Pt is currently stable on present medications with no recent increases in their symptoms or use of rescue medications.  Since our last visit there have been no hospitalizations or ER visits for their respiratory issues and there does not seem to be anything to suggest unrecognized exacerbations.  I have reviewed the medical regimen and re-educated the pt on the role of rescue and controlling medications.  Inhaler technique and understanding seems adequate.  The  patient reports no issues with any of there medications for their COPD.  Refills will be taken care of as needed.  All questions answered.    2/3/2022 - Here for follow, Pt is currently stable on present medications with no recent increases in their symptoms or use of rescue medications.  Since our last visit there have been no hospitalizations or ER visits for their respiratory issues and there does not seem to be anything to suggest unrecognized exacerbations.  I have reviewed the medical regimen and re-educated the pt on the role of rescue and controlling medications.  Inhaler technique and understanding seems adequate.  The patient reports no issues with any of there medications for their COPD.  Refills will be taken care of as needed.  All questions answered.  Patient has no known corona virus exposures and has been practicing social distancing.  We have discussed the virus and precautions and all questions have been answered.  He did have Covid during the first wave.  Has had 1 Covid vaccine but got very sick with it.    5/5/2022 - Here for follow up, Pt is currently stable on present medications with no recent increases in their symptoms or use of rescue medications.  Since our last visit there have been no hospitalizations or ER visits for their respiratory issues and there does not seem to be anything to suggest unrecognized exacerbations.  I have reviewed the medical regimen and re-educated the pt on the role of rescue and controlling medications.  Inhaler technique and understanding seems adequate.  The patient reports no issues with any of there medications for their COPD.  Refills will be taken care of as needed.  All questions answered.  Has had some increase in SOB, CARUSO and congestion but has not been taking BREZTRI regularly due to donut hole issues (we gave samples).  Patient has no known corona virus exposures and has been practicing social distancing.  We have discussed the virus and precautions  and all questions have been answered.  When he was smoking he smoked 1-1.5 PPD for about 35 years.    Low Dose Screening CT Chest    Cigarettes - 1-1.5 PPD x 35 YEARS  Still smoking -  NO    Date of last LD CT - 2019    Result - emphysema, o/w no concerning findings    I have discussed with pt about using a screening CT of the chest due to history of cigarette smoking.  We have discussed the possible findings and possible actions as a result of these findings.  The pt would like to proceed.    Past Medical History    Past Medical History:   Diagnosis Date    COPD (chronic obstructive pulmonary disease)     Hepatitis     As a child turned jaundice    Hypertension     Peptic ulcer disease        Past Surgical History     History reviewed. No pertinent surgical history.    Medications      Current Outpatient Medications:     ALPRAZolam (XANAX) 1 MG tablet, TAKE ONE TABLET BY MOUTH THREE TIMES DAILY AS NEEDED FOR ANXIETY., Disp: 90 tablet, Rfl: 1    aspirin (ECOTRIN) 81 MG EC tablet, Every day, Disp: , Rfl:     atenoloL (TENORMIN) 25 MG tablet, TAKE ONE TABLET BY MOUTH ONCE DAILY, Disp: 30 tablet, Rfl: 6    benzonatate (TESSALON) 100 MG capsule, , Disp: , Rfl:     budesonide-glycopyr-formoterol (BREZTRI AEROSPHERE) 160-9-4.8 mcg/actuation HFAA, Inhale 2 puffs into the lungs 2 (two) times daily., Disp: 10.7 g, Rfl: 6    levoFLOXacin (LEVAQUIN) 750 MG tablet, , Disp: , Rfl:     nicotine polacrilex (COMMIT) 2 MG Lozg, Take 2 mg by mouth as needed., Disp: , Rfl:     predniSONE (DELTASONE) 10 MG tablet, , Disp: , Rfl:     promethazine-dextromethorphan (PROMETHAZINE-DM) 6.25-15 mg/5 mL Syrp, , Disp: , Rfl:     VENTOLIN HFA 90 mcg/actuation inhaler, Inhale 1-2 puffs into the lungs every 4 to 6 hours as needed., Disp: , Rfl:     Allergies    Review of patient's allergies indicates:   Allergen Reactions    Codeine      Other reaction(s): Unknown    Crestor [rosuvastatin] Other (See Comments)     Stomach soreness     Lipitor [atorvastatin] Other (See Comments)     Stomach soreness      Penicillins      Other reaction(s): Unknown       SocHx    Social History     Tobacco Use   Smoking Status Former Smoker    Packs/day: 0.50    Years: 34.00    Pack years: 17.00    Types: Cigarettes    Quit date: 2019    Years since quittin.7   Smokeless Tobacco Never Used       Social History     Substance and Sexual Activity   Alcohol Use No    Comment: occasional       Drug Use - no  Occupation -  work x 30 years - retired/disabled  Asbestos exposure - no  Pets - dog    FMHx    History reviewed. No pertinent family history.      Review of Systems  Review of Systems   Constitutional: Positive for malaise/fatigue. Negative for chills, diaphoresis, fever and weight loss.   HENT: Negative for congestion.    Eyes: Negative for pain.   Respiratory: Positive for cough (chronic) and shortness of breath. Negative for hemoptysis, sputum production, wheezing and stridor.    Cardiovascular: Negative for chest pain, palpitations, orthopnea, claudication, leg swelling and PND.   Gastrointestinal: Negative for abdominal pain, blood in stool, constipation, diarrhea, heartburn, melena, nausea and vomiting.   Genitourinary: Negative for dysuria, frequency, hematuria and urgency.   Musculoskeletal: Negative for falls and myalgias.   Neurological: Negative for dizziness, tingling, tremors, sensory change, speech change, focal weakness, seizures, loss of consciousness, weakness and headaches.   Psychiatric/Behavioral: Negative for depression, substance abuse and suicidal ideas. The patient is nervous/anxious.        Physical Exam    Vitals:    22 1039   BP: 108/62   BP Location: Left arm   Patient Position: Sitting   BP Method: X-Large (Manual)   Pulse: 72   SpO2: 98%   Weight: 85.7 kg (189 lb)       Physical Exam  Vitals and nursing note reviewed.   Constitutional:       General: He is not in acute distress.     Appearance:  Normal appearance. He is well-developed. He is not ill-appearing, toxic-appearing or diaphoretic.   HENT:      Head: Normocephalic and atraumatic.      Right Ear: External ear normal.      Left Ear: External ear normal.      Nose: Nose normal.   Eyes:      General: No scleral icterus.        Right eye: No discharge.         Left eye: No discharge.      Extraocular Movements: Extraocular movements intact.      Conjunctiva/sclera: Conjunctivae normal.      Pupils: Pupils are equal, round, and reactive to light.   Neck:      Thyroid: No thyromegaly.      Vascular: No JVD.      Trachea: No tracheal deviation.   Cardiovascular:      Rate and Rhythm: Normal rate and regular rhythm.      Heart sounds: Normal heart sounds. No murmur heard.    No friction rub. No gallop.   Pulmonary:      Effort: Pulmonary effort is normal. No respiratory distress.      Breath sounds: Normal breath sounds. No stridor. No wheezing, rhonchi or rales.      Comments: Decreased BS throughout  No acc m use  Chest:      Chest wall: No tenderness.   Abdominal:      General: Bowel sounds are normal. There is no distension.      Palpations: Abdomen is soft.      Comments: Small periumbilical hernia   Musculoskeletal:         General: No tenderness. Normal range of motion.      Cervical back: Normal range of motion and neck supple. No rigidity or tenderness.      Right lower leg: No edema.      Left lower leg: No edema.   Lymphadenopathy:      Cervical: No cervical adenopathy.   Skin:     General: Skin is warm and dry.      Capillary Refill: Capillary refill takes less than 2 seconds.   Neurological:      General: No focal deficit present.      Mental Status: He is alert and oriented to person, place, and time. Mental status is at baseline.      Cranial Nerves: No cranial nerve deficit.      Motor: No weakness.      Gait: Gait normal.      Deep Tendon Reflexes: Reflexes are normal and symmetric.   Psychiatric:         Mood and Affect: Mood normal.          Behavior: Behavior normal.         Thought Content: Thought content normal.         Judgment: Judgment normal.         Labs    Lab Results   Component Value Date    WBC 5.50 03/13/2014    HGB 14.2 03/13/2014    HCT 43.5 03/13/2014     03/13/2014       Sodium   Date Value Ref Range Status   05/29/2015 143 136 - 145 mmol/L Final     Potassium   Date Value Ref Range Status   05/29/2015 5.0 3.5 - 5.1 mmol/L Final     Chloride   Date Value Ref Range Status   05/29/2015 107 95 - 110 mmol/L Final     CO2   Date Value Ref Range Status   05/29/2015 27 23 - 29 mmol/L Final     Glucose   Date Value Ref Range Status   05/29/2015 118 (H) 70 - 110 mg/dL Final     BUN   Date Value Ref Range Status   05/29/2015 14 6 - 20 mg/dL Final     Creatinine   Date Value Ref Range Status   05/29/2015 1.1 0.5 - 1.4 mg/dL Final     Calcium   Date Value Ref Range Status   05/29/2015 9.0 8.7 - 10.5 mg/dL Final     Total Protein   Date Value Ref Range Status   01/27/2015 7.4 6.0 - 8.4 g/dL Final     Albumin   Date Value Ref Range Status   07/23/2020 4.5 3.6 - 5.1 g/dL Final     Comment:     For additional information, please refer to   http://education.[employer]/faq/YFE151 (This link is   being provided for informational/ educational purposes only.)  This test was developed and its analytical performance   characteristics have been determined by [facility]. It has not been cleared or approved by the   [place] Food and Drug Administration. This assay has been validated   pursuant to the CLIA regulations and is used for clinical   purposes.  @ Test Performed By:  [facility]  Jonathan Landers M.D., Ph.D.,   [address] [place] [id number]  [place]  [id number]       Total Bilirubin   Date Value Ref Range Status   01/27/2015 0.4 0.1 - 1.0 mg/dL Final     Comment:     For infants and newborns, interpretation of results should be based  on  gestational age, weight and in agreement with clinical  observations.  Premature Infant recommended reference ranges:  Up to 24 hours.............<8.0 mg/dL  Up to 48 hours............<12.0 mg/dL  3-5 days..................<15.0 mg/dL  6-29 days.................<15.0 mg/dL       Alkaline Phosphatase   Date Value Ref Range Status   01/27/2015 64 55 - 135 U/L Final     AST   Date Value Ref Range Status   01/27/2015 19 10 - 40 U/L Final     ALT   Date Value Ref Range Status   01/27/2015 19 10 - 44 U/L Final     Anion Gap   Date Value Ref Range Status   05/29/2015 9 8 - 16 mmol/L Final       Xrays        Impression/Plan    Problem List Items Addressed This Visit        Psychiatric    Anxiety     · Will treat prn              Pulmonary    COPD (chronic obstructive pulmonary disease), severe      Continue present medications.   Will refill medications as needed.   Instructed patient to contact us with any issues concerning their medications (cost, reactions, etc.).   Have discussed with patient about inciting conditions which may exacerbate their disease.   We did discuss possible new therapies or de-escalation of therapy (if appropriate).   Asked patient if they were interested in pursuing pulmonary rehabilitation.   All questions answered   RTC 3 months   Patient instructed that they are to call if symptoms change or new issues develop prior to their next visit.   Sample of BREZTRI and explained that he has to take regularly             Lung transplant candidate     · Aware               ID    History of COVID-19     · Aware, resolved              Other    History of smoking, quit 4/2013, 50 pack-years     · Will order LDSCT chest           Relevant Orders    CT Chest Lung Screening Low Dose        Have told to go see dermatology regarding moles      Asa Morin MD

## 2022-05-05 NOTE — ASSESSMENT & PLAN NOTE
 Continue present medications.   Will refill medications as needed.   Instructed patient to contact us with any issues concerning their medications (cost, reactions, etc.).   Have discussed with patient about inciting conditions which may exacerbate their disease.   We did discuss possible new therapies or de-escalation of therapy (if appropriate).   Asked patient if they were interested in pursuing pulmonary rehabilitation.   All questions answered   RTC 3 months   Patient instructed that they are to call if symptoms change or new issues develop prior to their next visit.   Sample of BREZTRI and explained that he has to take regularly

## 2022-05-16 ENCOUNTER — TELEPHONE (OUTPATIENT)
Dept: PULMONOLOGY | Facility: CLINIC | Age: 57
End: 2022-05-16

## 2022-05-16 ENCOUNTER — HOSPITAL ENCOUNTER (OUTPATIENT)
Dept: RADIOLOGY | Facility: HOSPITAL | Age: 57
Discharge: HOME | End: 2022-05-16
Attending: INTERNAL MEDICINE
Payer: MEDICARE

## 2022-05-16 DIAGNOSIS — Z87.891 HISTORY OF SMOKING: ICD-10-CM

## 2022-05-16 PROCEDURE — 71271 CT THORAX LUNG CANCER SCR C-: CPT | Mod: TC,PO

## 2022-05-16 NOTE — TELEPHONE ENCOUNTER
----- Message from Asa Morin MD sent at 5/16/2022  1:20 PM CDT -----  Screening CT chest looks good - nothing to be concerned about

## 2022-06-03 ENCOUNTER — HOSPITAL ENCOUNTER (EMERGENCY)
Facility: HOSPITAL | Age: 57
Discharge: HOME | End: 2022-06-03
Attending: EMERGENCY MEDICINE | Admitting: EMERGENCY MEDICINE
Payer: MEDICARE

## 2022-06-03 DIAGNOSIS — R10.9 ABDOMINAL PAIN: ICD-10-CM

## 2022-06-03 DIAGNOSIS — K42.9 UMBILICAL HERNIA WITHOUT OBSTRUCTION AND WITHOUT GANGRENE: Primary | ICD-10-CM

## 2022-06-03 LAB
ALBUMIN SERPL BCP-MCNC: 4.8 G/DL (ref 3.5–5.2)
ALP SERPL-CCNC: 58 U/L (ref 55–135)
ALT SERPL W/O P-5'-P-CCNC: 21 U/L (ref 10–44)
ANION GAP SERPL CALC-SCNC: 11 MMOL/L (ref 8–16)
AST SERPL-CCNC: 17 U/L (ref 10–40)
BASOPHILS # BLD AUTO: 0.04 K/UL (ref 0–0.2)
BASOPHILS NFR BLD: 0.3 % (ref 0–1.9)
BILIRUB SERPL-MCNC: 0.9 MG/DL (ref 0.1–1)
BILIRUB UR QL STRIP: NEGATIVE
BUN SERPL-MCNC: 24 MG/DL (ref 6–20)
CALCIUM SERPL-MCNC: 9.1 MG/DL (ref 8.7–10.5)
CHLORIDE SERPL-SCNC: 104 MMOL/L (ref 95–110)
CLARITY UR: CLEAR
CO2 SERPL-SCNC: 25 MMOL/L (ref 23–29)
COLOR UR: YELLOW
CREAT SERPL-MCNC: 1.2 MG/DL (ref 0.5–1.4)
DIFFERENTIAL METHOD: ABNORMAL
EOSINOPHIL # BLD AUTO: 0.1 K/UL (ref 0–0.5)
EOSINOPHIL NFR BLD: 0.6 % (ref 0–8)
ERYTHROCYTE [DISTWIDTH] IN BLOOD BY AUTOMATED COUNT: 12.8 % (ref 11.5–14.5)
EST. GFR  (AFRICAN AMERICAN): >60 ML/MIN/1.73 M^2
EST. GFR  (NON AFRICAN AMERICAN): >60 ML/MIN/1.73 M^2
GLUCOSE SERPL-MCNC: 138 MG/DL (ref 70–110)
GLUCOSE UR QL STRIP: NEGATIVE
HCT VFR BLD AUTO: 50 % (ref 40–54)
HGB BLD-MCNC: 16.6 G/DL (ref 14–18)
HGB UR QL STRIP: NEGATIVE
IMM GRANULOCYTES # BLD AUTO: 0.06 K/UL (ref 0–0.04)
IMM GRANULOCYTES NFR BLD AUTO: 0.5 % (ref 0–0.5)
KETONES UR QL STRIP: ABNORMAL
LEUKOCYTE ESTERASE UR QL STRIP: NEGATIVE
LIPASE SERPL-CCNC: 51 U/L (ref 4–60)
LYMPHOCYTES # BLD AUTO: 0.3 K/UL (ref 1–4.8)
LYMPHOCYTES NFR BLD: 2.5 % (ref 18–48)
MCH RBC QN AUTO: 29.2 PG (ref 27–31)
MCHC RBC AUTO-ENTMCNC: 33.2 G/DL (ref 32–36)
MCV RBC AUTO: 88 FL (ref 82–98)
MONOCYTES # BLD AUTO: 1 K/UL (ref 0.3–1)
MONOCYTES NFR BLD: 7.4 % (ref 4–15)
NEUTROPHILS # BLD AUTO: 11.3 K/UL (ref 1.8–7.7)
NEUTROPHILS NFR BLD: 88.7 % (ref 38–73)
NITRITE UR QL STRIP: NEGATIVE
NRBC BLD-RTO: 0 /100 WBC
PH UR STRIP: 6 [PH] (ref 5–8)
PLATELET # BLD AUTO: 190 K/UL (ref 150–450)
PMV BLD AUTO: 11.2 FL (ref 9.2–12.9)
POTASSIUM SERPL-SCNC: 5.3 MMOL/L (ref 3.5–5.1)
PROT SERPL-MCNC: 7.8 G/DL (ref 6–8.4)
PROT UR QL STRIP: ABNORMAL
RBC # BLD AUTO: 5.69 M/UL (ref 4.6–6.2)
SODIUM SERPL-SCNC: 140 MMOL/L (ref 136–145)
SP GR UR STRIP: >1.03 (ref 1–1.03)
URN SPEC COLLECT METH UR: ABNORMAL
UROBILINOGEN UR STRIP-ACNC: NEGATIVE EU/DL
WBC # BLD AUTO: 12.76 K/UL (ref 3.9–12.7)

## 2022-06-03 PROCEDURE — 93010 EKG 12-LEAD: ICD-10-PCS | Mod: ,,, | Performed by: INTERNAL MEDICINE

## 2022-06-03 PROCEDURE — 96375 TX/PRO/DX INJ NEW DRUG ADDON: CPT

## 2022-06-03 PROCEDURE — 83690 ASSAY OF LIPASE: CPT | Performed by: EMERGENCY MEDICINE

## 2022-06-03 PROCEDURE — 81003 URINALYSIS AUTO W/O SCOPE: CPT | Performed by: EMERGENCY MEDICINE

## 2022-06-03 PROCEDURE — 93005 ELECTROCARDIOGRAM TRACING: CPT | Performed by: INTERNAL MEDICINE

## 2022-06-03 PROCEDURE — 99285 EMERGENCY DEPT VISIT HI MDM: CPT | Mod: 25

## 2022-06-03 PROCEDURE — 93010 ELECTROCARDIOGRAM REPORT: CPT | Mod: ,,, | Performed by: INTERNAL MEDICINE

## 2022-06-03 PROCEDURE — 96374 THER/PROPH/DIAG INJ IV PUSH: CPT

## 2022-06-03 PROCEDURE — 85025 COMPLETE CBC W/AUTO DIFF WBC: CPT | Performed by: EMERGENCY MEDICINE

## 2022-06-03 PROCEDURE — 63600175 PHARM REV CODE 636 W HCPCS: Performed by: EMERGENCY MEDICINE

## 2022-06-03 PROCEDURE — 80053 COMPREHEN METABOLIC PANEL: CPT | Performed by: EMERGENCY MEDICINE

## 2022-06-03 RX ORDER — ONDANSETRON 2 MG/ML
8 INJECTION INTRAMUSCULAR; INTRAVENOUS
Status: COMPLETED | OUTPATIENT
Start: 2022-06-03 | End: 2022-06-03

## 2022-06-03 RX ORDER — MORPHINE SULFATE 4 MG/ML
4 INJECTION, SOLUTION INTRAMUSCULAR; INTRAVENOUS
Status: COMPLETED | OUTPATIENT
Start: 2022-06-03 | End: 2022-06-03

## 2022-06-03 RX ADMIN — MORPHINE SULFATE 4 MG: 4 INJECTION, SOLUTION INTRAMUSCULAR; INTRAVENOUS at 10:06

## 2022-06-03 RX ADMIN — ONDANSETRON 8 MG: 2 INJECTION INTRAMUSCULAR; INTRAVENOUS at 10:06

## 2022-06-04 VITALS
SYSTOLIC BLOOD PRESSURE: 147 MMHG | TEMPERATURE: 98 F | RESPIRATION RATE: 18 BRPM | WEIGHT: 182 LBS | OXYGEN SATURATION: 96 % | DIASTOLIC BLOOD PRESSURE: 74 MMHG | BODY MASS INDEX: 28.51 KG/M2 | HEART RATE: 94 BPM

## 2022-06-04 NOTE — ED PROVIDER NOTES
Encounter Date: 6/3/2022       History     Chief Complaint   Patient presents with    Abdominal Pain    Vomiting     Pt reports hx of hernia, pain today after helping someone up at work. Also reports vomiting that began today     57-year-old male with past medical history of COPD, hypertension, PUD presents secondary to abdominal pain.  Patient states he has a hernia that seems to have popped out he cannot push it back in.  He states he was helping to  someone at work on today with the event occurred.  He reports nausea vomiting since the incident as well.  Denies any fevers, chills, sweats, chest pain or shortness of breath.  Patient is otherwise stable and has no other complaints.        Review of patient's allergies indicates:   Allergen Reactions    Codeine      Other reaction(s): Unknown    Crestor [rosuvastatin] Other (See Comments)     Stomach soreness    Lipitor [atorvastatin] Other (See Comments)     Stomach soreness      Penicillins      Other reaction(s): Unknown     Past Medical History:   Diagnosis Date    COPD (chronic obstructive pulmonary disease)     Hepatitis     As a child turned jaundice    Hypertension     Peptic ulcer disease      No past surgical history on file.  No family history on file.  Social History     Tobacco Use    Smoking status: Former Smoker     Packs/day: 0.50     Years: 34.00     Pack years: 17.00     Types: Cigarettes     Quit date: 2019     Years since quittin.8    Smokeless tobacco: Never Used   Substance Use Topics    Alcohol use: No     Comment: occasional    Drug use: No     Comment: 18 - cocaine and crack use - quit 17-18 years ago     Review of Systems   Gastrointestinal: Positive for abdominal pain, nausea and vomiting.   All other systems reviewed and are negative.      Physical Exam     Initial Vitals [22 1904]   BP Pulse Resp Temp SpO2   (!) 147/85 100 18 98.6 °F (37 °C) 99 %      MAP       --         Physical Exam    Nursing  note and vitals reviewed.  Constitutional: He appears well-developed and well-nourished. He is not diaphoretic. He appears distressed.   HENT:   Head: Normocephalic and atraumatic.   Mouth/Throat: Oropharynx is clear and moist.   Eyes: Conjunctivae and EOM are normal. Pupils are equal, round, and reactive to light.   Neck: Neck supple. No thyromegaly present. No JVD present.   Normal range of motion.  Cardiovascular: Normal rate, regular rhythm and normal heart sounds. Exam reveals no gallop and no friction rub.    No murmur heard.  Pulmonary/Chest: Breath sounds normal. No respiratory distress. He has no wheezes. He exhibits no tenderness.   Abdominal: Abdomen is soft. Bowel sounds are normal. He exhibits distension. There is abdominal tenderness. There is no rebound and no guarding.   Musculoskeletal:         General: No tenderness or edema. Normal range of motion.      Cervical back: Normal range of motion and neck supple.     Neurological: He is alert and oriented to person, place, and time. He has normal strength. No cranial nerve deficit or sensory deficit.   Skin: Skin is warm and dry. Capillary refill takes less than 2 seconds. No rash noted. No erythema.   Psychiatric: He has a normal mood and affect.         ED Course   Procedures  Labs Reviewed   CBC W/ AUTO DIFFERENTIAL - Abnormal; Notable for the following components:       Result Value    WBC 12.76 (*)     Gran # (ANC) 11.3 (*)     Immature Grans (Abs) 0.06 (*)     Lymph # 0.3 (*)     Gran % 88.7 (*)     Lymph % 2.5 (*)     All other components within normal limits    Narrative:     For upper or mid abdominal pain.   COMPREHENSIVE METABOLIC PANEL - Abnormal; Notable for the following components:    Potassium 5.3 (*)     Glucose 138 (*)     BUN 24 (*)     All other components within normal limits    Narrative:     For upper or mid abdominal pain.   URINALYSIS, REFLEX TO URINE CULTURE - Abnormal; Notable for the following components:    Specific Gravity,  UA >1.030 (*)     Protein, UA Trace (*)     Ketones, UA Trace (*)     All other components within normal limits    Narrative:     In and Out Cath as needed it patient unable to void  Specimen Source->Urine   LIPASE    Narrative:     For upper or mid abdominal pain.          Imaging Results          CT Abdomen Pelvis  Without Contrast (Final result)  Result time 06/03/22 22:14:44    Final result by Anthony Contreras MD (06/03/22 22:14:44)                 Narrative:    PROCEDURE: CT ABDOMEN PELVIS WITHOUT IV CONTRAST, 6/3/2022 9:42 PM CDT    CLINICAL INDICATION:  Abdominal pain, acute, nonlocalized.  Umbilical pain.    COMPARISON: 8/5/2021    TECHNIQUE: Helical CT acquisition performed of the abdomen and pelvis with coronal and sagittal reformats. PO Contrast: None.  Complications: None.    CT Dose reduction techniques were utilized for this examination with 1 or more of the following: Automated exposure control and/or adjustment of the mA or kV according to patient size, and/or use of iterative reconstruction technique.    FINDINGS:    Evaluation of vessels and solid organs limited without IV contrast.    Visualized chest base: No pleural effusions, lung consolidations, or appreciable significant cardiac effusion.    Free air: No pneumoperitoneum.    Hepatobiliary system: No discrete lesions. No intra- or extrahepatic biliary ductal dilatation.    Gallbladder: Partially distended gallbladder.    Spleen: Normal size.    Pancreas: No focal masses. No pancreatic ductal dilatation. No significant peripancreatic inflammatory changes.    Adrenal glands: Normal.    Kidneys and collecting system: Mild nonspecific left perinephric strandy changes. No hydronephrosis or nephrolithiasis.    Bowel: No evidence of small bowel obstruction. Portions of colon are collapsed, degrading evaluation of these regions and likely accentuating the colonic wall in these regions.    Appendix: Normal caliber. No significant inflammatory  changes.    Soft tissues: Small fat-containing umbilical hernia with a 1.7 cm neck. Additional tiny 7 mm supraumbilical hernia just to right of midline (axial series 3, image 96).    Pelvic organs: Partially collapsed urinary bladder. Increased density within the urinary bladder could reflect evidence of hematuria or proteinaceous debris.    Bones: No evidence for fracture.      IMPRESSION:    Small fat-containing umbilical hernia with a 1.7 cm neck. Additional tiny 7 mm supraumbilical hernia just to right of midline.    Increased density within the urinary bladder could reflect evidence of hematuria or proteinaceous debris or other material. Correlate with urinalysis.    Mild nonspecific left perinephric strandy changes.          Electronically signed by:  Anthony Contreras MD  6/3/2022 10:14 PM CDT Workstation: [id number]                               Medications   morphine injection 4 mg (4 mg Intravenous Given 6/3/22 2208)   ondansetron injection 8 mg (8 mg Intravenous Given 6/3/22 2209)     Medical Decision Making:   Initial Assessment:   57-year-old male initial assessment in mild distress secondary to umbilical hernia.  Patient is alert oriented x3, neurologically and neurovascular intact no focal deficits.  He is nontoxic-appearing and vitals stable at this time.  Differential Diagnosis:   Umbilical hernia, incarcerated hernia, UTI  Clinical Tests:   Lab Tests: Ordered and Reviewed  The following lab test(s) were unremarkable: CBC, CMP, Urinalysis and Lipase  Radiological Study: Ordered and Reviewed  ED Management:  Patient has been reassessed noted to have no acute changes in his condition.  Labs images unremarkable for any acute pathology requiring further hospital admission or treatment this time.  Umbilical hernia is reduced at bedside without any difficulties.  Patient has remained stable while in the ED and discharged home stable condition with PCP and general surgery follow-up.  Mr. Graves and wife are  aware of the plan and in agreement with discharge.    Pt's plan and diagnosis was discussed. All questions were answered and patient was comfortable with the plan. This patient was personally seen and personally examined by me and I personally performed the services described in this documentation.   Complexity of the visit is established by the note or I have spent at least the amount of time discussing findings, exam and/or radiographs or imaging studies.     MD uses EPIC and voice recognition software prone to occasional and minor errors that may persist in the medical record.                        Clinical Impression:   Final diagnoses:  [R10.9] Abdominal pain  [K42.9] Umbilical hernia without obstruction and without gangrene (Primary)          ED Disposition Condition    Discharge Stable        ED Prescriptions     None        Follow-up Information     Follow up With Specialties Details Why Contact Info Additional Information    [facility] - Emergency Dept Emergency Medicine  As needed, If symptoms worsen [address] [facility] [address]  [phone] 1st floor    Phillip Chao III, MD General Surgery, Surgery Schedule an appointment as soon as possible for a visit in 2 days umbilical hernia [address] [facility]  [address]  [facility] [address]  [phone]              Dayton Lamas MD  06/03/22 [id number]

## 2022-06-06 ENCOUNTER — TELEPHONE (OUTPATIENT)
Dept: SURGERY | Facility: CLINIC | Age: 57
End: 2022-06-06
Payer: MEDICARE

## 2022-06-06 NOTE — TELEPHONE ENCOUNTER
----- Message from Maria Del Rosario Burch sent at 6/6/2022  7:57 AM CDT -----  Contact: pt  Type: Return Call    Who Called: Nurse     Who Left Message: Maryellen     Does the patient know what this is regarding: Yes    Best Call Back Number: [phone]    Thank you~

## 2022-06-06 NOTE — TELEPHONE ENCOUNTER
Spoke with patient per request appointment scheduled with Dr Chao 9:15 am 6/9. Confirmed date time and location with patient

## 2022-06-06 NOTE — TELEPHONE ENCOUNTER
----- Message from Connie Morocho sent at 6/6/2022  8:44 AM CDT -----  Contact: [phone] (Mariam)  Type:  Sooner Appointment Request    Caller is requesting a sooner appointment.  Caller declined first available appointment listed below.  Caller will not accept being placed on the waitlist and is requesting a message be sent to doctor.    Name of Caller:  Pt  When is the first available appointment?  6/21  Symptoms:  Umbelical hernia Er fu  Best Call Back Number: [phone] (Mariam)     Additional Information:  Pls call back and advise pt would like Weds if possible

## 2022-06-09 ENCOUNTER — OFFICE VISIT (OUTPATIENT)
Dept: SURGERY | Facility: CLINIC | Age: 57
End: 2022-06-09
Payer: MEDICARE

## 2022-06-09 VITALS — DIASTOLIC BLOOD PRESSURE: 86 MMHG | SYSTOLIC BLOOD PRESSURE: 134 MMHG | TEMPERATURE: 98 F | HEART RATE: 71 BPM

## 2022-06-09 DIAGNOSIS — K43.6 VENTRAL HERNIA WITH OBSTRUCTION AND WITHOUT GANGRENE: Primary | ICD-10-CM

## 2022-06-09 PROCEDURE — 99204 OFFICE O/P NEW MOD 45 MIN: CPT | Mod: S$GLB,,, | Performed by: SURGERY

## 2022-06-09 PROCEDURE — 99204 PR OFFICE/OUTPT VISIT, NEW, LEVL IV, 45-59 MIN: ICD-10-PCS | Mod: S$GLB,,, | Performed by: SURGERY

## 2022-06-09 NOTE — H&P (VIEW-ONLY)
Subjective:       Patient ID: Erick Graves Jr. is a 57 y.o. male.    Chief Complaint: Hernia      HPI:  57 year old male referred to the office with a symptomatic ventral hernia. He noticed the hernia several years ago. It has been more uncomfortable over the past few weeks. He went to the ED last week due to acute onset of pain and nonreducible lump. Hernia found to be incarcerated with fat. It was reduced with resolutions of symtpoms. CT showed 2 cm hernia a couple of cm's superior to the unbilicus. He has no history of prior repair or periumbilical surgery.     Past Medical History:   Diagnosis Date    COPD (chronic obstructive pulmonary disease)     Hepatitis     As a child turned jaundice    Hypertension     Peptic ulcer disease      No past surgical history on file.  Review of patient's allergies indicates:   Allergen Reactions    Codeine      Other reaction(s): Unknown    Crestor [rosuvastatin] Other (See Comments)     Stomach soreness    Lipitor [atorvastatin] Other (See Comments)     Stomach soreness      Penicillins      Other reaction(s): Unknown     Medication List with Changes/Refills   Current Medications    ALPRAZOLAM (XANAX) 1 MG TABLET    TAKE ONE TABLET BY MOUTH THREE TIMES DAILY AS NEEDED FOR ANXIETY.    ASPIRIN (ECOTRIN) 81 MG EC TABLET    Every day    ATENOLOL (TENORMIN) 25 MG TABLET    TAKE ONE TABLET BY MOUTH ONCE DAILY    BUDESONIDE-GLYCOPYR-FORMOTEROL (BREZTRI AEROSPHERE) 160-9-4.8 MCG/ACTUATION HFAA    Inhale 2 puffs into the lungs 2 (two) times daily.    VENTOLIN HFA 90 MCG/ACTUATION INHALER    Inhale 1-2 puffs into the lungs every 4 to 6 hours as needed.   Discontinued Medications    BENZONATATE (TESSALON) 100 MG CAPSULE        LEVOFLOXACIN (LEVAQUIN) 750 MG TABLET        NICOTINE POLACRILEX (COMMIT) 2 MG LOZG    Take 2 mg by mouth as needed.    PREDNISONE (DELTASONE) 10 MG TABLET        PROMETHAZINE-DEXTROMETHORPHAN (PROMETHAZINE-DM) 6.25-15 MG/5 ML SYRP         No family  history on file.  Social History     Socioeconomic History    Marital status:    Tobacco Use    Smoking status: Former Smoker     Packs/day: 0.50     Years: 34.00     Pack years: 17.00     Types: Cigarettes     Quit date: 2019     Years since quittin.8    Smokeless tobacco: Never Used   Substance and Sexual Activity    Alcohol use: No     Comment: occasional    Drug use: No     Comment: 18 - cocaine and crack use - quit 17-18 years ago         Review of Systems   Constitutional: Negative for appetite change, chills, fever and unexpected weight change.   HENT: Negative for hearing loss, rhinorrhea, sore throat and voice change.    Eyes: Negative for photophobia and visual disturbance.   Respiratory: Negative for cough, choking and shortness of breath.    Cardiovascular: Negative for chest pain, palpitations and leg swelling.   Gastrointestinal: Positive for abdominal pain. Negative for blood in stool, constipation, diarrhea, nausea and vomiting.   Endocrine: Negative for cold intolerance, heat intolerance, polydipsia and polyuria.   Musculoskeletal: Negative for arthralgias, back pain, joint swelling and neck stiffness.   Skin: Negative for color change, pallor and rash.   Neurological: Negative for dizziness, seizures, syncope and headaches.   Hematological: Negative for adenopathy. Does not bruise/bleed easily.   Psychiatric/Behavioral: Negative for agitation, behavioral problems and confusion.       Objective:      Physical Exam  Constitutional:       General: He is not in acute distress.     Appearance: Normal appearance. He is well-developed. He is not toxic-appearing.   HENT:      Head: Normocephalic and atraumatic. No abrasion or laceration.      Right Ear: External ear normal.      Left Ear: External ear normal.      Nose: Nose normal.   Eyes:      Pupils: Pupils are equal, round, and reactive to light.   Neck:      Trachea: Trachea and phonation normal. No tracheal deviation.    Cardiovascular:      Rate and Rhythm: Normal rate and regular rhythm.   Pulmonary:      Effort: Pulmonary effort is normal. No tachypnea, accessory muscle usage or respiratory distress.   Abdominal:      General: There is no distension.      Palpations: Abdomen is soft.      Tenderness: There is no abdominal tenderness. There is no guarding or rebound.      Hernia: A hernia is present. Hernia is present in the ventral area.          Comments: Ventral hernia superior to the umbilicus.    Musculoskeletal:      Cervical back: Neck supple. Normal range of motion.   Lymphadenopathy:      Cervical: No cervical adenopathy.   Skin:     General: Skin is warm.   Neurological:      Mental Status: He is alert and oriented to person, place, and time.      Coordination: Coordination normal.      Gait: Gait normal.   Psychiatric:         Speech: Speech normal.         Behavior: Behavior normal.         Assessment/Plan:   Erick was seen today for hernia.    Diagnoses and all orders for this visit:    Ventral hernia with obstruction and without gangrene  -     Full code; Standing  -     Vital signs; Standing  -     Insert peripheral IV; Standing  -     Diet NPO; Standing  -     Place sequential compression device; Standing  -     Pulse Oximetry Q4H; Standing  -     Case Request Operating Room: ROBOTIC REPAIR, HERNIA, VENTRAL  -     Place in Outpatient; Standing  -     Basic metabolic panel; Future  -     CBC auto differential; Future  -     EKG 12-lead; Future  -     X-Ray Chest PA And Lateral; Future  -     Full code  -     Insert peripheral IV    Other orders  -     IP VTE LOW RISK PATIENT; Standing  -     clindamycin (CLEOCIN) 900 mg in dextrose 5 % 50 mL IVPB        will plan on robotic repair June 17. Risk and benefits of the procedure discussed.       I discussed the proposed procedures with the patient including risks, benefits, indications, alternatives and special concerns.  The patient appears to understand and agrees to  go ahead with surgery.  I have made no promises, warranties or verbal agreements beyond what was discussed above.

## 2022-06-14 ENCOUNTER — HOSPITAL ENCOUNTER (OUTPATIENT)
Dept: PREADMISSION TESTING | Facility: HOSPITAL | Age: 57
Discharge: HOME | End: 2022-06-14
Attending: SURGERY
Payer: MEDICARE

## 2022-06-14 ENCOUNTER — LAB VISIT (OUTPATIENT)
Dept: PRIMARY CARE CLINIC | Facility: CLINIC | Age: 57
End: 2022-06-14
Payer: MEDICARE

## 2022-06-14 VITALS — WEIGHT: 182 LBS | HEIGHT: 67 IN | BODY MASS INDEX: 28.56 KG/M2

## 2022-06-14 DIAGNOSIS — Z01.818 PREOP TESTING: ICD-10-CM

## 2022-06-14 DIAGNOSIS — K43.6 VENTRAL HERNIA WITH OBSTRUCTION AND WITHOUT GANGRENE: ICD-10-CM

## 2022-06-14 PROCEDURE — U0005 INFEC AGEN DETEC AMPLI PROBE: HCPCS | Performed by: SURGERY

## 2022-06-14 PROCEDURE — U0003 INFECTIOUS AGENT DETECTION BY NUCLEIC ACID (DNA OR RNA); SEVERE ACUTE RESPIRATORY SYNDROME CORONAVIRUS 2 (SARS-COV-2) (CORONAVIRUS DISEASE [COVID-19]), AMPLIFIED PROBE TECHNIQUE, MAKING USE OF HIGH THROUGHPUT TECHNOLOGIES AS DESCRIBED BY CMS-2020-01-R: HCPCS | Performed by: SURGERY

## 2022-06-14 PROCEDURE — 99900104 DSU ONLY-NO CHARGE-EA ADD'L HR (STAT)

## 2022-06-14 PROCEDURE — 99900103 DSU ONLY-NO CHARGE-INITIAL HR (STAT)

## 2022-06-14 RX ORDER — IBUPROFEN 200 MG
200 TABLET ORAL EVERY 6 HOURS PRN
COMMUNITY
End: 2023-01-10

## 2022-06-14 NOTE — DISCHARGE INSTRUCTIONS
To confirm, Your doctor has instructed you that surgery is scheduled for:     Please report to [facility], Registration the morning of surgery. You must check-in and receive a wristband before going to your procedure.    Pre-Op will call the afternoon prior to surgery between 1:00 and 6:00 PM with the final arrival time.  Phone number: [phone]    PLEASE NOTE:  The surgery schedule has many variables which may affect the time of your surgery case.  Family members should be available if your surgery time changes.  Plan to be here the day of your procedure between 4-6 hours.    MEDICATIONS:  TAKE ONLY THESE MEDICATIONS WITH A SMALL SIP OF WATER THE MORNING OF YOUR PROCEDURE:      DO NOT TAKE THESE MEDICATIONS 5-7 DAYS PRIOR to your procedure or per your surgeon's request:   ASPIRIN, ALEVE, ADVIL, IBUPROFEN, FISH OIL VITAMIN E, HERBALS  (May take Tylenol)    ONLY if you are prescribed any types of blood thinners such as:  Aspirin, Coumadin, Plavix, Pradaxa, Xarelto, Aggrenox, Effient, Eliquis, Savasya, Brilinta, or any other, ask your surgeon whether you should stop taking them and how long before surgery you should stop.  You may also need to verify with the prescribing physician if it is ok to stop your medication.      INSTRUCTIONS IMPORTANT!!  Do not eat or drink anything between midnight and the time of your procedure- this includes gum, mints, and candy.  Do not smoke or drink alcoholic beverages 24 hours prior to your procedure.  Shower the night before AND the morning of your procedure with a Chlorhexidine wash such as Hibiclens or Dial antibacterial soap from the neck down.  Do not get it on your face or in your eyes.  You may use your own shampoo and face wash. This helps your skin to be as bacteria free as possible.    If you wear contact lenses, dentures, hearing aids or glasses, bring a container to put them in during surgery and give to a family member for safe keeping.  Please  leave all jewelry, piercing's and valuables at home.   DO NOT remove hair from the surgery site.  Do not shave the incision site unless you are given specific instructions to do so.    ONLY if you have been diagnosed with sleep apnea please bring your C-PAP machine.  ONLY if you wear home oxygen please bring your portable oxygen tank the day of your procedure.  ONLY if you have a history of OPEN HEART SURGERY you will need a clearance from your Cardiologist per Anesthesia.      ONLY for patients requiring bowel prep, written instructions will be given by your doctor's office.  ONLY if you have a neuro stimulator, please bring the controller with you the morning of surgery  ONLY if a type and screen test is needed before surgery, please return:  If your doctor has scheduled you for an overnight stay, bring a small overnight bag with any personal items you need.  Make arrangements in advance for transportation home by a responsible adult.  It is not safe to drive a vehicle during the 24 hours after anesthesia.       [facility] will resume routine visitation for COVID-19 negative patients, including inpatients, outpatients, and  procedural areas. Visitors are still required to practice social distancing in waiting rooms, cafeterias, and common  areas. Visitors and patients should maintain six feet distance between those not in their group. Visitors will be  asked to leave if they exhibit symptoms of respiratory infection, have tested positive for COVID -19 in the last  ten days, have a pending COVID-19 test for symptoms.    All [facility] facilities and properties are tobacco free.  Smoking is NOT allowed.   If you have any questions about these instructions, call Pre-Op Admit  Nursing at [phone] or the Pre-Op Day Surgery Unit at [phone].

## 2022-06-15 LAB
SARS-COV-2 RNA RESP QL NAA+PROBE: NOT DETECTED
SARS-COV-2- CYCLE NUMBER: NORMAL

## 2022-06-16 ENCOUNTER — ANESTHESIA EVENT (OUTPATIENT)
Dept: SURGERY | Facility: HOSPITAL | Age: 57
End: 2022-06-16
Payer: MEDICARE

## 2022-06-16 RX ORDER — SODIUM CHLORIDE 0.9 % (FLUSH) 0.9 %
10 SYRINGE (ML) INJECTION
Status: CANCELLED | OUTPATIENT
Start: 2022-06-16

## 2022-06-16 RX ORDER — FENTANYL CITRATE 50 UG/ML
25 INJECTION, SOLUTION INTRAMUSCULAR; INTRAVENOUS EVERY 5 MIN PRN
Status: CANCELLED | OUTPATIENT
Start: 2022-06-16

## 2022-06-16 RX ORDER — OXYCODONE HYDROCHLORIDE 5 MG/1
5 TABLET ORAL
Status: CANCELLED | OUTPATIENT
Start: 2022-06-16

## 2022-06-16 RX ORDER — METOCLOPRAMIDE HYDROCHLORIDE 5 MG/ML
10 INJECTION INTRAMUSCULAR; INTRAVENOUS EVERY 10 MIN PRN
Status: CANCELLED | OUTPATIENT
Start: 2022-06-16

## 2022-06-17 ENCOUNTER — TELEPHONE (OUTPATIENT)
Dept: SURGERY | Facility: CLINIC | Age: 57
End: 2022-06-17
Payer: MEDICARE

## 2022-06-17 ENCOUNTER — HOSPITAL ENCOUNTER (OUTPATIENT)
Facility: HOSPITAL | Age: 57
Discharge: HOME | End: 2022-06-17
Attending: SURGERY | Admitting: SURGERY
Payer: MEDICARE

## 2022-06-17 ENCOUNTER — ANESTHESIA (OUTPATIENT)
Dept: SURGERY | Facility: HOSPITAL | Age: 57
End: 2022-06-17
Payer: MEDICARE

## 2022-06-17 DIAGNOSIS — K43.6 VENTRAL HERNIA WITH OBSTRUCTION AND WITHOUT GANGRENE: ICD-10-CM

## 2022-06-17 PROCEDURE — 63600175 PHARM REV CODE 636 W HCPCS: Performed by: ANESTHESIOLOGY

## 2022-06-17 PROCEDURE — 25000003 PHARM REV CODE 250: Performed by: ANESTHESIOLOGY

## 2022-06-17 PROCEDURE — C9290 INJ, BUPIVACAINE LIPOSOME: HCPCS | Performed by: SURGERY

## 2022-06-17 PROCEDURE — C1781 MESH (IMPLANTABLE): HCPCS | Performed by: SURGERY

## 2022-06-17 PROCEDURE — 36000710: Performed by: SURGERY

## 2022-06-17 PROCEDURE — 99900103 DSU ONLY-NO CHARGE-INITIAL HR (STAT): Performed by: SURGERY

## 2022-06-17 PROCEDURE — D9220A PRA ANESTHESIA: Mod: CRNA,,, | Performed by: NURSE ANESTHETIST, CERTIFIED REGISTERED

## 2022-06-17 PROCEDURE — 27201423 OPTIME MED/SURG SUP & DEVICES STERILE SUPPLY: Performed by: SURGERY

## 2022-06-17 PROCEDURE — 49653 PR LAP VENTRAL/UMBILICAL HERNIA; INCARCERATED OR STRANGULATED: ICD-10-PCS | Mod: ,,, | Performed by: SURGERY

## 2022-06-17 PROCEDURE — 71000015 HC POSTOP RECOV 1ST HR: Performed by: SURGERY

## 2022-06-17 PROCEDURE — 99900104 DSU ONLY-NO CHARGE-EA ADD'L HR (STAT): Performed by: SURGERY

## 2022-06-17 PROCEDURE — 63600175 PHARM REV CODE 636 W HCPCS: Performed by: NURSE ANESTHETIST, CERTIFIED REGISTERED

## 2022-06-17 PROCEDURE — 25000003 PHARM REV CODE 250: Performed by: SURGERY

## 2022-06-17 PROCEDURE — 37000009 HC ANESTHESIA EA ADD 15 MINS: Performed by: SURGERY

## 2022-06-17 PROCEDURE — 71000033 HC RECOVERY, INTIAL HOUR: Performed by: SURGERY

## 2022-06-17 PROCEDURE — 36000711: Performed by: SURGERY

## 2022-06-17 PROCEDURE — 25000003 PHARM REV CODE 250: Performed by: NURSE ANESTHETIST, CERTIFIED REGISTERED

## 2022-06-17 PROCEDURE — 37000008 HC ANESTHESIA 1ST 15 MINUTES: Performed by: SURGERY

## 2022-06-17 PROCEDURE — D9220A PRA ANESTHESIA: Mod: ANES,,, | Performed by: ANESTHESIOLOGY

## 2022-06-17 PROCEDURE — 63600175 PHARM REV CODE 636 W HCPCS: Performed by: SURGERY

## 2022-06-17 PROCEDURE — 71000039 HC RECOVERY, EACH ADD'L HOUR: Performed by: SURGERY

## 2022-06-17 PROCEDURE — D9220A PRA ANESTHESIA: ICD-10-PCS | Mod: CRNA,,, | Performed by: NURSE ANESTHETIST, CERTIFIED REGISTERED

## 2022-06-17 PROCEDURE — D9220A PRA ANESTHESIA: ICD-10-PCS | Mod: ANES,,, | Performed by: ANESTHESIOLOGY

## 2022-06-17 PROCEDURE — 49653 PR LAP VENTRAL/UMBILICAL HERNIA; INCARCERATED OR STRANGULATED: CPT | Mod: ,,, | Performed by: SURGERY

## 2022-06-17 DEVICE — MESH PARIETENE COMP RND 12CM: Type: IMPLANTABLE DEVICE | Site: ABDOMEN | Status: FUNCTIONAL

## 2022-06-17 RX ORDER — SODIUM CHLORIDE 0.9 % (FLUSH) 0.9 %
3 SYRINGE (ML) INJECTION
Status: DISCONTINUED | OUTPATIENT
Start: 2022-06-17 | End: 2022-06-17 | Stop reason: HOSPADM

## 2022-06-17 RX ORDER — HYDROCODONE BITARTRATE AND ACETAMINOPHEN 5; 325 MG/1; MG/1
1 TABLET ORAL EVERY 6 HOURS PRN
Qty: 28 TABLET | Refills: 0 | Status: SHIPPED | OUTPATIENT
Start: 2022-06-17 | End: 2022-06-20 | Stop reason: SDUPTHER

## 2022-06-17 RX ORDER — MEPERIDINE HYDROCHLORIDE 50 MG/ML
12.5 INJECTION INTRAMUSCULAR; INTRAVENOUS; SUBCUTANEOUS ONCE AS NEEDED
Status: DISCONTINUED | OUTPATIENT
Start: 2022-06-17 | End: 2022-06-17 | Stop reason: HOSPADM

## 2022-06-17 RX ORDER — HYDROMORPHONE HYDROCHLORIDE 2 MG/ML
0.2 INJECTION, SOLUTION INTRAMUSCULAR; INTRAVENOUS; SUBCUTANEOUS EVERY 5 MIN PRN
Status: DISCONTINUED | OUTPATIENT
Start: 2022-06-17 | End: 2022-06-17 | Stop reason: HOSPADM

## 2022-06-17 RX ORDER — BUPIVACAINE HYDROCHLORIDE AND EPINEPHRINE 5; 5 MG/ML; UG/ML
INJECTION, SOLUTION EPIDURAL; INTRACAUDAL; PERINEURAL
Status: DISCONTINUED | OUTPATIENT
Start: 2022-06-17 | End: 2022-06-17 | Stop reason: HOSPADM

## 2022-06-17 RX ORDER — MIDAZOLAM HYDROCHLORIDE 1 MG/ML
INJECTION INTRAMUSCULAR; INTRAVENOUS
Status: DISCONTINUED | OUTPATIENT
Start: 2022-06-17 | End: 2022-06-17

## 2022-06-17 RX ORDER — FENTANYL CITRATE 50 UG/ML
25 INJECTION, SOLUTION INTRAMUSCULAR; INTRAVENOUS EVERY 5 MIN PRN
Status: COMPLETED | OUTPATIENT
Start: 2022-06-17 | End: 2022-06-17

## 2022-06-17 RX ORDER — DIPHENHYDRAMINE HYDROCHLORIDE 50 MG/ML
25 INJECTION INTRAMUSCULAR; INTRAVENOUS EVERY 6 HOURS PRN
Status: DISCONTINUED | OUTPATIENT
Start: 2022-06-17 | End: 2022-06-17 | Stop reason: HOSPADM

## 2022-06-17 RX ORDER — SODIUM CHLORIDE, SODIUM LACTATE, POTASSIUM CHLORIDE, CALCIUM CHLORIDE 600; 310; 30; 20 MG/100ML; MG/100ML; MG/100ML; MG/100ML
INJECTION, SOLUTION INTRAVENOUS CONTINUOUS
Status: DISCONTINUED | OUTPATIENT
Start: 2022-06-17 | End: 2022-06-17 | Stop reason: HOSPADM

## 2022-06-17 RX ORDER — ONDANSETRON HYDROCHLORIDE 2 MG/ML
INJECTION, SOLUTION INTRAMUSCULAR; INTRAVENOUS
Status: DISCONTINUED | OUTPATIENT
Start: 2022-06-17 | End: 2022-06-17

## 2022-06-17 RX ORDER — LIDOCAINE HYDROCHLORIDE 10 MG/ML
1 INJECTION, SOLUTION EPIDURAL; INFILTRATION; INTRACAUDAL; PERINEURAL ONCE
Status: COMPLETED | OUTPATIENT
Start: 2022-06-17 | End: 2022-06-17

## 2022-06-17 RX ORDER — PROPOFOL 10 MG/ML
VIAL (ML) INTRAVENOUS
Status: DISCONTINUED | OUTPATIENT
Start: 2022-06-17 | End: 2022-06-17

## 2022-06-17 RX ORDER — PHENYLEPHRINE HYDROCHLORIDE 10 MG/ML
INJECTION INTRAVENOUS
Status: DISCONTINUED | OUTPATIENT
Start: 2022-06-17 | End: 2022-06-17

## 2022-06-17 RX ORDER — SODIUM CHLORIDE 0.9 % (FLUSH) 0.9 %
3 SYRINGE (ML) INJECTION EVERY 8 HOURS
Status: DISCONTINUED | OUTPATIENT
Start: 2022-06-17 | End: 2022-06-17 | Stop reason: HOSPADM

## 2022-06-17 RX ORDER — NEOSTIGMINE METHYLSULFATE 1 MG/ML
INJECTION, SOLUTION INTRAVENOUS
Status: DISCONTINUED | OUTPATIENT
Start: 2022-06-17 | End: 2022-06-17

## 2022-06-17 RX ORDER — LIDOCAINE HCL/PF 100 MG/5ML
SYRINGE (ML) INTRAVENOUS
Status: DISCONTINUED | OUTPATIENT
Start: 2022-06-17 | End: 2022-06-17

## 2022-06-17 RX ORDER — ACETAMINOPHEN 10 MG/ML
INJECTION, SOLUTION INTRAVENOUS
Status: DISCONTINUED | OUTPATIENT
Start: 2022-06-17 | End: 2022-06-17

## 2022-06-17 RX ORDER — OXYCODONE HYDROCHLORIDE 5 MG/1
5 TABLET ORAL
Status: DISCONTINUED | OUTPATIENT
Start: 2022-06-17 | End: 2022-06-17 | Stop reason: HOSPADM

## 2022-06-17 RX ORDER — FENTANYL CITRATE 50 UG/ML
INJECTION, SOLUTION INTRAMUSCULAR; INTRAVENOUS
Status: DISCONTINUED | OUTPATIENT
Start: 2022-06-17 | End: 2022-06-17

## 2022-06-17 RX ORDER — DEXAMETHASONE SODIUM PHOSPHATE 4 MG/ML
INJECTION, SOLUTION INTRA-ARTICULAR; INTRALESIONAL; INTRAMUSCULAR; INTRAVENOUS; SOFT TISSUE
Status: DISCONTINUED | OUTPATIENT
Start: 2022-06-17 | End: 2022-06-17

## 2022-06-17 RX ORDER — ONDANSETRON 2 MG/ML
4 INJECTION INTRAMUSCULAR; INTRAVENOUS DAILY PRN
Status: DISCONTINUED | OUTPATIENT
Start: 2022-06-17 | End: 2022-06-17 | Stop reason: HOSPADM

## 2022-06-17 RX ORDER — CLINDAMYCIN PHOSPHATE 900 MG/50ML
900 INJECTION, SOLUTION INTRAVENOUS
Status: COMPLETED | OUTPATIENT
Start: 2022-06-17 | End: 2022-06-17

## 2022-06-17 RX ORDER — KETOROLAC TROMETHAMINE 30 MG/ML
INJECTION, SOLUTION INTRAMUSCULAR; INTRAVENOUS
Status: DISCONTINUED | OUTPATIENT
Start: 2022-06-17 | End: 2022-06-17

## 2022-06-17 RX ORDER — ROCURONIUM BROMIDE 10 MG/ML
INJECTION, SOLUTION INTRAVENOUS
Status: DISCONTINUED | OUTPATIENT
Start: 2022-06-17 | End: 2022-06-17

## 2022-06-17 RX ADMIN — PROPOFOL 150 MG: 10 INJECTION, EMULSION INTRAVENOUS at 07:06

## 2022-06-17 RX ADMIN — PHENYLEPHRINE HYDROCHLORIDE 200 MCG: 10 INJECTION INTRAVENOUS at 07:06

## 2022-06-17 RX ADMIN — SODIUM CHLORIDE, SODIUM GLUCONATE, SODIUM ACETATE, POTASSIUM CHLORIDE, MAGNESIUM CHLORIDE, SODIUM PHOSPHATE, DIBASIC, AND POTASSIUM PHOSPHATE: .53; .5; .37; .037; .03; .012; .00082 INJECTION, SOLUTION INTRAVENOUS at 08:06

## 2022-06-17 RX ADMIN — PHENYLEPHRINE HYDROCHLORIDE 200 MCG: 10 INJECTION INTRAVENOUS at 08:06

## 2022-06-17 RX ADMIN — GLYCOPYRROLATE 0.4 MG: 0.2 INJECTION, SOLUTION INTRAMUSCULAR; INTRAVITREAL at 09:06

## 2022-06-17 RX ADMIN — MIDAZOLAM HYDROCHLORIDE 2 MG: 1 INJECTION, SOLUTION INTRAMUSCULAR; INTRAVENOUS at 07:06

## 2022-06-17 RX ADMIN — FENTANYL CITRATE 25 MCG: 50 INJECTION INTRAMUSCULAR; INTRAVENOUS at 10:06

## 2022-06-17 RX ADMIN — ACETAMINOPHEN 1000 MG: 10 INJECTION, SOLUTION INTRAVENOUS at 07:06

## 2022-06-17 RX ADMIN — ONDANSETRON 4 MG: 2 INJECTION, SOLUTION INTRAMUSCULAR; INTRAVENOUS at 07:06

## 2022-06-17 RX ADMIN — ROCURONIUM BROMIDE 5 MG: 10 INJECTION, SOLUTION INTRAVENOUS at 07:06

## 2022-06-17 RX ADMIN — LIDOCAINE HYDROCHLORIDE 100 MG: 20 INJECTION INTRAVENOUS at 07:06

## 2022-06-17 RX ADMIN — SODIUM CHLORIDE, SODIUM GLUCONATE, SODIUM ACETATE, POTASSIUM CHLORIDE, MAGNESIUM CHLORIDE, SODIUM PHOSPHATE, DIBASIC, AND POTASSIUM PHOSPHATE: .53; .5; .37; .037; .03; .012; .00082 INJECTION, SOLUTION INTRAVENOUS at 06:06

## 2022-06-17 RX ADMIN — LIDOCAINE HYDROCHLORIDE 2 MG: 10 INJECTION, SOLUTION EPIDURAL; INFILTRATION; INTRACAUDAL; PERINEURAL at 06:06

## 2022-06-17 RX ADMIN — DEXAMETHASONE SODIUM PHOSPHATE 4 MG: 4 INJECTION, SOLUTION INTRA-ARTICULAR; INTRALESIONAL; INTRAMUSCULAR; INTRAVENOUS; SOFT TISSUE at 07:06

## 2022-06-17 RX ADMIN — CLINDAMYCIN PHOSPHATE 900 MG: 18 INJECTION, SOLUTION INTRAVENOUS at 07:06

## 2022-06-17 RX ADMIN — PHENYLEPHRINE HYDROCHLORIDE 200 MCG: 10 INJECTION INTRAVENOUS at 09:06

## 2022-06-17 RX ADMIN — ROCURONIUM BROMIDE 35 MG: 10 INJECTION, SOLUTION INTRAVENOUS at 07:06

## 2022-06-17 RX ADMIN — KETOROLAC TROMETHAMINE 30 MG: 30 INJECTION, SOLUTION INTRAMUSCULAR; INTRAVENOUS at 09:06

## 2022-06-17 RX ADMIN — NEOSTIGMINE METHYLSULFATE 3 MG: 1 INJECTION INTRAVENOUS at 09:06

## 2022-06-17 RX ADMIN — OXYCODONE 5 MG: 5 TABLET ORAL at 10:06

## 2022-06-17 RX ADMIN — FENTANYL CITRATE 50 MCG: 50 INJECTION, SOLUTION INTRAMUSCULAR; INTRAVENOUS at 09:06

## 2022-06-17 RX ADMIN — SUGAMMADEX 200 MG: 100 INJECTION, SOLUTION INTRAVENOUS at 09:06

## 2022-06-17 RX ADMIN — GLYCOPYRROLATE 0.2 MG: 0.2 INJECTION, SOLUTION INTRAMUSCULAR; INTRAVITREAL at 07:06

## 2022-06-17 RX ADMIN — FENTANYL CITRATE 50 MCG: 50 INJECTION, SOLUTION INTRAMUSCULAR; INTRAVENOUS at 07:06

## 2022-06-17 NOTE — DISCHARGE SUMMARY
Discharge Summary  General Surgery      Admit Date: 6/17/2022    Discharge Date :6/17/2022    Attending Physician: Phillip Chao III     Discharge Physician: Phillip Chao III    Discharged Condition: good    Discharge Diagnosis: Ventral hernia with obstruction and without gangrene [K43.6]    Treatments/Procedures: Procedure(s) (LRB):  ROBOTIC REPAIR, HERNIA, VENTRAL (N/A)    Hospital Course: Uneventful; Discharged home from Recovery    Significant Diagnostic Studies: none    Disposition: Home or Self Care    Diet: Regular    Follow up: Office 10-14 days    Activity: No heavy lifting till seen in office.    Patient Instructions:   Current Discharge Medication List      START taking these medications    Details   HYDROcodone-acetaminophen (NORCO) 5-325 mg per tablet Take 1 tablet by mouth every 6 (six) hours as needed for Pain.  Qty: 28 tablet, Refills: 0    Comments: Quantity prescribed more than 7 day supply? No         CONTINUE these medications which have NOT CHANGED    Details   ALPRAZolam (XANAX) 1 MG tablet TAKE ONE TABLET BY MOUTH THREE TIMES DAILY AS NEEDED FOR ANXIETY.  Qty: 90 tablet, Refills: 1      atenoloL (TENORMIN) 25 MG tablet TAKE ONE TABLET BY MOUTH ONCE DAILY  Qty: 30 tablet, Refills: 6      budesonide-glycopyr-formoterol (BREZTRI AEROSPHERE) 160-9-4.8 mcg/actuation HFAA Inhale 2 puffs into the lungs 2 (two) times daily.  Qty: 10.7 g, Refills: 6      ibuprofen (ADVIL,MOTRIN) 200 MG tablet Take 200 mg by mouth every 6 (six) hours as needed for Pain.      aspirin (ECOTRIN) 81 MG EC tablet Every day             Discharge Procedure Orders   Diet Adult Regular     Lifting restrictions   Order Comments: No lifting over twenty pounds for six weeks     Remove dressing in 48 hours

## 2022-06-17 NOTE — ANESTHESIA POSTPROCEDURE EVALUATION
Anesthesia Post Evaluation    Patient: Erick Graves Jr.    Procedure(s) Performed: Procedure(s) (LRB):  ROBOTIC REPAIR, HERNIA, VENTRAL (N/A)    Final Anesthesia Type: general      Patient location during evaluation: PACU  Patient participation: Yes- Able to Participate  Level of consciousness: awake and alert and oriented  Post-procedure vital signs: reviewed and stable  Pain management: adequate  Airway patency: patent    PONV status at discharge: No PONV  Anesthetic complications: no      Cardiovascular status: blood pressure returned to baseline  Respiratory status: unassisted, spontaneous ventilation and room air  Hydration status: euvolemic  Follow-up not needed.          Vitals Value Taken Time   /89 06/17/22 1056   Temp 36.6 °C (97.9 °F) 06/17/22 1025   Pulse 66 06/17/22 1059   Resp 11 06/17/22 1059   SpO2 97 % 06/17/22 1059   Vitals shown include unvalidated device data.      No case tracking events are documented in the log.      Pain/Melquiades Score: Pain Rating Prior to Med Admin: 5 (6/17/2022 10:45 AM)  Pain Rating Post Med Admin: 5 (6/17/2022 10:40 AM)  Melquiades Score: 10 (6/17/2022 10:45 AM)

## 2022-06-17 NOTE — ANESTHESIA PROCEDURE NOTES
Intubation    Date/Time: 6/17/2022 7:42 AM  Performed by: Inder Noguera CRNA  Authorized by: Case Cornelius MD     Intubation:     Induction:  Intravenous    Intubated:  Postinduction    Mask Ventilation:  Easy mask    Attempts:  1    Attempted By:  CRNA    Method of Intubation:  Video laryngoscopy    Blade:  Izquierdo 3    Laryngeal View Grade: Grade I - full view of cords      Difficult Airway Encountered?: No      Complications:  None    Airway Device:  Oral endotracheal tube    Airway Device Size:  7.5    Style/Cuff Inflation:  Cuffed (inflated to minimal occlusive pressure)    Tube secured:  23    Secured at:  The lips    Placement Verified By:  Capnometry    Complicating Factors:  None    Findings Post-Intubation:  BS equal bilateral and atraumatic/condition of teeth unchanged  Notes:      Dentures removed, placed in denture cup and put with chart.

## 2022-06-17 NOTE — OP NOTE
Surgery Date: 6/17/2022     Surgeon(s) and Role:     * Phillip Chao III, MD - Primary    Assist Yeni Lo first assist     Pre-op Diagnosis:  Ventral hernia with obstruction and without gangrene [K43.6]    Post-op Diagnosis:  Post-Op Diagnosis Codes:     * Ventral hernia with obstruction and without gangrene [K43.6]    Procedure(s) (LRB):  ROBOTIC REPAIR, HERNIA, VENTRAL (N/A)- fascial closure and mesh underlay with 12 cm circular mesh     Anesthesia: General    Operative Findings: Patient brought to the operating room transferred to the operating room table supine position.  He was given general anesthesia and intubated.  Abdomen was prepped and draped.  A small left upper quadrant incision was made.  The abdomen was insufflated using Veress needle.  A 5 mm Visiport was used to gain entrance into the abdomen. He had two small adjacent hernias - one at the umbilicus and one at just superior to it  by a small fascial bridge. They both measured about 1.5 cm apiece. The robotic camera port was placed in the right lateral abdomen.  This was about 15 cm from the hernia edge.  The right robotic port was placed 8 cm lateral to the camera port in the right lower quadrant. The left robotic arm was placed 8 cm lateral in the right upper quadrant.  The robot was docked to the ports.  Instruments were placed into the abdomen.  I then scrubbed out and went to the console. He had some omentum incarcerated into the superior defect. It was reduced. The falciform was dissected back a few cm's to create a flat surface for the mesh.  The hernia defects were closed with a running number 1 permanent barbed suture.  12 cm circular mesh was chosen for repair.  The mesh was placed into the abdomen.  The mesh was secured to the abdominal wall using running V lock barbed suture.  Several V lock sutures were used in a running fashion to secure the mesh edges to the abdominal wall.  We had good overlap of mesh edges to the  hernia defect.  The mesh was sutured securely and flat the abdominal wall. A 6 cm peritoneal flap was created and sutured back over the mesh. The mesh was partially covered with peritoneum. The ports were removed.  The robot was undocked.  Abdomen was deflated.  Skin sites were sutured with a running 4-0 Monocryl.  Incisions were bandaged.  He was extubated and brought to recovery room in stable condition.  Complications  None   instrument counts correct   Estimated Blood Loss: 1 mL    Estimated Blood Loss has been documented.         Specimens:   Specimen (24h ago, onward)            None          [id number]

## 2022-06-17 NOTE — TELEPHONE ENCOUNTER
----- Message from Juan Diego May sent at 6/17/2022  3:24 PM CDT -----  Contact: pt's friend Mariam at [phone]  Type: Needs Medical Advice  Who Called:  pt's friend Mariam  Best Call Back Number: [phone]  Additional Information: pt's friend Mariam is calling the office to let them know her friend needs some pain medication and the pharmacy said the dr had to release his medication that was sent over and he really needs it. Please call back and advise.  PER HIS FRIEND PLEASE CALL BACK BEFORE THE CLOSE OF BUSINESS TODAY

## 2022-06-17 NOTE — TELEPHONE ENCOUNTER
Spoke to patient with Mariam sanchez on voicemail states [facility] will not release pain medication due to codeine allergie on file. informed message will be forwarded to Dr Chao to review and advise . Per patient  He has taken hydrocodone on numerous occassions with any reactions .noted and will f/u with [facility]autista Baca.  understanding verbalized

## 2022-06-17 NOTE — BRIEF OP NOTE
[facility]  Brief Operative Note    SUMMARY     Surgery Date: 6/17/2022     Surgeon(s) and Role:     * Phillip Chao III, MD - Primary    Assist Yeni Lo first assist     Pre-op Diagnosis:  Ventral hernia with obstruction and without gangrene [K43.6]    Post-op Diagnosis:  Post-Op Diagnosis Codes:     * Ventral hernia with obstruction and without gangrene [K43.6]    Procedure(s) (LRB):  ROBOTIC REPAIR, HERNIA, VENTRAL (N/A)- fascial closure and mesh underlay with 12 cm circular mesh     Anesthesia: General    Operative Findings: Patient brought to the operating room transferred to the operating room table supine position.  He was given general anesthesia and intubated.  Abdomen was prepped and draped.  A small left upper quadrant incision was made.  The abdomen was insufflated using Veress needle.  A 5 mm Visiport was used to gain entrance into the abdomen. He had two small adjacent hernias - one at the umbilicus and one at just superior to it  by a small fascial bridge. They both measured about 1.5 cm apiece. The robotic camera port was placed in the right lateral abdomen.  This was about 15 cm from the hernia edge.  The right robotic port was placed 8 cm lateral to the camera port in the right lower quadrant. The left robotic arm was placed 8 cm lateral in the right upper quadrant.  The robot was docked to the ports.  Instruments were placed into the abdomen.  I then scrubbed out and went to the console. He had some omentum incarcerated into the superior defect. It was reduced. The falciform was dissected back a few cm's to create a flat surface for the mesh.  The hernia defects were closed with a running number 1 permanent barbed suture.  12 cm circular mesh was chosen for repair.  The mesh was placed into the abdomen.  The mesh was secured to the abdominal wall using running V lock barbed suture.  Several V lock sutures were used in a running fashion to secure the mesh  edges to the abdominal wall.  We had good overlap of mesh edges to the hernia defect.  The mesh was sutured securely and flat the abdominal wall. A 6 cm peritoneal flap was created and sutured back over the mesh. The mesh was partially covered with peritoneum. The ports were removed.  The robot was undocked.  Abdomen was deflated.  Skin sites were sutured with a running 4-0 Monocryl.  Incisions were bandaged.  He was extubated and brought to recovery room in stable condition.  Complications  None   instrument counts correct   Estimated Blood Loss: 1 mL    Estimated Blood Loss has been documented.         Specimens:   Specimen (24h ago, onward)            None          [id number]

## 2022-06-17 NOTE — DISCHARGE INSTRUCTIONS
We hope your stay was comfortable as you heal now, mend and rest.    For we have enjoyed taking care of you by giving your our best.    And as you get better, by regaining your health and strength;   We count it as a privilege to have served you and hope your time at [facility] was well spent.      Thank  You!!!       Post op instructions for prevention of DVT  What is deep vein thrombosis?  Deep vein thrombosis (DVT) is the medical term for blood clots in the deep veins of the leg.  These blood clots can be dangerous.  A DVT can block a blood vessel and keep blood from getting where it needs to go.  Another problem is that the clot can travel to other parts of the body such as the lungs.  A clot that travels to the lungs is called a pulmonary embolus (PE) and can cause serious problems with breathing which can lead to death.  Am I at risk for DVT/PE?  If you are not very active, you are at risk of DVT.  Anyone confined to bed, sitting for long periods of time, recovering from surgery, etc. increases the risk of DVT.  Other risk factors are cancer diagnosis, certain medications, estrogen replacement in any form,older age, obesity, pregnancy, smoking, history of clotting disorders, and dehydration.  How will I know if I have a DVT?  Swelling in the lower leg  Pain  Warmth, redness, hardness or bulging of the vein  If you have any of these symptoms, call your doctors office right away.  Some people will not have any symptoms until the clot moves to the lungs.  What are the symptoms of a PE?  Panting, shortness of breath, or trouble breathing  Sharp, knife-like chest pain when you breathe  Coughing or coughing up blood  Rapid heartbeat  If you have any of these symptoms or get worse quickly, call 911 for emergency treatment.  How can I prevent a DVT?  Avoid long periods of inactivity and dont cross your legs--get up and walk around every hour or so.  Stay active--walking after surgery is highly encouraged.  This means  "you should get out of the house and walk in the neighborhood.  Going up and down stairs will not impair healing (unless advised against such activity by your doctor).    Drink plenty of noncaffeinated, nonalcoholic fluids each day to prevent dehydration.  Wear special support stockings, if they have been advised by your doctor.  If you travel, stop at least once an hour and walk around.  Avoid smoking (assistance with stopping is available through your healthcare provider)  Always notify your doctor if you are not able to follow the post operative instructions that are given to you at the time of discharge.  It may be necessary to prescribe one of the medications available to prevent DVT.       Discharge Instructions: After Your Surgery/Procedure  Youve just had surgery. During surgery you were given medicine called anesthesia to keep you relaxed and free of pain. After surgery you may have some pain or nausea. This is common. Here are some tips for feeling better and getting well after surgery.     Stay on schedule with your medication.   Going home  Your doctor or nurse will show you how to take care of yourself when you go home. He or she will also answer your questions. Have an adult family member or friend drive you home.      For your safety we recommend these precaution for the first 24 hours after your procedure:  Do not drive or use heavy equipment.  Do not make important decisions or sign legal papers.  Do not drink alcohol.  Have someone stay with you, if needed. He or she can watch for problems and help keep you safe.  Your concentration, balance, coordination, and judgement may be impaired for many hours after anesthesia.  Use caution when ambulating or standing up.     You may feel weak and "washed out" after anesthesia and surgery.      Subtle residual effects of general anesthesia or sedation with regional / local anesthesia can last more than 24 hours.  Rest for the remainder of the day or longer " if your Doctor/Surgeon has advised you to do so.  Although you may feel normal within the first 24 hours, your reflexes and mental ability may be impaired without you realizing it.  You may feel dizzy, lightheaded or sleepy for 24 hours or longer.      Be sure to go to all follow-up visits with your doctor. And rest after your surgery for as long as your doctor tells you to.  Coping with pain  If you have pain after surgery, pain medicine will help you feel better. Take it as told, before pain becomes severe. Also, ask your doctor or pharmacist about other ways to control pain. This might be with heat, ice, or relaxation. And follow any other instructions your surgeon or nurse gives you.  Tips for taking pain medicine  To get the best relief possible, remember these points:  Pain medicines can upset your stomach. Taking them with a little food may help.  Most pain relievers taken by mouth need at least 20 to 30 minutes to start to work.  Taking medicine on a schedule can help you remember to take it. Try to time your medicine so that you can take it before starting an activity. This might be before you get dressed, go for a walk, or sit down for dinner.  Constipation is a common side effect of pain medicines. Call your doctor before taking any medicines such as laxatives or stool softeners to help ease constipation. Also ask if you should skip any foods. Drinking lots of fluids and eating foods such as fruits and vegetables that are high in fiber can also help. Remember, do not take laxatives unless your surgeon has prescribed them.  Drinking alcohol and taking pain medicine can cause dizziness and slow your breathing. It can even be deadly. Do not drink alcohol while taking pain medicine.  Pain medicine can make you react more slowly to things. Do not drive or run machinery while taking pain medicine.  Your health care provider may tell you to take acetaminophen to help ease your pain. Ask him or her how much you  are supposed to take each day. Acetaminophen or other pain relievers may interact with your prescription medicines or other over-the-counter (OTC) drugs. Some prescription medicines have acetaminophen and other ingredients. Using both prescription and OTC acetaminophen for pain can cause you to overdose. Read the labels on your OTC medicines with care. This will help you to clearly know the list of ingredients, how much to take, and any warnings. It may also help you not take too much acetaminophen. If you have questions or do not understand the information, ask your pharmacist or health care provider to explain it to you before you take the OTC medicine.  Managing nausea  Some people have an upset stomach after surgery. This is often because of anesthesia, pain, or pain medicine, or the stress of surgery. These tips will help you handle nausea and eat healthy foods as you get better. If you were on a special food plan before surgery, ask your doctor if you should follow it while you get better. These tips may help:  Do not push yourself to eat. Your body will tell you when to eat and how much.  Start off with clear liquids and soup. They are easier to digest.  Next try semi-solid foods, such as mashed potatoes, applesauce, and gelatin, as you feel ready.  Slowly move to solid foods. Dont eat fatty, rich, or spicy foods at first.  Do not force yourself to have 3 large meals a day. Instead eat smaller amounts more often.  Take pain medicines with a small amount of solid food, such as crackers or toast, to avoid nausea.     Call your surgeon if  You still have pain an hour after taking medicine. The medicine may not be strong enough.  You feel too sleepy, dizzy, or groggy. The medicine may be too strong.  You have side effects like nausea, vomiting, or skin changes, such as rash, itching, or hives.       If you have obstructive sleep apnea  You were given anesthesia medicine during surgery to keep you comfortable  and free of pain. After surgery, you may have more apnea spells because of this medicine and other medicines you were given. The spells may last longer than usual.   At home:  Keep using the continuous positive airway pressure (CPAP) device when you sleep. Unless your health care provider tells you not to, use it when you sleep, day or night. CPAP is a common device used to treat obstructive sleep apnea.  Talk with your provider before taking any pain medicine, muscle relaxants, or sedatives. Your provider will tell you about the possible dangers of taking these medicines.  © 8457-1689 [employer]. [address] [place] [address], [place] [address]. All rights reserved. This information is not intended as a substitute for professional medical care. Always follow your healthcare professional's instructions.

## 2022-06-17 NOTE — TRANSFER OF CARE
"Anesthesia Transfer of Care Note    Patient: Erick Graves Jr.    Procedure(s) Performed: Procedure(s) (LRB):  ROBOTIC REPAIR, HERNIA, VENTRAL (N/A)    Patient location: PACU    Anesthesia Type: general    Transport from OR: Transported from OR on 2-3 L/min O2 by NC with adequate spontaneous ventilation    Post pain: adequate analgesia    Post assessment: no apparent anesthetic complications and tolerated procedure well    Post vital signs: stable    Level of consciousness: sedated and responds to stimulation    Nausea/Vomiting: no nausea/vomiting    Complications: none    Transfer of care protocol was followed      Last vitals:   Visit Vitals  /85   Pulse 72   Temp 36.4 °C (97.5 °F) (Temporal)   Resp 16   Ht 5' 7" (1.702 m)   Wt 82.6 kg (182 lb)   SpO2 98%   BMI 28.51 kg/m²     "

## 2022-06-17 NOTE — PLAN OF CARE
Report to sabrina. Patient denies pain, no nausea, dressing to abdomen dry intact no drainage, ice to abdomen, vs stable, resting comfortably, friend in attendance

## 2022-06-17 NOTE — ANESTHESIA PREPROCEDURE EVALUATION
06/17/2022  Erick Graves Jr. is a 57 y.o., male.      Pre-op Assessment    I have reviewed the Patient Summary Reports.     I have reviewed the Nursing Notes. I have reviewed the NPO Status.   I have reviewed the Medications.     Review of Systems  Anesthesia Hx:  No problems with previous Anesthesia Denies Hx of Anesthetic complications    Social:  Former Smoker    Cardiovascular:   Hypertension Denies MI.  Denies CAD.    Denies CABG/stent.   Denies Angina. hyperlipidemia    Pulmonary:   COPD Denies Asthma. Shortness of breath  Denies Recent URI.    Renal/:   Denies Chronic Renal Disease.     Hepatic/GI:   PUD, Denies GERD. Denies Liver Disease. Hepatitis, C    Neurological:   Denies TIA. Denies CVA. Denies Seizures.    Endocrine:   Denies Diabetes. Denies Hypothyroidism.    Psych:   Denies Psychiatric History.          Physical Exam  General: Well nourished, Cooperative, Alert and Oriented    Airway:  Mallampati: II / II  Mouth Opening: Normal  TM Distance: 4 - 6 cm  Tongue: Normal    Dental:  Intact    Chest/Lungs:  Clear to auscultation, Normal Respiratory Rate    Heart:  Rate: Normal  Rhythm: Regular Rhythm  Sounds: Normal        Anesthesia Plan  Type of Anesthesia, risks & benefits discussed:    Anesthesia Type: Gen ETT  Intra-op Monitoring Plan: Standard ASA Monitors  Induction:  IV  Informed Consent: Informed consent signed with the Patient and all parties understand the risks and agree with anesthesia plan.  All questions answered.   ASA Score: 3    Ready For Surgery From Anesthesia Perspective.     .

## 2022-06-18 ENCOUNTER — NURSE TRIAGE (OUTPATIENT)
Dept: ADMINISTRATIVE | Facility: CLINIC | Age: 57
End: 2022-06-18
Payer: MEDICARE

## 2022-06-19 NOTE — TELEPHONE ENCOUNTER
Had a hernia surgery on yesterday and was discharged. Rx was called in to the pharmacy and the pharmacy would not release due to an allergy to codeine. States that he called earlier today to talk to someone and was told that he would have to call back on Monday. Dr Meraz contacted and advised on the Rx not being filled due to allergy. MD states no pain medication orders are given after hours. Family advised. Advised to ED for severe pain.    Reason for Disposition   [1] Caller has URGENT medicine question about med that PCP or specialist prescribed AND [2] triager unable to answer question    Protocols used: MEDICATION QUESTION CALL-A-

## 2022-06-20 VITALS
SYSTOLIC BLOOD PRESSURE: 144 MMHG | HEART RATE: 67 BPM | BODY MASS INDEX: 28.56 KG/M2 | WEIGHT: 182 LBS | RESPIRATION RATE: 16 BRPM | TEMPERATURE: 98 F | HEIGHT: 67 IN | DIASTOLIC BLOOD PRESSURE: 81 MMHG | OXYGEN SATURATION: 99 %

## 2022-06-20 RX ORDER — HYDROCODONE BITARTRATE AND ACETAMINOPHEN 5; 325 MG/1; MG/1
1 TABLET ORAL EVERY 6 HOURS PRN
Qty: 25 TABLET | Refills: 0 | Status: SHIPPED | OUTPATIENT
Start: 2022-06-20

## 2022-06-20 NOTE — PROGRESS NOTES
Pt states pain of 8-9/10 because adis Baca refusing to dispense hydrcodone to Pt.   Also not having bowel movement despite miralax and stool softener. Very uncomfortable because all he has is Advil taking every 3-4 hours.  Told  him to drink plenty of water and get up and walk to reduce abdominal pressure. Pt. To call MD office.

## 2022-06-28 ENCOUNTER — OFFICE VISIT (OUTPATIENT)
Dept: SURGERY | Facility: CLINIC | Age: 57
End: 2022-06-28
Payer: MEDICARE

## 2022-06-28 VITALS — HEART RATE: 84 BPM | DIASTOLIC BLOOD PRESSURE: 79 MMHG | TEMPERATURE: 98 F | SYSTOLIC BLOOD PRESSURE: 132 MMHG

## 2022-06-28 DIAGNOSIS — Z98.890 POST-OPERATIVE STATE: Primary | ICD-10-CM

## 2022-06-28 PROCEDURE — 99024 PR POST-OP FOLLOW-UP VISIT: ICD-10-PCS | Mod: S$GLB,POP,, | Performed by: PHYSICIAN ASSISTANT

## 2022-06-28 PROCEDURE — 99024 POSTOP FOLLOW-UP VISIT: CPT | Mod: S$GLB,POP,, | Performed by: PHYSICIAN ASSISTANT

## 2022-06-28 NOTE — PROGRESS NOTES
Erick BHUPENDRA Graves Jr. is status post robotic ventral hernia repair and presents today for follow-up care.  He has the following systemic symptoms or complaints: no complaints.     PE: Abdomen is soft, non-tender, non-distended.  Incisions clean, dry, and intact. No signs of infection.    Pathology: na     A/P:  Normal post-operative course.    The patient is instructed to avoid heavy lifting until 8 weeks after the surgery date.  Return to clinic as needed

## 2022-08-03 ENCOUNTER — OFFICE VISIT (OUTPATIENT)
Dept: PULMONOLOGY | Facility: CLINIC | Age: 57
End: 2022-08-03
Payer: MEDICARE

## 2022-08-03 VITALS
DIASTOLIC BLOOD PRESSURE: 82 MMHG | BODY MASS INDEX: 28.87 KG/M2 | HEART RATE: 92 BPM | WEIGHT: 184.31 LBS | SYSTOLIC BLOOD PRESSURE: 135 MMHG | OXYGEN SATURATION: 98 %

## 2022-08-03 DIAGNOSIS — F41.9 ANXIETY: ICD-10-CM

## 2022-08-03 DIAGNOSIS — J44.9 CHRONIC OBSTRUCTIVE PULMONARY DISEASE, UNSPECIFIED COPD TYPE: ICD-10-CM

## 2022-08-03 DIAGNOSIS — Z86.16 HISTORY OF COVID-19: ICD-10-CM

## 2022-08-03 DIAGNOSIS — Z87.891 HISTORY OF SMOKING: ICD-10-CM

## 2022-08-03 DIAGNOSIS — Z76.82 LUNG TRANSPLANT CANDIDATE: ICD-10-CM

## 2022-08-03 DIAGNOSIS — R06.02 SOB (SHORTNESS OF BREATH): ICD-10-CM

## 2022-08-03 PROCEDURE — 99214 PR OFFICE/OUTPT VISIT, EST, LEVL IV, 30-39 MIN: ICD-10-PCS | Mod: S$GLB,,, | Performed by: INTERNAL MEDICINE

## 2022-08-03 PROCEDURE — 99214 OFFICE O/P EST MOD 30 MIN: CPT | Mod: S$GLB,,, | Performed by: INTERNAL MEDICINE

## 2022-08-03 RX ORDER — ATENOLOL 25 MG/1
25 TABLET ORAL DAILY
Qty: 30 TABLET | Refills: 6 | Status: SHIPPED | OUTPATIENT
Start: 2022-08-03 | End: 2023-01-30

## 2022-08-03 RX ORDER — ASPIRIN/CALCIUM CARB/MAGNESIUM 325 MG
4 TABLET ORAL
Qty: 48 LOZENGE | Refills: 5 | Status: SHIPPED | OUTPATIENT
Start: 2022-08-03 | End: 2022-08-24 | Stop reason: SDUPTHER

## 2022-08-03 RX ORDER — BUDESONIDE, GLYCOPYRROLATE, AND FORMOTEROL FUMARATE 160; 9; 4.8 UG/1; UG/1; UG/1
2 AEROSOL, METERED RESPIRATORY (INHALATION) 2 TIMES DAILY
Qty: 10.7 G | Refills: 6 | Status: SHIPPED | OUTPATIENT
Start: 2022-08-03 | End: 2023-02-02 | Stop reason: SDUPTHER

## 2022-08-03 NOTE — PROGRESS NOTES
Office Visit Note *    Patient Name: Erick Graves Jr.  MRN: [id number]  : 1965      Reason for visit: COPD    HPI:     2021 - 55 yo male has been a pt of Dr Ibanez with COPD (stage IV C), + smoking (quit 2019 about 80 pack years) who is here to establish care.  He has been on BREZTRI and prn NATHAN and is doing well.  He does have issues with anxiety (we discussed this).  He does have a small periumbilical hernia.  Has seen Dr Rao for transplant evaluation in 2019.  Last CT scan that I can find is 2019 - nothing to be concerned about.  Waiting on records from Dr Ibanez's office.  Did reviewed some limited records from Dr Ibanez's office.      2021 - Here for follow up, Pt is currently stable on present medications with no recent increases in their symptoms or use of rescue medications.  Since our last visit there have been no hospitalizations or ER visits for their respiratory issues and there does not seem to be anything to suggest unrecognized exacerbations.  I have reviewed the medical regimen and re-educated the pt on the role of rescue and controlling medications.  Inhaler technique and understanding seems adequate.  The patient reports no issues with any of there medications for their COPD.  Refills will be taken care of as needed.  All questions answered.  Has a probable small periumbilical hernia that he wants evaluated.   Reports that he had Covid a few months ago but did not get very sick.    10/6/2021 - Here for follow up, Pt is currently stable on present medications with no recent increases in their symptoms or use of rescue medications.  Since our last visit there have been no hospitalizations or ER visits for their respiratory issues and there does not seem to be anything to suggest unrecognized exacerbations.  I have reviewed the medical regimen and re-educated the pt on the role of rescue and controlling medications.  Inhaler technique and understanding seems adequate.  The  patient reports no issues with any of there medications for their COPD.  Refills will be taken care of as needed.  All questions answered.    2/3/2022 - Here for follow, Pt is currently stable on present medications with no recent increases in their symptoms or use of rescue medications.  Since our last visit there have been no hospitalizations or ER visits for their respiratory issues and there does not seem to be anything to suggest unrecognized exacerbations.  I have reviewed the medical regimen and re-educated the pt on the role of rescue and controlling medications.  Inhaler technique and understanding seems adequate.  The patient reports no issues with any of there medications for their COPD.  Refills will be taken care of as needed.  All questions answered.  Patient has no known corona virus exposures and has been practicing social distancing.  We have discussed the virus and precautions and all questions have been answered.  He did have Covid during the first wave.  Has had 1 Covid vaccine but got very sick with it.    5/5/2022 - Here for follow up, Pt is currently stable on present medications with no recent increases in their symptoms or use of rescue medications.  Since our last visit there have been no hospitalizations or ER visits for their respiratory issues and there does not seem to be anything to suggest unrecognized exacerbations.  I have reviewed the medical regimen and re-educated the pt on the role of rescue and controlling medications.  Inhaler technique and understanding seems adequate.  The patient reports no issues with any of there medications for their COPD.  Refills will be taken care of as needed.  All questions answered.  Has had some increase in SOB, CARUSO and congestion but has not been taking BREZTRI regularly due to donut hole issues (we gave samples).  Patient has no known corona virus exposures and has been practicing social distancing.  We have discussed the virus and precautions  and all questions have been answered.  When he was smoking he smoked 1-1.5 PPD for about 35 years.    8/3/2022 - Here for follow up, Pt is currently stable on present medications with no recent increases in their symptoms or use of rescue medications.  Since our last visit there have been no hospitalizations or ER visits for their respiratory issues and there does not seem to be anything to suggest unrecognized exacerbations.  I have reviewed the medical regimen and re-educated the pt on the role of rescue and controlling medications.  Inhaler technique and understanding seems adequate.  The patient reports no issues with any of there medications for their COPD.  Refills will be taken care of as needed.  All questions answered.  Out of Breztri for a few days and some increased SOB.  Has had hernia surgery and did well.  Patient has no known corona virus exposures and has been practicing social distancing.  We have discussed the virus and precautions and all questions have been answered.      Low Dose Screening CT Chest    Cigarettes - 1-1.5 PPD x 35 YEARS  Still smoking -  NO    Date of last LD CT - 5/2022    Result - emphysema, o/w no concerning findings, needs repeat 5/2023    I have discussed with pt about using a screening CT of the chest due to history of cigarette smoking.  We have discussed the possible findings and possible actions as a result of these findings.  The pt would like to proceed.    Past Medical History    Past Medical History:   Diagnosis Date    COPD (chronic obstructive pulmonary disease)     Hepatitis     As a child turned jaundice    Hypertension     Peptic ulcer disease     Wears dentures     upper       Past Surgical History     Past Surgical History:   Procedure Laterality Date    CYST REMOVAL      BY TESTICAL    ROBOT-ASSISTED LAPAROSCOPIC REPAIR OF VENTRAL HERNIA N/A 6/17/2022    Procedure: ROBOTIC REPAIR, HERNIA, VENTRAL;  Surgeon: Phillip Chao III, MD;  Location: [facility]  OR;  Service: General;  Laterality: N/A;       Medications      Current Outpatient Medications:     ALPRAZolam (XANAX) 1 MG tablet, TAKE 1 TABLET BY MOUTH THREE TIMES A DAY AS NEEDED FOR ANXIETY, Disp: 90 tablet, Rfl: 3    aspirin (ECOTRIN) 81 MG EC tablet, Every day, Disp: , Rfl:     atenoloL (TENORMIN) 25 MG tablet, TAKE ONE TABLET BY MOUTH ONCE DAILY (Patient taking differently: every evening.), Disp: 30 tablet, Rfl: 6    HYDROcodone-acetaminophen (NORCO) 5-325 mg per tablet, Take 1 tablet by mouth every 6 (six) hours as needed for Pain., Disp: 25 tablet, Rfl: 0    ibuprofen (ADVIL,MOTRIN) 200 MG tablet, Take 200 mg by mouth every 6 (six) hours as needed for Pain., Disp: , Rfl:     budesonide-glycopyr-formoterol (BREZTRI AEROSPHERE) 160-9-4.8 mcg/actuation HFAA, Inhale 2 puffs into the lungs 2 (two) times daily., Disp: 10.7 g, Rfl: 6    nicotine polacrilex 4 MG Lozg, Take 1 lozenge (4 mg total) by mouth as needed (nicotine craving)., Disp: 48 lozenge, Rfl: 5    Allergies    Review of patient's allergies indicates:   Allergen Reactions    Codeine      Other reaction(s): Unknown patient states he never had an allergic reaction    Crestor [rosuvastatin] Other (See Comments)     Stomach soreness    Lipitor [atorvastatin] Other (See Comments)     Stomach soreness      Penicillins      Other reaction(s): Unknown       SocHx    Social History     Tobacco Use   Smoking Status Former Smoker    Packs/day: 0.50    Years: 34.00    Pack years: 17.00    Types: Cigarettes    Quit date: 2019    Years since quittin.9   Smokeless Tobacco Never Used   Tobacco Comment    [employer]       Social History     Substance and Sexual Activity   Alcohol Use Yes    Comment: occasional       Drug Use - no  Occupation -  work x 30 years - retired/disabled  Asbestos exposure - no  Pets - dog    FMHx    History reviewed. No pertinent family history.      Review of Systems  Review of Systems    Constitutional: Positive for malaise/fatigue. Negative for chills, diaphoresis, fever and weight loss.   HENT: Negative for congestion.    Eyes: Negative for pain.   Respiratory: Positive for cough (chronic) and shortness of breath. Negative for hemoptysis, sputum production, wheezing and stridor.    Cardiovascular: Negative for chest pain, palpitations, orthopnea, claudication, leg swelling and PND.   Gastrointestinal: Negative for abdominal pain, blood in stool, constipation, diarrhea, heartburn, melena, nausea and vomiting.   Genitourinary: Negative for dysuria, frequency, hematuria and urgency.   Musculoskeletal: Negative for falls and myalgias.   Neurological: Negative for dizziness, tingling, tremors, sensory change, speech change, focal weakness, seizures, loss of consciousness, weakness and headaches.   Psychiatric/Behavioral: Negative for depression, substance abuse and suicidal ideas. The patient is nervous/anxious.        Physical Exam    Vitals:    08/03/22 1110   BP: 135/82   BP Location: Left arm   Patient Position: Sitting   Pulse: 92   SpO2: 98%   Weight: 83.6 kg (184 lb 4.8 oz)       Physical Exam  Vitals and nursing note reviewed.   Constitutional:       General: He is not in acute distress.     Appearance: Normal appearance. He is well-developed. He is not ill-appearing, toxic-appearing or diaphoretic.   HENT:      Head: Normocephalic and atraumatic.      Right Ear: External ear normal.      Left Ear: External ear normal.      Nose: Nose normal.   Eyes:      General: No scleral icterus.        Right eye: No discharge.         Left eye: No discharge.      Extraocular Movements: Extraocular movements intact.      Conjunctiva/sclera: Conjunctivae normal.      Pupils: Pupils are equal, round, and reactive to light.   Neck:      Thyroid: No thyromegaly.      Vascular: No JVD.      Trachea: No tracheal deviation.   Cardiovascular:      Rate and Rhythm: Normal rate and regular rhythm.      Heart  sounds: Normal heart sounds. No murmur heard.    No friction rub. No gallop.   Pulmonary:      Effort: Pulmonary effort is normal. No respiratory distress.      Breath sounds: Normal breath sounds. No stridor. No wheezing, rhonchi or rales.      Comments: Decreased BS throughout  No acc m use  Chest:      Chest wall: No tenderness.   Abdominal:      General: Bowel sounds are normal. There is no distension.      Palpations: Abdomen is soft.      Comments: Small periumbilical hernia   Musculoskeletal:         General: No tenderness. Normal range of motion.      Cervical back: Normal range of motion and neck supple. No rigidity or tenderness.      Right lower leg: No edema.      Left lower leg: No edema.   Lymphadenopathy:      Cervical: No cervical adenopathy.   Skin:     General: Skin is warm and dry.      Capillary Refill: Capillary refill takes less than 2 seconds.   Neurological:      General: No focal deficit present.      Mental Status: He is alert and oriented to person, place, and time. Mental status is at baseline.      Cranial Nerves: No cranial nerve deficit.      Motor: No weakness.      Gait: Gait normal.      Deep Tendon Reflexes: Reflexes are normal and symmetric.   Psychiatric:         Mood and Affect: Mood normal.         Behavior: Behavior normal.         Thought Content: Thought content normal.         Judgment: Judgment normal.         Labs    Lab Results   Component Value Date    WBC 6.77 06/14/2022    HGB 14.6 06/14/2022    HCT 43.0 06/14/2022     06/14/2022       Sodium   Date Value Ref Range Status   06/14/2022 139 136 - 145 mmol/L Final     Potassium   Date Value Ref Range Status   06/14/2022 4.7 3.5 - 5.1 mmol/L Final     Chloride   Date Value Ref Range Status   06/14/2022 107 95 - 110 mmol/L Final     CO2   Date Value Ref Range Status   06/14/2022 22 (L) 23 - 29 mmol/L Final     Glucose   Date Value Ref Range Status   06/14/2022 111 (H) 70 - 110 mg/dL Final     BUN   Date Value Ref  Range Status   06/14/2022 12 6 - 20 mg/dL Final     Creatinine   Date Value Ref Range Status   06/14/2022 1.1 0.5 - 1.4 mg/dL Final     Calcium   Date Value Ref Range Status   06/14/2022 9.0 8.7 - 10.5 mg/dL Final     Total Protein   Date Value Ref Range Status   06/03/2022 7.8 6.0 - 8.4 g/dL Final     Albumin   Date Value Ref Range Status   06/03/2022 4.8 3.5 - 5.2 g/dL Final   07/23/2020 4.5 3.6 - 5.1 g/dL Final     Comment:     For additional information, please refer to   http://education.[employer]/faq/IGQ725 (This link is   being provided for informational/ educational purposes only.)  This test was developed and its analytical performance   characteristics have been determined by [facility]. It has not been cleared or approved by the   [place] Food and Drug Administration. This assay has been validated   pursuant to the CLIA regulations and is used for clinical   purposes.  @ Test Performed By:  [facility]  Jonathan Landers M.D., Ph.D.,   [address] [place] [id number]  [facility]  [id number]       Total Bilirubin   Date Value Ref Range Status   06/03/2022 0.9 0.1 - 1.0 mg/dL Final     Comment:     For infants and newborns, interpretation of results should be based  on gestational age, weight and in agreement with clinical  observations.    Premature Infant recommended reference ranges:  Up to 24 hours.............<8.0 mg/dL  Up to 48 hours............<12.0 mg/dL  3-5 days..................<15.0 mg/dL  6-29 days.................<15.0 mg/dL       Alkaline Phosphatase   Date Value Ref Range Status   06/03/2022 58 55 - 135 U/L Final     AST   Date Value Ref Range Status   06/03/2022 17 10 - 40 U/L Final     ALT   Date Value Ref Range Status   06/03/2022 21 10 - 44 U/L Final     Anion Gap   Date Value Ref Range Status   06/14/2022 10 8 - 16 mmol/L Final       Xrays    CMS MANDATED QUALITY DATA - CT RADIATION 436     All CT  scans at this facility utilize dose modulation, iterative reconstruction, and/or weight based dosing when appropriate to reduce radiation dose to as low as reasonably achievable.     CLINICAL HISTORY:  57 years (1965) Male screening baseline     TECHNIQUE:  CT LUNG SCREENING. 697 images obtained. CT scan of the chest was performed without intravenous contrast.     CONTRAST:  No IV contrast was administered     COMPARISON:  Most recent CT from September 12, 2019.     FINDINGS:.  Visualized neck: Within normal limits.  Lungs: Mild apical emphysematous lung disease. No concerning pulmonary nodule or mass is identified.  Airway: The trachea and central bronchial tree appear normal.  Pleura: There is no pleural effusion. There is no pneumothorax.  Cardiovascular: The heart is normal in size. There is no pericardial effusion. The thoracic aorta and great vessels are normal in course and caliber.  Mediastinum: There is no supraclavicular, axillary, mediastinal, or hilar lymphadenopathy.  Soft tissues: There is prominence of the breast tissue, possibly representing gynecomastia.  Musculoskeletal: The visualized osseous structures appear normal.  There are no suspicious osseous lesions.  Esophagus: The esophagus appears grossly normal.  Upper Abdomen: The visualized upper abdominal organs appear normal.     IMPRESSION:  No acute cardiac or pulmonary process.                 LUNG-RADS CATEGORY 1: NEGATIVE     RECOMMENDATION: Continued annual screening with LDCT in 12 months.           .     Electronically signed by:  Benton Deleon MD  5/16/2022 10:42 AM CDT Workstation: [id number]    Impression/Plan    Problem List Items Addressed This Visit        Psychiatric    Anxiety     · Will treat prn              Pulmonary    COPD (chronic obstructive pulmonary disease), severe      Continue present medications.   Will refill medications as needed.   Instructed patient to contact us with any issues concerning their  medications (cost, reactions, etc.).   Have discussed with patient about inciting conditions which may exacerbate their disease.   We did discuss possible new therapies or de-escalation of therapy (if appropriate).   Asked patient if they were interested in pursuing pulmonary rehabilitation.   All questions answered   RTC 3 months   Patient instructed that they are to call if symptoms change or new issues develop prior to their next visit.   Sample of BREZTRI and explained that he has to take regularly             SOB (shortness of breath), functional class IV     · At baseline           Lung transplant candidate     · Aware               ID    History of COVID-19     · Resolved               Other    History of smoking, quit 4/2013, 50 pack-years     · Aware                      Asa Morin MD

## 2022-08-24 ENCOUNTER — CLINICAL SUPPORT (OUTPATIENT)
Dept: SMOKING CESSATION | Facility: CLINIC | Age: 57
End: 2022-08-24

## 2022-08-24 DIAGNOSIS — F17.210 MODERATE SMOKER (20 OR LESS PER DAY): Primary | ICD-10-CM

## 2022-08-24 PROCEDURE — 99999 PR PBB SHADOW E&M-EST. PATIENT-LVL I: ICD-10-PCS | Mod: PBBFAC,,,

## 2022-08-24 PROCEDURE — 99404 PREV MED CNSL INDIV APPRX 60: CPT | Mod: S$GLB,,,

## 2022-08-24 PROCEDURE — 99404 PR PREVENT COUNSEL,INDIV,60 MIN: ICD-10-PCS | Mod: S$GLB,,,

## 2022-08-24 PROCEDURE — 99999 PR PBB SHADOW E&M-EST. PATIENT-LVL I: CPT | Mod: PBBFAC,,,

## 2022-08-24 RX ORDER — IBUPROFEN 200 MG
1 TABLET ORAL DAILY
Qty: 28 PATCH | Refills: 0 | Status: SHIPPED | OUTPATIENT
Start: 2022-08-24

## 2022-08-24 RX ORDER — DM/P-EPHED/ACETAMINOPH/DOXYLAM 30-7.5/3
2 LIQUID (ML) ORAL
Qty: 168 LOZENGE | Refills: 0 | Status: SHIPPED | OUTPATIENT
Start: 2022-08-24 | End: 2022-09-17 | Stop reason: SDUPTHER

## 2022-08-24 NOTE — Clinical Note
Patient was seen for tobacco cessation intake assessment. Patient is smoking 1ppd. Patient will begin on 21 mg nicotine patch and 2mg nicotine patch.  We discussed cues, triggers, self awarness and reduction/treatment plan.  Patient has agreed to weekly follow up.

## 2022-09-15 ENCOUNTER — CLINICAL SUPPORT (OUTPATIENT)
Dept: SMOKING CESSATION | Facility: CLINIC | Age: 57
End: 2022-09-15
Payer: COMMERCIAL

## 2022-09-15 DIAGNOSIS — F17.210 CIGARETTE NICOTINE DEPENDENCE, UNCOMPLICATED: Primary | ICD-10-CM

## 2022-09-15 DIAGNOSIS — F17.210 MODERATE SMOKER (20 OR LESS PER DAY): ICD-10-CM

## 2022-09-15 PROCEDURE — 99404 PR PREVENT COUNSEL,INDIV,60 MIN: ICD-10-PCS | Mod: S$PBB,,,

## 2022-09-15 PROCEDURE — 99404 PREV MED CNSL INDIV APPRX 60: CPT | Mod: S$PBB,,,

## 2022-09-15 PROCEDURE — 99999 PR PBB SHADOW E&M-EST. PATIENT-LVL I: ICD-10-PCS | Mod: PBBFAC,,,

## 2022-09-15 PROCEDURE — 99999 PR PBB SHADOW E&M-EST. PATIENT-LVL I: CPT | Mod: PBBFAC,,,

## 2022-09-15 NOTE — Clinical Note
Patient is tobacco free for the last week.  Patient was commended on his success thus far.  Patient did not start use of nicotine patches out of fear of skin irritation.  Patient began use of 2 mg nicotine lozenge 8 pieces per day without any negative side effects at this time.  Completion of TCRS (Tobacco Cessation Rating Scale) reviewed strategies, cues, and triggers. Introduced the negative impact of tobacco on health, the health advantages of discontinuing the use of tobacco, time line improved health changes after a quit, withdrawal issues to expect from nicotine and habit, and ways to achieve the goal of a quit. The patient denies any abnormal behavioral or mental changes at this time. The patient will continue with  therapy sessions and medication monitoring by CTTS. Prescribed medication management will be by physician

## 2022-09-17 RX ORDER — DM/P-EPHED/ACETAMINOPH/DOXYLAM 30-7.5/3
2 LIQUID (ML) ORAL
Qty: 168 LOZENGE | Refills: 0 | Status: SHIPPED | OUTPATIENT
Start: 2022-09-17 | End: 2022-10-20 | Stop reason: SDUPTHER

## 2022-09-17 NOTE — PROGRESS NOTES
Individual Follow-Up Form    9/17/2022    Quit Date:     Clinical Status of Patient: Outpatient    Continuing Medication: yes  Nicotine Lozenges    Other Medications: none     Target Symptoms: Withdrawal and medication side effects. The following were  rated moderate (3) to severe (4) on TCRS:  Moderate (3): none  Severe (4): none    Comments: Patient is tobacco free for the last week.  Patient was commended on his success thus far.  Patient did not start use of nicotine patches out of fear of skin irritation.  Patient began use of 2 mg nicotine lozenge 8 pieces per day without any negative side effects at this time.  Completion of TCRS (Tobacco Cessation Rating Scale) reviewed strategies, cues, and triggers. Introduced the negative impact of tobacco on health, the health advantages of discontinuing the use of tobacco, time line improved health changes after a quit, withdrawal issues to expect from nicotine and habit, and ways to achieve the goal of a quit. The patient denies any abnormal behavioral or mental changes at this time. The patient will continue with  therapy sessions and medication monitoring by CTTS. Prescribed medication management will be by physician.        Diagnosis: F17.210    Next Visit: 2 weeks

## 2022-10-05 ENCOUNTER — CLINICAL SUPPORT (OUTPATIENT)
Dept: SMOKING CESSATION | Facility: CLINIC | Age: 57
End: 2022-10-05
Payer: COMMERCIAL

## 2022-10-05 DIAGNOSIS — F17.210 LIGHT CIGARETTE SMOKER (1-9 CIGS/DAY): Primary | ICD-10-CM

## 2022-10-05 PROCEDURE — 99402 PR PREVENT COUNSEL,INDIV,30 MIN: ICD-10-PCS | Mod: S$GLB,,,

## 2022-10-05 PROCEDURE — 99999 PR PBB SHADOW E&M-EST. PATIENT-LVL I: ICD-10-PCS | Mod: PBBFAC,,,

## 2022-10-05 PROCEDURE — 99402 PREV MED CNSL INDIV APPRX 30: CPT | Mod: S$GLB,,,

## 2022-10-05 PROCEDURE — 99999 PR PBB SHADOW E&M-EST. PATIENT-LVL I: CPT | Mod: PBBFAC,,,

## 2022-10-06 NOTE — PROGRESS NOTES
Individual Follow-Up Form    10/5/22    Quit Date:     Clinical Status of Patient: Outpatient    Continuing Medication: yes  Nicotine Lozenges    Other Medications: none     Target Symptoms: Withdrawal and medication side effects. The following were  rated moderate (3) to severe (4) on TCRS:  Moderate (3): none  Severe (4): none    Comments: Telephoned patient for follow up after missed appointment.  Patient reports a lapse smoking 5 cpd.  We discussed and reviewed: triggers, managing stress & anxiety, support system, continued momentum, setting attainable goals, mindset & motivation, treatment plan and accountability.   Patient will attempt a quit and attempt use of nicotine patch.  The patient denies any abnormal behavioral or mental changes at this time. The patient will continue with  therapy sessions and medication monitoring by CTTS. Prescribed medication management will be by physician.      Diagnosis: F17.210    Next Visit: 2 weeks

## 2022-10-20 ENCOUNTER — CLINICAL SUPPORT (OUTPATIENT)
Dept: SMOKING CESSATION | Facility: CLINIC | Age: 57
End: 2022-10-20

## 2022-10-20 DIAGNOSIS — F17.210 LIGHT CIGARETTE SMOKER (1-9 CIGS/DAY): Primary | ICD-10-CM

## 2022-10-20 DIAGNOSIS — F17.210 MODERATE SMOKER (20 OR LESS PER DAY): ICD-10-CM

## 2022-10-20 PROCEDURE — 99407 BEHAV CHNG SMOKING > 10 MIN: CPT | Mod: S$GLB,,,

## 2022-10-20 PROCEDURE — 99999 PR PBB SHADOW E&M-EST. PATIENT-LVL I: CPT | Mod: PBBFAC,,,

## 2022-10-20 PROCEDURE — 99407 PR TOBACCO USE CESSATION INTENSIVE >10 MINUTES: ICD-10-PCS | Mod: S$GLB,,,

## 2022-10-20 PROCEDURE — 99999 PR PBB SHADOW E&M-EST. PATIENT-LVL I: ICD-10-PCS | Mod: PBBFAC,,,

## 2022-10-20 RX ORDER — DM/P-EPHED/ACETAMINOPH/DOXYLAM 30-7.5/3
2 LIQUID (ML) ORAL
Qty: 168 LOZENGE | Refills: 0 | Status: SHIPPED | OUTPATIENT
Start: 2022-10-20

## 2022-11-01 ENCOUNTER — OFFICE VISIT (OUTPATIENT)
Dept: PULMONOLOGY | Facility: CLINIC | Age: 57
End: 2022-11-01
Payer: MEDICARE

## 2022-11-01 VITALS
HEART RATE: 78 BPM | WEIGHT: 190.69 LBS | BODY MASS INDEX: 29.87 KG/M2 | OXYGEN SATURATION: 98 % | SYSTOLIC BLOOD PRESSURE: 118 MMHG | DIASTOLIC BLOOD PRESSURE: 72 MMHG

## 2022-11-01 DIAGNOSIS — E78.5 HYPERLIPIDEMIA, UNSPECIFIED HYPERLIPIDEMIA TYPE: Primary | ICD-10-CM

## 2022-11-01 DIAGNOSIS — Z87.891 HISTORY OF SMOKING: ICD-10-CM

## 2022-11-01 DIAGNOSIS — E34.9 HYPOTESTOSTERONISM: ICD-10-CM

## 2022-11-01 DIAGNOSIS — E55.9 VITAMIN D DEFICIENCY: ICD-10-CM

## 2022-11-01 DIAGNOSIS — Z86.16 HISTORY OF COVID-19: ICD-10-CM

## 2022-11-01 DIAGNOSIS — J44.9 CHRONIC OBSTRUCTIVE PULMONARY DISEASE, UNSPECIFIED COPD TYPE: ICD-10-CM

## 2022-11-01 PROCEDURE — 99214 PR OFFICE/OUTPT VISIT, EST, LEVL IV, 30-39 MIN: ICD-10-PCS | Mod: S$GLB,,, | Performed by: INTERNAL MEDICINE

## 2022-11-01 PROCEDURE — 99214 OFFICE O/P EST MOD 30 MIN: CPT | Mod: S$GLB,,, | Performed by: INTERNAL MEDICINE

## 2022-11-01 NOTE — PROGRESS NOTES
Office Visit Note *    Patient Name: Erick Graves Jr.  MRN: [id number]  : 1965      Reason for visit: COPD    HPI:     2021 - 57 yo male has been a pt of Dr Ibanez with COPD (stage IV C), + smoking (quit 2019 about 80 pack years) who is here to establish care.  He has been on BREZTRI and prn NATHAN and is doing well.  He does have issues with anxiety (we discussed this).  He does have a small periumbilical hernia.  Has seen Dr Rao for transplant evaluation in 2019.  Last CT scan that I can find is 2019 - nothing to be concerned about.  Waiting on records from Dr Ibanez's office.  Did reviewed some limited records from Dr Ibanez's office.      2021 - Here for follow up, Pt is currently stable on present medications with no recent increases in their symptoms or use of rescue medications.  Since our last visit there have been no hospitalizations or ER visits for their respiratory issues and there does not seem to be anything to suggest unrecognized exacerbations.  I have reviewed the medical regimen and re-educated the pt on the role of rescue and controlling medications.  Inhaler technique and understanding seems adequate.  The patient reports no issues with any of there medications for their COPD.  Refills will be taken care of as needed.  All questions answered.  Has a probable small periumbilical hernia that he wants evaluated.   Reports that he had Covid a few months ago but did not get very sick.    10/6/2021 - Here for follow up, Pt is currently stable on present medications with no recent increases in their symptoms or use of rescue medications.  Since our last visit there have been no hospitalizations or ER visits for their respiratory issues and there does not seem to be anything to suggest unrecognized exacerbations.  I have reviewed the medical regimen and re-educated the pt on the role of rescue and controlling medications.  Inhaler technique and understanding seems adequate.  The  patient reports no issues with any of there medications for their COPD.  Refills will be taken care of as needed.  All questions answered.    2/3/2022 - Here for follow, Pt is currently stable on present medications with no recent increases in their symptoms or use of rescue medications.  Since our last visit there have been no hospitalizations or ER visits for their respiratory issues and there does not seem to be anything to suggest unrecognized exacerbations.  I have reviewed the medical regimen and re-educated the pt on the role of rescue and controlling medications.  Inhaler technique and understanding seems adequate.  The patient reports no issues with any of there medications for their COPD.  Refills will be taken care of as needed.  All questions answered.  Patient has no known corona virus exposures and has been practicing social distancing.  We have discussed the virus and precautions and all questions have been answered.  He did have Covid during the first wave.  Has had 1 Covid vaccine but got very sick with it.    5/5/2022 - Here for follow up, Pt is currently stable on present medications with no recent increases in their symptoms or use of rescue medications.  Since our last visit there have been no hospitalizations or ER visits for their respiratory issues and there does not seem to be anything to suggest unrecognized exacerbations.  I have reviewed the medical regimen and re-educated the pt on the role of rescue and controlling medications.  Inhaler technique and understanding seems adequate.  The patient reports no issues with any of there medications for their COPD.  Refills will be taken care of as needed.  All questions answered.  Has had some increase in SOB, CARUSO and congestion but has not been taking BREZTRI regularly due to donut hole issues (we gave samples).  Patient has no known corona virus exposures and has been practicing social distancing.  We have discussed the virus and precautions  and all questions have been answered.  When he was smoking he smoked 1-1.5 PPD for about 35 years.    8/3/2022 - Here for follow up, Pt is currently stable on present medications with no recent increases in their symptoms or use of rescue medications.  Since our last visit there have been no hospitalizations or ER visits for their respiratory issues and there does not seem to be anything to suggest unrecognized exacerbations.  I have reviewed the medical regimen and re-educated the pt on the role of rescue and controlling medications.  Inhaler technique and understanding seems adequate.  The patient reports no issues with any of there medications for their COPD.  Refills will be taken care of as needed.  All questions answered.  Out of Breztri for a few days and some increased SOB.  Has had hernia surgery and did well.  Patient has no known corona virus exposures and has been practicing social distancing.  We have discussed the virus and precautions and all questions have been answered.    11/1/2022 - Here for follow up, Pt is currently stable on present medications with no recent increases in their symptoms or use of rescue medications.  Since our last visit there have been no hospitalizations or ER visits for their respiratory issues and there does not seem to be anything to suggest unrecognized exacerbations.  I have reviewed the medical regimen and re-educated the pt on the role of rescue and controlling medications.  Inhaler technique and understanding seems adequate.  The patient reports no issues with any of there medications for their COPD.  Refills will be taken care of as needed.  All questions answered.  He does feel that he may be more symptomatic with exertion.  He has gained a little weight as well. He is interested in getting back into exercise and wants a referral to pulmonary rehab.  Does not want to get the flu shot.    COPD Flowsheet    GOLD III D   Last PFT - 8/2021  FEV1- 37 % DLCO - 64 %        +ICS/LABA/LAMA    + prn NATHAN    mMRC -  0 - SOB with strenuous exercise   -  1 - SOB level ground, slight hill   -  2 - SOB walk slower or stop for breath level ground   - + 3 - SOB at 100 yards or after few minutes   -  4 - SOB in house, dressing    Referral to PULMONARY REHABILITATION - yes          Low Dose Screening CT Chest    Cigarettes - 1-1.5 PPD x 35 YEARS  Still smoking -  NO    Date of last LD CT - 5/2022    Result - emphysema, o/w no concerning findings, needs repeat 5/2023    I have discussed with pt about using a screening CT of the chest due to history of cigarette smoking.  We have discussed the possible findings and possible actions as a result of these findings.  The pt would like to proceed.    Past Medical History    Past Medical History:   Diagnosis Date    COPD (chronic obstructive pulmonary disease)     Hepatitis     As a child turned jaundice    Hypertension     Peptic ulcer disease     Wears dentures     upper       Past Surgical History     Past Surgical History:   Procedure Laterality Date    CYST REMOVAL      BY TESTICAL    ROBOT-ASSISTED LAPAROSCOPIC REPAIR OF VENTRAL HERNIA N/A 6/17/2022    Procedure: ROBOTIC REPAIR, HERNIA, VENTRAL;  Surgeon: Phillip Chao III, MD;  Location: [facility];  Service: General;  Laterality: N/A;       Medications      Current Outpatient Medications:     ALPRAZolam (XANAX) 1 MG tablet, TAKE 1 TABLET BY MOUTH THREE TIMES A DAY AS NEEDED FOR ANXIETY, Disp: 90 tablet, Rfl: 3    aspirin (ECOTRIN) 81 MG EC tablet, Every day, Disp: , Rfl:     atenoloL (TENORMIN) 25 MG tablet, Take 1 tablet (25 mg total) by mouth once daily., Disp: 30 tablet, Rfl: 6    budesonide-glycopyr-formoterol (BREZTRI AEROSPHERE) 160-9-4.8 mcg/actuation HFAA, Inhale 2 puffs into the lungs 2 (two) times daily., Disp: 10.7 g, Rfl: 6    HYDROcodone-acetaminophen (NORCO) 5-325 mg per tablet, Take 1 tablet by mouth every 6 (six) hours as needed for Pain., Disp: 25 tablet, Rfl: 0    ibuprofen  (ADVIL,MOTRIN) 200 MG tablet, Take 200 mg by mouth every 6 (six) hours as needed for Pain., Disp: , Rfl:     nicotine (NICODERM CQ) 21 mg/24 hr, Place 1 patch onto the skin once daily., Disp: 28 patch, Rfl: 0    nicotine polacrilex 2 MG Lozg, Take 1 lozenge (2 mg total) by mouth as needed (Use in place of cigarettes)., Disp: 168 lozenge, Rfl: 0    Allergies    Review of patient's allergies indicates:   Allergen Reactions    Codeine      Other reaction(s): Unknown patient states he never had an allergic reaction    Crestor [rosuvastatin] Other (See Comments)     Stomach soreness    Lipitor [atorvastatin] Other (See Comments)     Stomach soreness      Penicillins      Other reaction(s): Unknown       SocHx    Social History     Tobacco Use   Smoking Status Former    Packs/day: 0.50    Years: 34.00    Pack years: 17.00    Types: Cigarettes    Quit date: 2022    Years since quittin.1   Smokeless Tobacco Never   Tobacco Comments    OCC CIGARETTE       Social History     Substance and Sexual Activity   Alcohol Use Yes    Comment: occasional       Drug Use - no  Occupation -  work x 30 years - retired/disabled  Asbestos exposure - no  Pets - dog    FMHx    History reviewed. No pertinent family history.      Review of Systems  Review of Systems   Constitutional:  Positive for malaise/fatigue. Negative for chills, diaphoresis, fever and weight loss.   HENT:  Negative for congestion.    Eyes:  Negative for pain.   Respiratory:  Positive for cough (chronic) and shortness of breath. Negative for hemoptysis, sputum production, wheezing and stridor.    Cardiovascular:  Negative for chest pain, palpitations, orthopnea, claudication, leg swelling and PND.   Gastrointestinal:  Negative for abdominal pain, blood in stool, constipation, diarrhea, heartburn, melena, nausea and vomiting.   Genitourinary:  Negative for dysuria, frequency, hematuria and urgency.   Musculoskeletal:  Negative for falls and myalgias.    Neurological:  Negative for dizziness, tingling, tremors, sensory change, speech change, focal weakness, seizures, loss of consciousness, weakness and headaches.   Psychiatric/Behavioral:  Negative for depression, substance abuse and suicidal ideas. The patient is nervous/anxious.      Physical Exam    Vitals:    11/01/22 1014   BP: 118/72   BP Location: Left arm   Patient Position: Sitting   BP Method: Medium (Manual)   Pulse: 78   SpO2: 98%   Weight: 86.5 kg (190 lb 11.2 oz)       Physical Exam  Vitals and nursing note reviewed.   Constitutional:       General: He is not in acute distress.     Appearance: Normal appearance. He is well-developed. He is not ill-appearing, toxic-appearing or diaphoretic.   HENT:      Head: Normocephalic and atraumatic.      Right Ear: External ear normal.      Left Ear: External ear normal.      Nose: Nose normal.   Eyes:      General: No scleral icterus.        Right eye: No discharge.         Left eye: No discharge.      Extraocular Movements: Extraocular movements intact.      Conjunctiva/sclera: Conjunctivae normal.      Pupils: Pupils are equal, round, and reactive to light.   Neck:      Thyroid: No thyromegaly.      Vascular: No JVD.      Trachea: No tracheal deviation.   Cardiovascular:      Rate and Rhythm: Normal rate and regular rhythm.      Heart sounds: Normal heart sounds. No murmur heard.    No friction rub. No gallop.   Pulmonary:      Effort: Pulmonary effort is normal. No respiratory distress.      Breath sounds: Normal breath sounds. No stridor. No wheezing, rhonchi or rales.      Comments: Decreased BS throughout  No acc m use  Chest:      Chest wall: No tenderness.   Abdominal:      General: Bowel sounds are normal. There is no distension.      Palpations: Abdomen is soft.      Comments: Small periumbilical hernia   Musculoskeletal:         General: No tenderness. Normal range of motion.      Cervical back: Normal range of motion and neck supple. No rigidity or  tenderness.      Right lower leg: No edema.      Left lower leg: No edema.   Lymphadenopathy:      Cervical: No cervical adenopathy.   Skin:     General: Skin is warm and dry.      Capillary Refill: Capillary refill takes less than 2 seconds.   Neurological:      General: No focal deficit present.      Mental Status: He is alert and oriented to person, place, and time. Mental status is at baseline.      Cranial Nerves: No cranial nerve deficit.      Motor: No weakness.      Gait: Gait normal.      Deep Tendon Reflexes: Reflexes are normal and symmetric.   Psychiatric:         Mood and Affect: Mood normal.         Behavior: Behavior normal.         Thought Content: Thought content normal.         Judgment: Judgment normal.       Labs    Lab Results   Component Value Date    WBC 6.77 06/14/2022    HGB 14.6 06/14/2022    HCT 43.0 06/14/2022     06/14/2022       Sodium   Date Value Ref Range Status   06/14/2022 139 136 - 145 mmol/L Final     Potassium   Date Value Ref Range Status   06/14/2022 4.7 3.5 - 5.1 mmol/L Final     Chloride   Date Value Ref Range Status   06/14/2022 107 95 - 110 mmol/L Final     CO2   Date Value Ref Range Status   06/14/2022 22 (L) 23 - 29 mmol/L Final     Glucose   Date Value Ref Range Status   06/14/2022 111 (H) 70 - 110 mg/dL Final     BUN   Date Value Ref Range Status   06/14/2022 12 6 - 20 mg/dL Final     Creatinine   Date Value Ref Range Status   06/14/2022 1.1 0.5 - 1.4 mg/dL Final     Calcium   Date Value Ref Range Status   06/14/2022 9.0 8.7 - 10.5 mg/dL Final     Total Protein   Date Value Ref Range Status   06/03/2022 7.8 6.0 - 8.4 g/dL Final     Albumin   Date Value Ref Range Status   06/03/2022 4.8 3.5 - 5.2 g/dL Final   07/23/2020 4.5 3.6 - 5.1 g/dL Final     Comment:     For additional information, please refer to   http://education.[employer]/faq/PRF680 (This link is   being provided for informational/ educational purposes only.)  This test was developed and its  analytical performance   characteristics have been determined by [facility]. It has not been cleared or approved by the   [place] Food and Drug Administration. This assay has been validated   pursuant to the CLIA regulations and is used for clinical   purposes.  @ Test Performed By:  [facility]  Jonathan Landers M.D., Ph.D.,   [address] [place] [id number]  CLIA  [id number]       Total Bilirubin   Date Value Ref Range Status   06/03/2022 0.9 0.1 - 1.0 mg/dL Final     Comment:     For infants and newborns, interpretation of results should be based  on gestational age, weight and in agreement with clinical  observations.    Premature Infant recommended reference ranges:  Up to 24 hours.............<8.0 mg/dL  Up to 48 hours............<12.0 mg/dL  3-5 days..................<15.0 mg/dL  6-29 days.................<15.0 mg/dL       Alkaline Phosphatase   Date Value Ref Range Status   06/03/2022 58 55 - 135 U/L Final     AST   Date Value Ref Range Status   06/03/2022 17 10 - 40 U/L Final     ALT   Date Value Ref Range Status   06/03/2022 21 10 - 44 U/L Final     Anion Gap   Date Value Ref Range Status   06/14/2022 10 8 - 16 mmol/L Final       Xrays    CMS MANDATED QUALITY DATA - CT RADIATION 436     All CT scans at this facility utilize dose modulation, iterative reconstruction, and/or weight based dosing when appropriate to reduce radiation dose to as low as reasonably achievable.     CLINICAL HISTORY:  57 years (1965) Male screening baseline     TECHNIQUE:  CT LUNG SCREENING. 697 images obtained. CT scan of the chest was performed without intravenous contrast.     CONTRAST:  No IV contrast was administered     COMPARISON:  Most recent CT from September 12, 2019.     FINDINGS:.  Visualized neck: Within normal limits.  Lungs: Mild apical emphysematous lung disease. No concerning pulmonary nodule or mass is identified.  Airway: The  trachea and central bronchial tree appear normal.  Pleura: There is no pleural effusion. There is no pneumothorax.  Cardiovascular: The heart is normal in size. There is no pericardial effusion. The thoracic aorta and great vessels are normal in course and caliber.  Mediastinum: There is no supraclavicular, axillary, mediastinal, or hilar lymphadenopathy.  Soft tissues: There is prominence of the breast tissue, possibly representing gynecomastia.  Musculoskeletal: The visualized osseous structures appear normal.  There are no suspicious osseous lesions.  Esophagus: The esophagus appears grossly normal.  Upper Abdomen: The visualized upper abdominal organs appear normal.     IMPRESSION:  No acute cardiac or pulmonary process.                 LUNG-RADS CATEGORY 1: NEGATIVE     RECOMMENDATION: Continued annual screening with LDCT in 12 months.           .     Electronically signed by:  Benton Deleon MD  5/16/2022 10:42 AM CDT Workstation: [id number]    Impression/Plan    Problem List Items Addressed This Visit          Pulmonary    COPD (chronic obstructive pulmonary disease), severe     Continue present medications.  Will refill medications as needed.  Instructed patient to contact us with any issues concerning their medications (cost, reactions, etc.).  Have discussed with patient about inciting conditions which may exacerbate their disease.  We did discuss possible new therapies or de-escalation of therapy (if appropriate).  Asked patient if they were interested in pursuing pulmonary rehabilitation.  All questions answered  RTC 3 months  Patient instructed that they are to call if symptoms change or new issues develop prior to their next visit.           Relevant Orders    Ambulatory referral/consult to Pulmonary Rehab       ID    History of COVID-19     Aware             Other    History of smoking, quit 4/2013, 50 pack-years     Aware                 Asa Morin MD

## 2022-11-02 ENCOUNTER — LAB VISIT (OUTPATIENT)
Dept: LAB | Facility: HOSPITAL | Age: 57
End: 2022-11-02
Attending: INTERNAL MEDICINE
Payer: MEDICARE

## 2022-11-02 DIAGNOSIS — J44.9 CHRONIC OBSTRUCTIVE PULMONARY DISEASE, UNSPECIFIED COPD TYPE: ICD-10-CM

## 2022-11-02 DIAGNOSIS — E55.9 VITAMIN D DEFICIENCY: ICD-10-CM

## 2022-11-02 DIAGNOSIS — E78.5 HYPERLIPIDEMIA, UNSPECIFIED HYPERLIPIDEMIA TYPE: ICD-10-CM

## 2022-11-02 DIAGNOSIS — E34.9 HYPOTESTOSTERONISM: ICD-10-CM

## 2022-11-02 LAB
25(OH)D3+25(OH)D2 SERPL-MCNC: 29 NG/ML (ref 30–96)
CHOLEST SERPL-MCNC: 220 MG/DL (ref 120–199)
CHOLEST/HDLC SERPL: 4.6 {RATIO} (ref 2–5)
HDLC SERPL-MCNC: 48 MG/DL (ref 40–75)
HDLC SERPL: 21.8 % (ref 20–50)
LDLC SERPL CALC-MCNC: 156.6 MG/DL (ref 63–159)
NONHDLC SERPL-MCNC: 172 MG/DL
TRIGL SERPL-MCNC: 77 MG/DL (ref 30–150)

## 2022-11-02 PROCEDURE — 82040 ASSAY OF SERUM ALBUMIN: CPT | Performed by: INTERNAL MEDICINE

## 2022-11-02 PROCEDURE — 36415 COLL VENOUS BLD VENIPUNCTURE: CPT | Performed by: INTERNAL MEDICINE

## 2022-11-02 PROCEDURE — 82306 VITAMIN D 25 HYDROXY: CPT | Performed by: INTERNAL MEDICINE

## 2022-11-02 PROCEDURE — 80061 LIPID PANEL: CPT | Performed by: INTERNAL MEDICINE

## 2022-11-03 LAB
ALBUMIN SERPL-MCNC: 5 G/DL (ref 3.8–4.9)
SHBG SERPL-SCNC: 31.4 NMOL/L (ref 19.3–76.4)
TESTOST FREE SERPL-MCNC: 87.5 PG/ML (ref 35.8–168.2)
TESTOST SERPL-MCNC: 448 NG/DL (ref 264–916)

## 2022-12-12 ENCOUNTER — CLINICAL SUPPORT (OUTPATIENT)
Dept: SMOKING CESSATION | Facility: CLINIC | Age: 57
End: 2022-12-12

## 2022-12-12 DIAGNOSIS — F17.200 NICOTINE DEPENDENCE: Primary | ICD-10-CM

## 2022-12-12 PROCEDURE — 99407 BEHAV CHNG SMOKING > 10 MIN: CPT | Mod: S$GLB,,,

## 2022-12-12 PROCEDURE — 99407 PR TOBACCO USE CESSATION INTENSIVE >10 MINUTES: ICD-10-PCS | Mod: S$GLB,,,

## 2022-12-12 NOTE — PROGRESS NOTES
Spoke with patient today in regard to smoking cessation progress for 3 month telephone follow up, he states tobacco free. Commended patient on the accomplishment. Informed patient of benefit period and contact information if any further help or support is needed. Will continue to follow patient for Quit attempt #1 at six and twelve months.

## 2022-12-16 ENCOUNTER — TELEPHONE (OUTPATIENT)
Dept: SURGERY | Facility: CLINIC | Age: 57
End: 2022-12-16
Payer: MEDICARE
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
## 2023-01-10 ENCOUNTER — HOSPITAL ENCOUNTER (EMERGENCY)
Facility: HOSPITAL | Age: 58
Discharge: HOME | End: 2023-01-10
Attending: EMERGENCY MEDICINE
Payer: MEDICARE

## 2023-01-10 VITALS
RESPIRATION RATE: 18 BRPM | BODY MASS INDEX: 29.35 KG/M2 | TEMPERATURE: 99 F | HEIGHT: 67 IN | OXYGEN SATURATION: 97 % | SYSTOLIC BLOOD PRESSURE: 149 MMHG | WEIGHT: 187 LBS | DIASTOLIC BLOOD PRESSURE: 98 MMHG | HEART RATE: 69 BPM

## 2023-01-10 DIAGNOSIS — M70.61 TROCHANTERIC BURSITIS OF RIGHT HIP: ICD-10-CM

## 2023-01-10 DIAGNOSIS — M25.50 ARTHRALGIA, UNSPECIFIED JOINT: Primary | ICD-10-CM

## 2023-01-10 DIAGNOSIS — R52 PAIN: ICD-10-CM

## 2023-01-10 PROCEDURE — 99284 EMERGENCY DEPT VISIT MOD MDM: CPT

## 2023-01-10 RX ORDER — MELOXICAM 15 MG/1
15 TABLET ORAL DAILY
Qty: 14 TABLET | Refills: 0 | Status: SHIPPED | OUTPATIENT
Start: 2023-01-10
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
## 2023-02-02 ENCOUNTER — OFFICE VISIT (OUTPATIENT)
Dept: PULMONOLOGY | Facility: CLINIC | Age: 58
End: 2023-02-02
Payer: MEDICARE

## 2023-02-02 VITALS
HEART RATE: 65 BPM | SYSTOLIC BLOOD PRESSURE: 132 MMHG | WEIGHT: 196 LBS | OXYGEN SATURATION: 97 % | DIASTOLIC BLOOD PRESSURE: 78 MMHG | BODY MASS INDEX: 30.7 KG/M2

## 2023-02-02 DIAGNOSIS — Z87.891 HISTORY OF SMOKING: ICD-10-CM

## 2023-02-02 DIAGNOSIS — J44.9 CHRONIC OBSTRUCTIVE PULMONARY DISEASE, UNSPECIFIED COPD TYPE: ICD-10-CM

## 2023-02-02 DIAGNOSIS — Z86.16 HISTORY OF COVID-19: ICD-10-CM

## 2023-02-02 DIAGNOSIS — Z76.82 LUNG TRANSPLANT CANDIDATE: ICD-10-CM

## 2023-02-02 DIAGNOSIS — R06.02 SOB (SHORTNESS OF BREATH): ICD-10-CM

## 2023-02-02 PROCEDURE — 3075F PR MOST RECENT SYSTOLIC BLOOD PRESS GE 130-139MM HG: ICD-10-PCS | Mod: CPTII,S$GLB,, | Performed by: INTERNAL MEDICINE

## 2023-02-02 PROCEDURE — 1159F PR MEDICATION LIST DOCUMENTED IN MEDICAL RECORD: ICD-10-PCS | Mod: CPTII,S$GLB,, | Performed by: INTERNAL MEDICINE

## 2023-02-02 PROCEDURE — 99214 OFFICE O/P EST MOD 30 MIN: CPT | Mod: S$GLB,,, | Performed by: INTERNAL MEDICINE

## 2023-02-02 PROCEDURE — 3075F SYST BP GE 130 - 139MM HG: CPT | Mod: CPTII,S$GLB,, | Performed by: INTERNAL MEDICINE

## 2023-02-02 PROCEDURE — 99214 PR OFFICE/OUTPT VISIT, EST, LEVL IV, 30-39 MIN: ICD-10-PCS | Mod: S$GLB,,, | Performed by: INTERNAL MEDICINE

## 2023-02-02 PROCEDURE — 1160F RVW MEDS BY RX/DR IN RCRD: CPT | Mod: CPTII,S$GLB,, | Performed by: INTERNAL MEDICINE

## 2023-02-02 PROCEDURE — 3078F DIAST BP <80 MM HG: CPT | Mod: CPTII,S$GLB,, | Performed by: INTERNAL MEDICINE

## 2023-02-02 PROCEDURE — 3008F PR BODY MASS INDEX (BMI) DOCUMENTED: ICD-10-PCS | Mod: CPTII,S$GLB,, | Performed by: INTERNAL MEDICINE

## 2023-02-02 PROCEDURE — 3008F BODY MASS INDEX DOCD: CPT | Mod: CPTII,S$GLB,, | Performed by: INTERNAL MEDICINE

## 2023-02-02 PROCEDURE — 3078F PR MOST RECENT DIASTOLIC BLOOD PRESSURE < 80 MM HG: ICD-10-PCS | Mod: CPTII,S$GLB,, | Performed by: INTERNAL MEDICINE

## 2023-02-02 PROCEDURE — 1159F MED LIST DOCD IN RCRD: CPT | Mod: CPTII,S$GLB,, | Performed by: INTERNAL MEDICINE

## 2023-02-02 PROCEDURE — 1160F PR REVIEW ALL MEDS BY PRESCRIBER/CLIN PHARMACIST DOCUMENTED: ICD-10-PCS | Mod: CPTII,S$GLB,, | Performed by: INTERNAL MEDICINE

## 2023-02-02 RX ORDER — ALBUTEROL SULFATE 90 UG/1
2 AEROSOL, METERED RESPIRATORY (INHALATION) EVERY 6 HOURS PRN
Qty: 18 G | Refills: 11 | Status: SHIPPED | OUTPATIENT
Start: 2023-02-02 | End: 2023-12-04 | Stop reason: SDUPTHER

## 2023-02-02 RX ORDER — BUDESONIDE, GLYCOPYRROLATE, AND FORMOTEROL FUMARATE 160; 9; 4.8 UG/1; UG/1; UG/1
2 AEROSOL, METERED RESPIRATORY (INHALATION) 2 TIMES DAILY
Qty: 10.7 G | Refills: 6 | Status: SHIPPED | OUTPATIENT
Start: 2023-02-02 | End: 2023-08-14

## 2023-02-02 NOTE — PROGRESS NOTES
Office Visit Note *    Patient Name: Erick Graves Jr.  MRN: [id number]  : 1965      Reason for visit: COPD    HPI:     2021 - 55 yo male has been a pt of Dr Ibanez with COPD (stage IV C), + smoking (quit 2019 about 80 pack years) who is here to establish care.  He has been on BREZTRI and prn NATHAN and is doing well.  He does have issues with anxiety (we discussed this).  He does have a small periumbilical hernia.  Has seen Dr Rao for transplant evaluation in 2019.  Last CT scan that I can find is 2019 - nothing to be concerned about.  Waiting on records from Dr Ibanez's office.  Did reviewed some limited records from Dr Ibanez's office.      2021 - Here for follow up, Pt is currently stable on present medications with no recent increases in their symptoms or use of rescue medications.  Since our last visit there have been no hospitalizations or ER visits for their respiratory issues and there does not seem to be anything to suggest unrecognized exacerbations.  I have reviewed the medical regimen and re-educated the pt on the role of rescue and controlling medications.  Inhaler technique and understanding seems adequate.  The patient reports no issues with any of there medications for their COPD.  Refills will be taken care of as needed.  All questions answered.  Has a probable small periumbilical hernia that he wants evaluated.   Reports that he had Covid a few months ago but did not get very sick.    10/6/2021 - Here for follow up, Pt is currently stable on present medications with no recent increases in their symptoms or use of rescue medications.  Since our last visit there have been no hospitalizations or ER visits for their respiratory issues and there does not seem to be anything to suggest unrecognized exacerbations.  I have reviewed the medical regimen and re-educated the pt on the role of rescue and controlling medications.  Inhaler technique and understanding seems adequate.  The  patient reports no issues with any of there medications for their COPD.  Refills will be taken care of as needed.  All questions answered.    2/3/2022 - Here for follow, Pt is currently stable on present medications with no recent increases in their symptoms or use of rescue medications.  Since our last visit there have been no hospitalizations or ER visits for their respiratory issues and there does not seem to be anything to suggest unrecognized exacerbations.  I have reviewed the medical regimen and re-educated the pt on the role of rescue and controlling medications.  Inhaler technique and understanding seems adequate.  The patient reports no issues with any of there medications for their COPD.  Refills will be taken care of as needed.  All questions answered.  Patient has no known corona virus exposures and has been practicing social distancing.  We have discussed the virus and precautions and all questions have been answered.  He did have Covid during the first wave.  Has had 1 Covid vaccine but got very sick with it.    5/5/2022 - Here for follow up, Pt is currently stable on present medications with no recent increases in their symptoms or use of rescue medications.  Since our last visit there have been no hospitalizations or ER visits for their respiratory issues and there does not seem to be anything to suggest unrecognized exacerbations.  I have reviewed the medical regimen and re-educated the pt on the role of rescue and controlling medications.  Inhaler technique and understanding seems adequate.  The patient reports no issues with any of there medications for their COPD.  Refills will be taken care of as needed.  All questions answered.  Has had some increase in SOB, CARUSO and congestion but has not been taking BREZTRI regularly due to donut hole issues (we gave samples).  Patient has no known corona virus exposures and has been practicing social distancing.  We have discussed the virus and precautions  and all questions have been answered.  When he was smoking he smoked 1-1.5 PPD for about 35 years.    8/3/2022 - Here for follow up, Pt is currently stable on present medications with no recent increases in their symptoms or use of rescue medications.  Since our last visit there have been no hospitalizations or ER visits for their respiratory issues and there does not seem to be anything to suggest unrecognized exacerbations.  I have reviewed the medical regimen and re-educated the pt on the role of rescue and controlling medications.  Inhaler technique and understanding seems adequate.  The patient reports no issues with any of there medications for their COPD.  Refills will be taken care of as needed.  All questions answered.  Out of Breztri for a few days and some increased SOB.  Has had hernia surgery and did well.  Patient has no known corona virus exposures and has been practicing social distancing.  We have discussed the virus and precautions and all questions have been answered.    11/1/2022 - Here for follow up, Pt is currently stable on present medications with no recent increases in their symptoms or use of rescue medications.  Since our last visit there have been no hospitalizations or ER visits for their respiratory issues and there does not seem to be anything to suggest unrecognized exacerbations.  I have reviewed the medical regimen and re-educated the pt on the role of rescue and controlling medications.  Inhaler technique and understanding seems adequate.  The patient reports no issues with any of there medications for their COPD.  Refills will be taken care of as needed.  All questions answered.  He does feel that he may be more symptomatic with exertion.  He has gained a little weight as well. He is interested in getting back into exercise and wants a referral to pulmonary rehab.  Does not want to get the flu shot.    2/2/2023 - Here for follow up, he has run out of inhalers and is having trouble  affording any and unfortunately due to administrative decisions we do not have any samples.  He has noted some increased dyspnea since running out of meds.  Still not smoking and using nicotine lozenges.      COPD Flowsheet    GOLD III D   Last PFT - 8/2021  FEV1- 37 % DLCO - 64 %       +ICS/LABA/LAMA    + prn NAHTAN    mMRC -  0 - SOB with strenuous exercise   -  1 - SOB level ground, slight hill   -  2 - SOB walk slower or stop for breath level ground   - + 3 - SOB at 100 yards or after few minutes   -  4 - SOB in house, dressing    Referral to PULMONARY REHABILITATION - yes          Low Dose Screening CT Chest    Cigarettes - 1-1.5 PPD x 35 YEARS  Still smoking -  NO    Date of last LD CT - 5/2022    Result - emphysema, o/w no concerning findings, needs repeat 5/2023    I have discussed with pt about using a screening CT of the chest due to history of cigarette smoking.  We have discussed the possible findings and possible actions as a result of these findings.  The pt would like to proceed.    Past Medical History    Past Medical History:   Diagnosis Date    COPD (chronic obstructive pulmonary disease)     Hepatitis     As a child turned jaundice    Hypertension     Peptic ulcer disease     Wears dentures     upper       Past Surgical History     Past Surgical History:   Procedure Laterality Date    CYST REMOVAL      BY TESTICAL    ROBOT-ASSISTED LAPAROSCOPIC REPAIR OF VENTRAL HERNIA N/A 6/17/2022    Procedure: ROBOTIC REPAIR, HERNIA, VENTRAL;  Surgeon: Phillip Chao III, MD;  Location: [facility];  Service: General;  Laterality: N/A;       Medications      Current Outpatient Medications:     ALPRAZolam (XANAX) 1 MG tablet, TAKE 1 TABLET BY MOUTH THREE TIMES A DAY AS NEEDED FOR ANXIETY, Disp: 90 tablet, Rfl: 3    aspirin (ECOTRIN) 81 MG EC tablet, Every day, Disp: , Rfl:     atenoloL (TENORMIN) 25 MG tablet, TAKE 1 TABLET BY MOUTH ONCE A DAY, Disp: 30 tablet, Rfl: 11    nicotine (NICODERM CQ) 21 mg/24 hr, Place  1 patch onto the skin once daily., Disp: 28 patch, Rfl: 0    nicotine polacrilex 2 MG Lozg, Take 1 lozenge (2 mg total) by mouth as needed (Use in place of cigarettes)., Disp: 168 lozenge, Rfl: 0    albuterol (PROVENTIL/VENTOLIN HFA) 90 mcg/actuation inhaler, Inhale 2 puffs into the lungs every 6 (six) hours as needed for Shortness of Breath. Rescue, Disp: 18 g, Rfl: 11    budesonide-glycopyr-formoterol (BREZTRI AEROSPHERE) 160-9-4.8 mcg/actuation HFAA, Inhale 2 puffs into the lungs 2 (two) times daily., Disp: 10.7 g, Rfl: 6    HYDROcodone-acetaminophen (NORCO) 5-325 mg per tablet, Take 1 tablet by mouth every 6 (six) hours as needed for Pain. (Patient not taking: Reported on 2023), Disp: 25 tablet, Rfl: 0    meloxicam (MOBIC) 15 MG tablet, Take 1 tablet (15 mg total) by mouth once daily. (Patient not taking: Reported on 2023), Disp: 14 tablet, Rfl: 0    Allergies    Review of patient's allergies indicates:   Allergen Reactions    Codeine      Other reaction(s): Unknown patient states he never had an allergic reaction    Crestor [rosuvastatin] Other (See Comments)     Stomach soreness    Lipitor [atorvastatin] Other (See Comments)     Stomach soreness      Penicillins      Other reaction(s): Unknown       SocHx    Social History     Tobacco Use   Smoking Status Former    Packs/day: 0.50    Years: 34.00    Pack years: 17.00    Types: Cigarettes    Quit date: 2022    Years since quittin.4   Smokeless Tobacco Never   Tobacco Comments    OCC CIGARETTE       Social History     Substance and Sexual Activity   Alcohol Use Yes    Comment: occasional       Drug Use - no  Occupation -  work x 30 years - retired/disabled  Asbestos exposure - no  Pets - dog    FMHx    History reviewed. No pertinent family history.      Review of Systems  Review of Systems   Constitutional:  Positive for malaise/fatigue. Negative for chills, diaphoresis, fever and weight loss.   HENT:  Negative for congestion.     Eyes:  Negative for pain.   Respiratory:  Positive for cough (chronic) and shortness of breath. Negative for hemoptysis, sputum production, wheezing and stridor.    Cardiovascular:  Negative for chest pain, palpitations, orthopnea, claudication, leg swelling and PND.   Gastrointestinal:  Negative for abdominal pain, blood in stool, constipation, diarrhea, heartburn, melena, nausea and vomiting.   Genitourinary:  Negative for dysuria, frequency, hematuria and urgency.   Musculoskeletal:  Negative for falls and myalgias.   Neurological:  Negative for dizziness, tingling, tremors, sensory change, speech change, focal weakness, seizures, loss of consciousness, weakness and headaches.   Psychiatric/Behavioral:  Negative for depression, substance abuse and suicidal ideas. The patient is nervous/anxious.      Physical Exam    Vitals:    02/02/23 1018   BP: 132/78   BP Location: Left arm   Patient Position: Sitting   BP Method: Medium (Manual)   Pulse: 65   SpO2: 97%   Weight: 88.9 kg (196 lb)       Physical Exam  Vitals and nursing note reviewed.   Constitutional:       General: He is not in acute distress.     Appearance: Normal appearance. He is well-developed. He is not ill-appearing, toxic-appearing or diaphoretic.   HENT:      Head: Normocephalic and atraumatic.      Right Ear: External ear normal.      Left Ear: External ear normal.      Nose: Nose normal.   Eyes:      General: No scleral icterus.        Right eye: No discharge.         Left eye: No discharge.      Extraocular Movements: Extraocular movements intact.      Conjunctiva/sclera: Conjunctivae normal.      Pupils: Pupils are equal, round, and reactive to light.   Neck:      Thyroid: No thyromegaly.      Vascular: No JVD.      Trachea: No tracheal deviation.   Cardiovascular:      Rate and Rhythm: Normal rate and regular rhythm.      Heart sounds: Normal heart sounds. No murmur heard.    No friction rub. No gallop.   Pulmonary:      Effort: Pulmonary  effort is normal. No respiratory distress.      Breath sounds: Normal breath sounds. No stridor. No wheezing, rhonchi or rales.      Comments: Decreased BS throughout  No acc m use  Chest:      Chest wall: No tenderness.   Abdominal:      General: Bowel sounds are normal. There is no distension.      Palpations: Abdomen is soft.      Comments: Small periumbilical hernia   Musculoskeletal:         General: No tenderness. Normal range of motion.      Cervical back: Normal range of motion and neck supple. No rigidity or tenderness.      Right lower leg: No edema.      Left lower leg: No edema.   Lymphadenopathy:      Cervical: No cervical adenopathy.   Skin:     General: Skin is warm and dry.      Capillary Refill: Capillary refill takes less than 2 seconds.   Neurological:      General: No focal deficit present.      Mental Status: He is alert and oriented to person, place, and time. Mental status is at baseline.      Cranial Nerves: No cranial nerve deficit.      Motor: No weakness.      Gait: Gait normal.      Deep Tendon Reflexes: Reflexes are normal and symmetric.   Psychiatric:         Mood and Affect: Mood normal.         Behavior: Behavior normal.         Thought Content: Thought content normal.         Judgment: Judgment normal.       Labs    Lab Results   Component Value Date    WBC 6.77 06/14/2022    HGB 14.6 06/14/2022    HCT 43.0 06/14/2022     06/14/2022       Sodium   Date Value Ref Range Status   06/14/2022 139 136 - 145 mmol/L Final     Potassium   Date Value Ref Range Status   06/14/2022 4.7 3.5 - 5.1 mmol/L Final     Chloride   Date Value Ref Range Status   06/14/2022 107 95 - 110 mmol/L Final     CO2   Date Value Ref Range Status   06/14/2022 22 (L) 23 - 29 mmol/L Final     Glucose   Date Value Ref Range Status   06/14/2022 111 (H) 70 - 110 mg/dL Final     BUN   Date Value Ref Range Status   06/14/2022 12 6 - 20 mg/dL Final     Creatinine   Date Value Ref Range Status   06/14/2022 1.1 0.5 -  1.4 mg/dL Final     Calcium   Date Value Ref Range Status   06/14/2022 9.0 8.7 - 10.5 mg/dL Final     Total Protein   Date Value Ref Range Status   06/03/2022 7.8 6.0 - 8.4 g/dL Final     Albumin   Date Value Ref Range Status   11/02/2022 5.0 (H) 3.8 - 4.9 g/dL Final   07/23/2020 4.5 3.6 - 5.1 g/dL Final     Comment:     For additional information, please refer to   http://education.[employer]/faq/BNA876 (This link is   being provided for informational/ educational purposes only.)  This test was developed and its analytical performance   characteristics have been determined by [facility]. It has not been cleared or approved by the   [place] Food and Drug Administration. This assay has been validated   pursuant to the CLIA regulations and is used for clinical   purposes.  @ Test Performed By:  [facility]  Jonathan Landers M.D., Ph.D.,   [address] [place] [id number]  [facility]  [id number]       Total Bilirubin   Date Value Ref Range Status   06/03/2022 0.9 0.1 - 1.0 mg/dL Final     Comment:     For infants and newborns, interpretation of results should be based  on gestational age, weight and in agreement with clinical  observations.    Premature Infant recommended reference ranges:  Up to 24 hours.............<8.0 mg/dL  Up to 48 hours............<12.0 mg/dL  3-5 days..................<15.0 mg/dL  6-29 days.................<15.0 mg/dL       Alkaline Phosphatase   Date Value Ref Range Status   06/03/2022 58 55 - 135 U/L Final     AST   Date Value Ref Range Status   06/03/2022 17 10 - 40 U/L Final     ALT   Date Value Ref Range Status   06/03/2022 21 10 - 44 U/L Final     Anion Gap   Date Value Ref Range Status   06/14/2022 10 8 - 16 mmol/L Final       Xrays    CMS MANDATED QUALITY DATA - CT RADIATION 436     All CT scans at this facility utilize dose modulation, iterative reconstruction, and/or weight based dosing when  appropriate to reduce radiation dose to as low as reasonably achievable.     CLINICAL HISTORY:  57 years (1965) Male screening baseline     TECHNIQUE:  CT LUNG SCREENING. 697 images obtained. CT scan of the chest was performed without intravenous contrast.     CONTRAST:  No IV contrast was administered     COMPARISON:  Most recent CT from September 12, 2019.     FINDINGS:.  Visualized neck: Within normal limits.  Lungs: Mild apical emphysematous lung disease. No concerning pulmonary nodule or mass is identified.  Airway: The trachea and central bronchial tree appear normal.  Pleura: There is no pleural effusion. There is no pneumothorax.  Cardiovascular: The heart is normal in size. There is no pericardial effusion. The thoracic aorta and great vessels are normal in course and caliber.  Mediastinum: There is no supraclavicular, axillary, mediastinal, or hilar lymphadenopathy.  Soft tissues: There is prominence of the breast tissue, possibly representing gynecomastia.  Musculoskeletal: The visualized osseous structures appear normal.  There are no suspicious osseous lesions.  Esophagus: The esophagus appears grossly normal.  Upper Abdomen: The visualized upper abdominal organs appear normal.     IMPRESSION:  No acute cardiac or pulmonary process.                 LUNG-RADS CATEGORY 1: NEGATIVE     RECOMMENDATION: Continued annual screening with LDCT in 12 months.           .     Electronically signed by:  Benton Deleon MD  5/16/2022 10:42 AM CDT Workstation: [id number]    Impression/Plan    Problem List Items Addressed This Visit          Pulmonary    COPD (chronic obstructive pulmonary disease), severe     Continue present medications.  Will refill medications as needed.  Instructed patient to contact us with any issues concerning their medications (cost, reactions, etc.).  Have discussed with patient about inciting conditions which may exacerbate their disease.  We did discuss possible new therapies or  de-escalation of therapy (if appropriate).  Asked patient if they were interested in pursuing pulmonary rehabilitation.  All questions answered  RTC 3 months  Patient instructed that they are to call if symptoms change or new issues develop prior to their next visit.           SOB (shortness of breath), functional class IV     At baseline  Any increase in symptoms is due to being out of meds         Lung transplant candidate     Aware             ID    History of COVID-19     Aware, no long Covid symptoms            Other    History of smoking, quit 4/2013, 50 pack-years     Aware                   Asa Morin MD

## 2023-02-02 NOTE — ASSESSMENT & PLAN NOTE
Patient would like these medications refilled by Dr. Ortiz and send to [facility].    VINICIUS House. Ph.     · Aware

## 2023-02-28 ENCOUNTER — CLINICAL SUPPORT (OUTPATIENT)
Dept: SMOKING CESSATION | Facility: CLINIC | Age: 58
End: 2023-02-28

## 2023-02-28 DIAGNOSIS — F17.200 NICOTINE DEPENDENCE: Primary | ICD-10-CM

## 2023-02-28 PROCEDURE — 99407 PR TOBACCO USE CESSATION INTENSIVE >10 MINUTES: ICD-10-PCS | Mod: S$GLB,,,

## 2023-02-28 PROCEDURE — 99999 PR PBB SHADOW E&M-EST. PATIENT-LVL I: CPT | Mod: PBBFAC,,,

## 2023-02-28 PROCEDURE — 99999 PR PBB SHADOW E&M-EST. PATIENT-LVL I: ICD-10-PCS | Mod: PBBFAC,,,

## 2023-02-28 PROCEDURE — 99407 BEHAV CHNG SMOKING > 10 MIN: CPT | Mod: S$GLB,,,

## 2023-02-28 NOTE — PROGRESS NOTES
Spoke with patient today in regard to smoking cessation progress for 6 month telephone follow up, he states not tobacco free. Patient states he was quit for 3 months and had a lapse during the carnival season. Commended patient on the accomplishment of a quit. Patient states he is not ready to return tot he program at this time and will call when ready. Informed patient of benefit period, future follow up, and contact information if any further help or support is needed. Will complete smart form for 6 month follow up on Quit attempt #1.

## 2023-04-03 DIAGNOSIS — R42 DIZZINESS: Primary | ICD-10-CM

## 2023-04-12 DIAGNOSIS — J44.9 CHRONIC OBSTRUCTIVE PULMONARY DISEASE, UNSPECIFIED COPD TYPE: ICD-10-CM

## 2023-04-12 DIAGNOSIS — F41.9 ANXIETY: Primary | ICD-10-CM

## 2023-04-12 RX ORDER — ALPRAZOLAM 1 MG/1
1 TABLET ORAL 2 TIMES DAILY
Qty: 60 TABLET | Refills: 0 | Status: SHIPPED | OUTPATIENT
Start: 2023-04-12 | End: 2023-05-02

## 2023-04-12 RX ORDER — ATENOLOL 25 MG/1
25 TABLET ORAL DAILY
Qty: 30 TABLET | Refills: 11 | Status: SHIPPED | OUTPATIENT
Start: 2023-04-12 | End: 2024-04-11

## 2023-04-17 ENCOUNTER — HOSPITAL ENCOUNTER (OUTPATIENT)
Dept: RADIOLOGY | Facility: HOSPITAL | Age: 58
Discharge: HOME | End: 2023-04-17
Attending: INTERNAL MEDICINE
Payer: MEDICARE

## 2023-04-17 DIAGNOSIS — R42 DIZZINESS: ICD-10-CM

## 2023-04-17 PROCEDURE — 70450 CT HEAD/BRAIN W/O DYE: CPT | Mod: TC,PO

## 2023-05-01 ENCOUNTER — TELEPHONE (OUTPATIENT)
Dept: PULMONOLOGY | Facility: CLINIC | Age: 58
End: 2023-05-01

## 2023-05-01 DIAGNOSIS — M16.11 PRIMARY OSTEOARTHRITIS OF RIGHT HIP: ICD-10-CM

## 2023-05-01 DIAGNOSIS — M17.12 PRIMARY OSTEOARTHRITIS OF LEFT KNEE: Primary | ICD-10-CM

## 2023-05-01 DIAGNOSIS — F41.9 ANXIETY: ICD-10-CM

## 2023-05-01 NOTE — TELEPHONE ENCOUNTER
Spoke with Erick mathis he spoke with the [facility] pharmacy we sent out prescription for xanax for 1 mg for 2x a day he normally does 90 for 3 x a day . He said we just need to send a new rx to [facility] an they will fix it .

## 2023-05-02 RX ORDER — ALPRAZOLAM 1 MG/1
TABLET ORAL
Qty: 90 TABLET | Refills: 3 | Status: SHIPPED | OUTPATIENT
Start: 2023-05-02 | End: 2023-08-17 | Stop reason: SDUPTHER

## 2023-05-04 ENCOUNTER — OFFICE VISIT (OUTPATIENT)
Dept: PULMONOLOGY | Facility: CLINIC | Age: 58
End: 2023-05-04
Payer: MEDICARE

## 2023-05-04 VITALS
SYSTOLIC BLOOD PRESSURE: 123 MMHG | HEART RATE: 73 BPM | DIASTOLIC BLOOD PRESSURE: 80 MMHG | BODY MASS INDEX: 29.37 KG/M2 | OXYGEN SATURATION: 99 % | WEIGHT: 187.5 LBS

## 2023-05-04 DIAGNOSIS — J44.9 CHRONIC OBSTRUCTIVE PULMONARY DISEASE, UNSPECIFIED COPD TYPE: ICD-10-CM

## 2023-05-04 DIAGNOSIS — Z76.82 LUNG TRANSPLANT CANDIDATE: ICD-10-CM

## 2023-05-04 DIAGNOSIS — Z87.891 PERSONAL HISTORY OF TOBACCO USE, PRESENTING HAZARDS TO HEALTH: Primary | ICD-10-CM

## 2023-05-04 DIAGNOSIS — Z87.891 HISTORY OF SMOKING: ICD-10-CM

## 2023-05-04 PROCEDURE — 3074F PR MOST RECENT SYSTOLIC BLOOD PRESSURE < 130 MM HG: ICD-10-PCS | Mod: CPTII,S$GLB,, | Performed by: INTERNAL MEDICINE

## 2023-05-04 PROCEDURE — 3079F PR MOST RECENT DIASTOLIC BLOOD PRESSURE 80-89 MM HG: ICD-10-PCS | Mod: CPTII,S$GLB,, | Performed by: INTERNAL MEDICINE

## 2023-05-04 PROCEDURE — 3079F DIAST BP 80-89 MM HG: CPT | Mod: CPTII,S$GLB,, | Performed by: INTERNAL MEDICINE

## 2023-05-04 PROCEDURE — 1160F PR REVIEW ALL MEDS BY PRESCRIBER/CLIN PHARMACIST DOCUMENTED: ICD-10-PCS | Mod: CPTII,S$GLB,, | Performed by: INTERNAL MEDICINE

## 2023-05-04 PROCEDURE — 3008F BODY MASS INDEX DOCD: CPT | Mod: CPTII,S$GLB,, | Performed by: INTERNAL MEDICINE

## 2023-05-04 PROCEDURE — 3008F PR BODY MASS INDEX (BMI) DOCUMENTED: ICD-10-PCS | Mod: CPTII,S$GLB,, | Performed by: INTERNAL MEDICINE

## 2023-05-04 PROCEDURE — 1160F RVW MEDS BY RX/DR IN RCRD: CPT | Mod: CPTII,S$GLB,, | Performed by: INTERNAL MEDICINE

## 2023-05-04 PROCEDURE — 99214 OFFICE O/P EST MOD 30 MIN: CPT | Mod: S$GLB,,, | Performed by: INTERNAL MEDICINE

## 2023-05-04 PROCEDURE — 99214 PR OFFICE/OUTPT VISIT, EST, LEVL IV, 30-39 MIN: ICD-10-PCS | Mod: S$GLB,,, | Performed by: INTERNAL MEDICINE

## 2023-05-04 PROCEDURE — 1159F MED LIST DOCD IN RCRD: CPT | Mod: CPTII,S$GLB,, | Performed by: INTERNAL MEDICINE

## 2023-05-04 PROCEDURE — 1159F PR MEDICATION LIST DOCUMENTED IN MEDICAL RECORD: ICD-10-PCS | Mod: CPTII,S$GLB,, | Performed by: INTERNAL MEDICINE

## 2023-05-04 PROCEDURE — 3074F SYST BP LT 130 MM HG: CPT | Mod: CPTII,S$GLB,, | Performed by: INTERNAL MEDICINE

## 2023-05-04 NOTE — PROGRESS NOTES
Office Visit Note *    Patient Name: Erick Graves Jr.  MRN: [id number]  : 1965      Reason for visit: COPD    HPI:     2021 - 57 yo male has been a pt of Dr Ibanez with COPD (stage IV C), + smoking (quit 2019 about 80 pack years) who is here to establish care.  He has been on BREZTRI and prn NATHAN and is doing well.  He does have issues with anxiety (we discussed this).  He does have a small periumbilical hernia.  Has seen Dr Rao for transplant evaluation in 2019.  Last CT scan that I can find is 2019 - nothing to be concerned about.  Waiting on records from Dr Ibanez's office.  Did reviewed some limited records from Dr Ibanez's office.      2021 - Here for follow up, Pt is currently stable on present medications with no recent increases in their symptoms or use of rescue medications.  Since our last visit there have been no hospitalizations or ER visits for their respiratory issues and there does not seem to be anything to suggest unrecognized exacerbations.  I have reviewed the medical regimen and re-educated the pt on the role of rescue and controlling medications.  Inhaler technique and understanding seems adequate.  The patient reports no issues with any of there medications for their COPD.  Refills will be taken care of as needed.  All questions answered.  Has a probable small periumbilical hernia that he wants evaluated.   Reports that he had Covid a few months ago but did not get very sick.    10/6/2021 - Here for follow up, Pt is currently stable on present medications with no recent increases in their symptoms or use of rescue medications.  Since our last visit there have been no hospitalizations or ER visits for their respiratory issues and there does not seem to be anything to suggest unrecognized exacerbations.  I have reviewed the medical regimen and re-educated the pt on the role of rescue and controlling medications.  Inhaler technique and understanding seems adequate.  The  patient reports no issues with any of there medications for their COPD.  Refills will be taken care of as needed.  All questions answered.    2/3/2022 - Here for follow, Pt is currently stable on present medications with no recent increases in their symptoms or use of rescue medications.  Since our last visit there have been no hospitalizations or ER visits for their respiratory issues and there does not seem to be anything to suggest unrecognized exacerbations.  I have reviewed the medical regimen and re-educated the pt on the role of rescue and controlling medications.  Inhaler technique and understanding seems adequate.  The patient reports no issues with any of there medications for their COPD.  Refills will be taken care of as needed.  All questions answered.  Patient has no known corona virus exposures and has been practicing social distancing.  We have discussed the virus and precautions and all questions have been answered.  He did have Covid during the first wave.  Has had 1 Covid vaccine but got very sick with it.    5/5/2022 - Here for follow up, Pt is currently stable on present medications with no recent increases in their symptoms or use of rescue medications.  Since our last visit there have been no hospitalizations or ER visits for their respiratory issues and there does not seem to be anything to suggest unrecognized exacerbations.  I have reviewed the medical regimen and re-educated the pt on the role of rescue and controlling medications.  Inhaler technique and understanding seems adequate.  The patient reports no issues with any of there medications for their COPD.  Refills will be taken care of as needed.  All questions answered.  Has had some increase in SOB, CARUSO and congestion but has not been taking BREZTRI regularly due to donut hole issues (we gave samples).  Patient has no known corona virus exposures and has been practicing social distancing.  We have discussed the virus and precautions  and all questions have been answered.  When he was smoking he smoked 1-1.5 PPD for about 35 years.    8/3/2022 - Here for follow up, Pt is currently stable on present medications with no recent increases in their symptoms or use of rescue medications.  Since our last visit there have been no hospitalizations or ER visits for their respiratory issues and there does not seem to be anything to suggest unrecognized exacerbations.  I have reviewed the medical regimen and re-educated the pt on the role of rescue and controlling medications.  Inhaler technique and understanding seems adequate.  The patient reports no issues with any of there medications for their COPD.  Refills will be taken care of as needed.  All questions answered.  Out of Breztri for a few days and some increased SOB.  Has had hernia surgery and did well.  Patient has no known corona virus exposures and has been practicing social distancing.  We have discussed the virus and precautions and all questions have been answered.    11/1/2022 - Here for follow up, Pt is currently stable on present medications with no recent increases in their symptoms or use of rescue medications.  Since our last visit there have been no hospitalizations or ER visits for their respiratory issues and there does not seem to be anything to suggest unrecognized exacerbations.  I have reviewed the medical regimen and re-educated the pt on the role of rescue and controlling medications.  Inhaler technique and understanding seems adequate.  The patient reports no issues with any of there medications for their COPD.  Refills will be taken care of as needed.  All questions answered.  He does feel that he may be more symptomatic with exertion.  He has gained a little weight as well. He is interested in getting back into exercise and wants a referral to pulmonary rehab.  Does not want to get the flu shot.    2/2/2023 - Here for follow up, he has run out of inhalers and is having trouble  affording any and unfortunately due to administrative decisions we do not have any samples.  He has noted some increased dyspnea since running out of meds.  Still not smoking and using nicotine lozenges.      5/4/2023 - Here for follow up, Pt is currently stable on present medications with no recent increases in their symptoms or use of rescue medications.  Since our last visit there have been no hospitalizations or ER visits for their respiratory issues and there does not seem to be anything to suggest unrecognized exacerbations.  I have reviewed the medical regimen and re-educated the pt on the role of rescue and controlling medications.  Inhaler technique and understanding seems adequate.  The patient reports no issues with any of there medications for their COPD.  Refills will be taken care of as needed.  All questions answered.  Still with issues regarding cost of meds.  Still not smoking.  He is to have right wrist surgery next week    COPD Flowsheet    GOLD III D   Last PFT - 8/2021  FEV1- 37 % DLCO - 64 %       +ICS/LABA/LAMA    + prn NATHAN    mMRC -  0 - SOB with strenuous exercise   -  1 - SOB level ground, slight hill   -  2 - SOB walk slower or stop for breath level ground   - + 3 - SOB at 100 yards or after few minutes   -  4 - SOB in house, dressing    Referral to PULMONARY REHABILITATION - yes          Low Dose Screening CT Chest    Cigarettes - 1-1.5 PPD x 35 YEARS  Still smoking -  NO (quit 2022)    Date of last LD CT - 5/2022    Result - emphysema, o/w no concerning findings, needs repeat now    I have discussed with pt about using a screening CT of the chest due to history of cigarette smoking.  We have discussed the possible findings and possible actions as a result of these findings.  The pt would like to proceed.    Past Medical History    Past Medical History:   Diagnosis Date    COPD (chronic obstructive pulmonary disease)     Hepatitis     As a child turned jaundice    Hypertension     Peptic  ulcer disease     Wears dentures     upper       Past Surgical History     Past Surgical History:   Procedure Laterality Date    CYST REMOVAL      BY TESTICAL    ROBOT-ASSISTED LAPAROSCOPIC REPAIR OF VENTRAL HERNIA N/A 6/17/2022    Procedure: ROBOTIC REPAIR, HERNIA, VENTRAL;  Surgeon: Phillip Chao III, MD;  Location: [facility];  Service: General;  Laterality: N/A;       Medications      Current Outpatient Medications:     ALPRAZolam (XANAX) 1 MG tablet, TAKE 1 TABLET BY MOUTH THREE TIMES A DAY AS NEEDED FOR ANXIETY, Disp: 90 tablet, Rfl: 3    atenoloL (TENORMIN) 25 MG tablet, TAKE 1 TABLET BY MOUTH ONCE A DAY, Disp: 30 tablet, Rfl: 11    atenoloL (TENORMIN) 25 MG tablet, Take 1 tablet (25 mg total) by mouth once daily., Disp: 30 tablet, Rfl: 11    budesonide-glycopyr-formoterol (BREZTRI AEROSPHERE) 160-9-4.8 mcg/actuation HFAA, Inhale 2 puffs into the lungs 2 (two) times daily., Disp: 10.7 g, Rfl: 6    albuterol (PROVENTIL/VENTOLIN HFA) 90 mcg/actuation inhaler, Inhale 2 puffs into the lungs every 6 (six) hours as needed for Shortness of Breath. Rescue (Patient not taking: Reported on 5/4/2023), Disp: 18 g, Rfl: 11    aspirin (ECOTRIN) 81 MG EC tablet, Every day, Disp: , Rfl:     HYDROcodone-acetaminophen (NORCO) 5-325 mg per tablet, Take 1 tablet by mouth every 6 (six) hours as needed for Pain. (Patient not taking: Reported on 1/12/2023), Disp: 25 tablet, Rfl: 0    meloxicam (MOBIC) 15 MG tablet, Take 1 tablet (15 mg total) by mouth once daily. (Patient not taking: Reported on 1/12/2023), Disp: 14 tablet, Rfl: 0    nicotine (NICODERM CQ) 21 mg/24 hr, Place 1 patch onto the skin once daily. (Patient not taking: Reported on 5/4/2023), Disp: 28 patch, Rfl: 0    nicotine polacrilex 2 MG Lozg, Take 1 lozenge (2 mg total) by mouth as needed (Use in place of cigarettes)., Disp: 168 lozenge, Rfl: 0    Allergies    Review of patient's allergies indicates:   Allergen Reactions    Codeine      Other reaction(s): Unknown  patient states he never had an allergic reaction    Crestor [rosuvastatin] Other (See Comments)     Stomach soreness    Lipitor [atorvastatin] Other (See Comments)     Stomach soreness      Penicillins      Other reaction(s): Unknown       SocHx    Social History     Tobacco Use   Smoking Status Every Day    Packs/day: 0.50    Years: 34.00    Pack years: 17.00    Types: Cigarettes    Last attempt to quit: 2022    Years since quittin.6   Smokeless Tobacco Never   Tobacco Comments    OCC CIGARETTE       Social History     Substance and Sexual Activity   Alcohol Use Yes    Comment: occasional       Drug Use - no  Occupation -  work x 30 years - retired/disabled  Asbestos exposure - no  Pets - dog    FMHx    History reviewed. No pertinent family history.      Review of Systems  Review of Systems   Constitutional:  Positive for malaise/fatigue. Negative for chills, diaphoresis, fever and weight loss.   HENT:  Negative for congestion.    Eyes:  Negative for pain.   Respiratory:  Positive for cough (chronic) and shortness of breath. Negative for hemoptysis, sputum production, wheezing and stridor.    Cardiovascular:  Negative for chest pain, palpitations, orthopnea, claudication, leg swelling and PND.   Gastrointestinal:  Negative for abdominal pain, blood in stool, constipation, diarrhea, heartburn, melena, nausea and vomiting.   Genitourinary:  Negative for dysuria, frequency, hematuria and urgency.   Musculoskeletal:  Negative for falls and myalgias.   Neurological:  Negative for dizziness, tingling, tremors, sensory change, speech change, focal weakness, seizures, loss of consciousness, weakness and headaches.   Psychiatric/Behavioral:  Negative for depression, substance abuse and suicidal ideas. The patient is nervous/anxious.      Physical Exam    Vitals:    23 0950   BP: 123/80   BP Location: Left arm   Patient Position: Sitting   BP Method: Medium (Manual)   Pulse: 73   SpO2: 99%   Weight:  85 kg (187 lb 8 oz)       Physical Exam  Vitals and nursing note reviewed.   Constitutional:       General: He is not in acute distress.     Appearance: Normal appearance. He is well-developed. He is not ill-appearing, toxic-appearing or diaphoretic.   HENT:      Head: Normocephalic and atraumatic.      Right Ear: External ear normal.      Left Ear: External ear normal.      Nose: Nose normal.   Eyes:      General: No scleral icterus.        Right eye: No discharge.         Left eye: No discharge.      Extraocular Movements: Extraocular movements intact.      Conjunctiva/sclera: Conjunctivae normal.      Pupils: Pupils are equal, round, and reactive to light.   Neck:      Thyroid: No thyromegaly.      Vascular: No JVD.      Trachea: No tracheal deviation.   Cardiovascular:      Rate and Rhythm: Normal rate and regular rhythm.      Heart sounds: Normal heart sounds. No murmur heard.    No friction rub. No gallop.   Pulmonary:      Effort: Pulmonary effort is normal. No respiratory distress.      Breath sounds: Normal breath sounds. No stridor. No wheezing, rhonchi or rales.      Comments: Decreased BS throughout  No acc m use  Chest:      Chest wall: No tenderness.   Abdominal:      General: Bowel sounds are normal. There is no distension.      Palpations: Abdomen is soft.      Comments: Small periumbilical hernia   Musculoskeletal:         General: No tenderness. Normal range of motion.      Cervical back: Normal range of motion and neck supple. No rigidity or tenderness.      Right lower leg: No edema.      Left lower leg: No edema.   Lymphadenopathy:      Cervical: No cervical adenopathy.   Skin:     General: Skin is warm and dry.      Capillary Refill: Capillary refill takes less than 2 seconds.   Neurological:      General: No focal deficit present.      Mental Status: He is alert and oriented to person, place, and time. Mental status is at baseline.      Cranial Nerves: No cranial nerve deficit.      Motor: No  weakness.      Gait: Gait normal.      Deep Tendon Reflexes: Reflexes are normal and symmetric.   Psychiatric:         Mood and Affect: Mood normal.         Behavior: Behavior normal.         Thought Content: Thought content normal.         Judgment: Judgment normal.       Labs    Lab Results   Component Value Date    WBC 6.77 06/14/2022    HGB 14.6 06/14/2022    HCT 43.0 06/14/2022     06/14/2022       Sodium   Date Value Ref Range Status   06/14/2022 139 136 - 145 mmol/L Final     Potassium   Date Value Ref Range Status   06/14/2022 4.7 3.5 - 5.1 mmol/L Final     Chloride   Date Value Ref Range Status   06/14/2022 107 95 - 110 mmol/L Final     CO2   Date Value Ref Range Status   06/14/2022 22 (L) 23 - 29 mmol/L Final     Glucose   Date Value Ref Range Status   06/14/2022 111 (H) 70 - 110 mg/dL Final     BUN   Date Value Ref Range Status   06/14/2022 12 6 - 20 mg/dL Final     Creatinine   Date Value Ref Range Status   06/14/2022 1.1 0.5 - 1.4 mg/dL Final     Calcium   Date Value Ref Range Status   06/14/2022 9.0 8.7 - 10.5 mg/dL Final     Total Protein   Date Value Ref Range Status   06/03/2022 7.8 6.0 - 8.4 g/dL Final     Albumin   Date Value Ref Range Status   11/02/2022 5.0 (H) 3.8 - 4.9 g/dL Final   07/23/2020 4.5 3.6 - 5.1 g/dL Final     Comment:     For additional information, please refer to   http://education.[employer]/faq/ZZQ363 (This link is   being provided for informational/ educational purposes only.)  This test was developed and its analytical performance   characteristics have been determined by [facility]  [facility]. It has not been cleared or approved by the   [place] Food and Drug Administration. This assay has been validated   pursuant to the CLIA regulations and is used for clinical   purposes.  @ Test Performed By:  [facility] [place]  Jonathan Landers M.D., Ph.D.,   [address] [place] [id number]  [facility]   [id number]       Total Bilirubin   Date Value Ref Range Status   06/03/2022 0.9 0.1 - 1.0 mg/dL Final     Comment:     For infants and newborns, interpretation of results should be based  on gestational age, weight and in agreement with clinical  observations.    Premature Infant recommended reference ranges:  Up to 24 hours.............<8.0 mg/dL  Up to 48 hours............<12.0 mg/dL  3-5 days..................<15.0 mg/dL  6-29 days.................<15.0 mg/dL       Alkaline Phosphatase   Date Value Ref Range Status   06/03/2022 58 55 - 135 U/L Final     AST   Date Value Ref Range Status   06/03/2022 17 10 - 40 U/L Final     ALT   Date Value Ref Range Status   06/03/2022 21 10 - 44 U/L Final     Anion Gap   Date Value Ref Range Status   06/14/2022 10 8 - 16 mmol/L Final       Xrays        Impression/Plan    Problem List Items Addressed This Visit          Pulmonary    COPD (chronic obstructive pulmonary disease), severe     Continue present medications.  Will refill medications as needed.  Instructed patient to contact us with any issues concerning their medications (cost, reactions, etc.).  Have discussed with patient about inciting conditions which may exacerbate their disease.  We did discuss possible new therapies or de-escalation of therapy (if appropriate).  Asked patient if they were interested in pursuing pulmonary rehabilitation.  All questions answered  RTC 3 months  Patient instructed that they are to call if symptoms change or new issues develop prior to their next visit.           Lung transplant candidate     Aware             Other    History of smoking, quit 4/2013, 50 pack-years     Quit again about 1 year ago  Will follow          Other Visit Diagnoses       Personal history of tobacco use, presenting hazards to health    -  Primary    Relevant Orders    CT Chest Lung Screening Low Dose                    Asa Morin MD

## 2023-05-25 ENCOUNTER — HOSPITAL ENCOUNTER (OUTPATIENT)
Dept: RADIOLOGY | Facility: HOSPITAL | Age: 58
Discharge: HOME | End: 2023-05-25
Attending: INTERNAL MEDICINE
Payer: MEDICARE

## 2023-05-25 DIAGNOSIS — Z87.891 PERSONAL HISTORY OF TOBACCO USE, PRESENTING HAZARDS TO HEALTH: ICD-10-CM

## 2023-05-25 PROCEDURE — 71271 CT THORAX LUNG CANCER SCR C-: CPT | Mod: TC,PO

## 2023-05-30 DIAGNOSIS — Z87.891 FORMER SMOKER: ICD-10-CM

## 2023-05-30 DIAGNOSIS — Z13.6 ENCOUNTER FOR ABDOMINAL AORTIC ANEURYSM SCREENING: Primary | ICD-10-CM

## 2023-06-09 ENCOUNTER — HOSPITAL ENCOUNTER (OUTPATIENT)
Dept: RADIOLOGY | Facility: HOSPITAL | Age: 58
Discharge: HOME | End: 2023-06-09
Attending: INTERNAL MEDICINE
Payer: MEDICARE

## 2023-06-09 DIAGNOSIS — Z87.891 FORMER SMOKER: ICD-10-CM

## 2023-06-09 DIAGNOSIS — Z13.6 ENCOUNTER FOR ABDOMINAL AORTIC ANEURYSM SCREENING: ICD-10-CM

## 2023-06-09 PROCEDURE — 76706 US ABDL AORTA SCREEN AAA: CPT | Mod: TC,PO

## 2023-06-14 ENCOUNTER — TELEPHONE (OUTPATIENT)
Dept: PULMONOLOGY | Facility: CLINIC | Age: 58
End: 2023-06-14

## 2023-06-14 NOTE — TELEPHONE ENCOUNTER
Patient called said he is seeing a new pcp an they said they saw a nodule on his left upper lobe . I informed him that we did a recent ct an we knew about this . He wants you to make sure you looked at everything .

## 2023-08-03 ENCOUNTER — OFFICE VISIT (OUTPATIENT)
Dept: PULMONOLOGY | Facility: CLINIC | Age: 58
End: 2023-08-03
Payer: MEDICARE

## 2023-08-03 VITALS
SYSTOLIC BLOOD PRESSURE: 123 MMHG | BODY MASS INDEX: 29.91 KG/M2 | OXYGEN SATURATION: 95 % | HEART RATE: 79 BPM | WEIGHT: 191 LBS | DIASTOLIC BLOOD PRESSURE: 80 MMHG

## 2023-08-03 DIAGNOSIS — J44.9 CHRONIC OBSTRUCTIVE PULMONARY DISEASE, UNSPECIFIED COPD TYPE: ICD-10-CM

## 2023-08-03 DIAGNOSIS — Z87.891 HISTORY OF SMOKING: ICD-10-CM

## 2023-08-03 PROCEDURE — 1159F MED LIST DOCD IN RCRD: CPT | Mod: CPTII,S$GLB,, | Performed by: INTERNAL MEDICINE

## 2023-08-03 PROCEDURE — 1159F PR MEDICATION LIST DOCUMENTED IN MEDICAL RECORD: ICD-10-PCS | Mod: CPTII,S$GLB,, | Performed by: INTERNAL MEDICINE

## 2023-08-03 PROCEDURE — 3008F PR BODY MASS INDEX (BMI) DOCUMENTED: ICD-10-PCS | Mod: CPTII,S$GLB,, | Performed by: INTERNAL MEDICINE

## 2023-08-03 PROCEDURE — 3008F BODY MASS INDEX DOCD: CPT | Mod: CPTII,S$GLB,, | Performed by: INTERNAL MEDICINE

## 2023-08-03 PROCEDURE — 3079F PR MOST RECENT DIASTOLIC BLOOD PRESSURE 80-89 MM HG: ICD-10-PCS | Mod: CPTII,S$GLB,, | Performed by: INTERNAL MEDICINE

## 2023-08-03 PROCEDURE — 3079F DIAST BP 80-89 MM HG: CPT | Mod: CPTII,S$GLB,, | Performed by: INTERNAL MEDICINE

## 2023-08-03 PROCEDURE — 99214 PR OFFICE/OUTPT VISIT, EST, LEVL IV, 30-39 MIN: ICD-10-PCS | Mod: S$GLB,,, | Performed by: INTERNAL MEDICINE

## 2023-08-03 PROCEDURE — 99214 OFFICE O/P EST MOD 30 MIN: CPT | Mod: S$GLB,,, | Performed by: INTERNAL MEDICINE

## 2023-08-03 PROCEDURE — 1160F PR REVIEW ALL MEDS BY PRESCRIBER/CLIN PHARMACIST DOCUMENTED: ICD-10-PCS | Mod: CPTII,S$GLB,, | Performed by: INTERNAL MEDICINE

## 2023-08-03 PROCEDURE — 1160F RVW MEDS BY RX/DR IN RCRD: CPT | Mod: CPTII,S$GLB,, | Performed by: INTERNAL MEDICINE

## 2023-08-03 PROCEDURE — 3074F PR MOST RECENT SYSTOLIC BLOOD PRESSURE < 130 MM HG: ICD-10-PCS | Mod: CPTII,S$GLB,, | Performed by: INTERNAL MEDICINE

## 2023-08-03 PROCEDURE — 3074F SYST BP LT 130 MM HG: CPT | Mod: CPTII,S$GLB,, | Performed by: INTERNAL MEDICINE

## 2023-08-03 RX ORDER — METFORMIN HYDROCHLORIDE 500 MG/1
TABLET, EXTENDED RELEASE ORAL
COMMUNITY
Start: 2023-05-23

## 2023-08-03 NOTE — PROGRESS NOTES
Office Visit Note *    Patient Name: Erick Graves Jr.  MRN: [id number]  : 1965      Reason for visit: COPD    HPI:     2021 - 57 yo male has been a pt of Dr Ibanez with COPD (stage IV C), + smoking (quit 2019 about 80 pack years) who is here to establish care.  He has been on BREZTRI and prn NATHAN and is doing well.  He does have issues with anxiety (we discussed this).  He does have a small periumbilical hernia.  Has seen Dr Rao for transplant evaluation in 2019.  Last CT scan that I can find is 2019 - nothing to be concerned about.  Waiting on records from Dr Ibanez's office.  Did reviewed some limited records from Dr Ibanez's office.      2021 - Here for follow up, Pt is currently stable on present medications with no recent increases in their symptoms or use of rescue medications.  Since our last visit there have been no hospitalizations or ER visits for their respiratory issues and there does not seem to be anything to suggest unrecognized exacerbations.  I have reviewed the medical regimen and re-educated the pt on the role of rescue and controlling medications.  Inhaler technique and understanding seems adequate.  The patient reports no issues with any of there medications for their COPD.  Refills will be taken care of as needed.  All questions answered.  Has a probable small periumbilical hernia that he wants evaluated.   Reports that he had Covid a few months ago but did not get very sick.    10/6/2021 - Here for follow up, Pt is currently stable on present medications with no recent increases in their symptoms or use of rescue medications.  Since our last visit there have been no hospitalizations or ER visits for their respiratory issues and there does not seem to be anything to suggest unrecognized exacerbations.  I have reviewed the medical regimen and re-educated the pt on the role of rescue and controlling medications.  Inhaler technique and understanding seems adequate.  The  patient reports no issues with any of there medications for their COPD.  Refills will be taken care of as needed.  All questions answered.    2/3/2022 - Here for follow, Pt is currently stable on present medications with no recent increases in their symptoms or use of rescue medications.  Since our last visit there have been no hospitalizations or ER visits for their respiratory issues and there does not seem to be anything to suggest unrecognized exacerbations.  I have reviewed the medical regimen and re-educated the pt on the role of rescue and controlling medications.  Inhaler technique and understanding seems adequate.  The patient reports no issues with any of there medications for their COPD.  Refills will be taken care of as needed.  All questions answered.  Patient has no known corona virus exposures and has been practicing social distancing.  We have discussed the virus and precautions and all questions have been answered.  He did have Covid during the first wave.  Has had 1 Covid vaccine but got very sick with it.    5/5/2022 - Here for follow up, Pt is currently stable on present medications with no recent increases in their symptoms or use of rescue medications.  Since our last visit there have been no hospitalizations or ER visits for their respiratory issues and there does not seem to be anything to suggest unrecognized exacerbations.  I have reviewed the medical regimen and re-educated the pt on the role of rescue and controlling medications.  Inhaler technique and understanding seems adequate.  The patient reports no issues with any of there medications for their COPD.  Refills will be taken care of as needed.  All questions answered.  Has had some increase in SOB, CARUSO and congestion but has not been taking BREZTRI regularly due to donut hole issues (we gave samples).  Patient has no known corona virus exposures and has been practicing social distancing.  We have discussed the virus and precautions  and all questions have been answered.  When he was smoking he smoked 1-1.5 PPD for about 35 years.    8/3/2022 - Here for follow up, Pt is currently stable on present medications with no recent increases in their symptoms or use of rescue medications.  Since our last visit there have been no hospitalizations or ER visits for their respiratory issues and there does not seem to be anything to suggest unrecognized exacerbations.  I have reviewed the medical regimen and re-educated the pt on the role of rescue and controlling medications.  Inhaler technique and understanding seems adequate.  The patient reports no issues with any of there medications for their COPD.  Refills will be taken care of as needed.  All questions answered.  Out of Breztri for a few days and some increased SOB.  Has had hernia surgery and did well.  Patient has no known corona virus exposures and has been practicing social distancing.  We have discussed the virus and precautions and all questions have been answered.    11/1/2022 - Here for follow up, Pt is currently stable on present medications with no recent increases in their symptoms or use of rescue medications.  Since our last visit there have been no hospitalizations or ER visits for their respiratory issues and there does not seem to be anything to suggest unrecognized exacerbations.  I have reviewed the medical regimen and re-educated the pt on the role of rescue and controlling medications.  Inhaler technique and understanding seems adequate.  The patient reports no issues with any of there medications for their COPD.  Refills will be taken care of as needed.  All questions answered.  He does feel that he may be more symptomatic with exertion.  He has gained a little weight as well. He is interested in getting back into exercise and wants a referral to pulmonary rehab.  Does not want to get the flu shot.    2/2/2023 - Here for follow up, he has run out of inhalers and is having trouble  affording any and unfortunately due to administrative decisions we do not have any samples.  He has noted some increased dyspnea since running out of meds.  Still not smoking and using nicotine lozenges.      5/4/2023 - Here for follow up, Pt is currently stable on present medications with no recent increases in their symptoms or use of rescue medications.  Since our last visit there have been no hospitalizations or ER visits for their respiratory issues and there does not seem to be anything to suggest unrecognized exacerbations.  I have reviewed the medical regimen and re-educated the pt on the role of rescue and controlling medications.  Inhaler technique and understanding seems adequate.  The patient reports no issues with any of there medications for their COPD.  Refills will be taken care of as needed.  All questions answered.  Still with issues regarding cost of meds.  Still not smoking.  He is to have right wrist surgery next week    8/3/2023 - Here for follow up, Pt is currently stable on present medications with no recent increases in their symptoms or use of rescue medications.  Since our last visit there have been no hospitalizations or ER visits for their respiratory issues and there does not seem to be anything to suggest unrecognized exacerbations.  I have reviewed the medical regimen and re-educated the pt on the role of rescue and controlling medications.  Inhaler technique and understanding seems adequate.  The patient reports no issues with any of there medications for their COPD.  Refills will be taken care of as needed.  All questions answered.  He has felt a little light headed at times.      COPD Flowsheet    GOLD III D   Last PFT - 8/2021  FEV1- 37 % DLCO - 64 %       +ICS/LABA/LAMA    + prn NATHAN    mMRC -  0 - SOB with strenuous exercise   -  1 - SOB level ground, slight hill   -  2 - SOB walk slower or stop for breath level ground   - + 3 - SOB at 100 yards or after few minutes   -  4 - SOB  in house, dressing    Referral to PULMONARY REHABILITATION - yes          Low Dose Screening CT Chest    Cigarettes - 1-1.5 PPD x 35 YEARS  Still smoking -  NO (quit 2022)    Date of last LD CT - 5/2023    Result - emphysema, small nodules    I have discussed with pt about using a screening CT of the chest due to history of cigarette smoking.  We have discussed the possible findings and possible actions as a result of these findings.  The pt would like to proceed.    Past Medical History    Past Medical History:   Diagnosis Date    COPD (chronic obstructive pulmonary disease)     Hepatitis     As a child turned jaundice    Hypertension     Peptic ulcer disease     Wears dentures     upper       Past Surgical History     Past Surgical History:   Procedure Laterality Date    CYST REMOVAL      BY TESTICAL    ROBOT-ASSISTED LAPAROSCOPIC REPAIR OF VENTRAL HERNIA N/A 6/17/2022    Procedure: ROBOTIC REPAIR, HERNIA, VENTRAL;  Surgeon: Phillip Chao III, MD;  Location: [facility];  Service: General;  Laterality: N/A;       Medications      Current Outpatient Medications:     ALPRAZolam (XANAX) 1 MG tablet, TAKE 1 TABLET BY MOUTH THREE TIMES A DAY AS NEEDED FOR ANXIETY, Disp: 90 tablet, Rfl: 3    aspirin (ECOTRIN) 81 MG EC tablet, Every day, Disp: , Rfl:     atenoloL (TENORMIN) 25 MG tablet, TAKE 1 TABLET BY MOUTH ONCE A DAY, Disp: 30 tablet, Rfl: 11    atenoloL (TENORMIN) 25 MG tablet, Take 1 tablet (25 mg total) by mouth once daily., Disp: 30 tablet, Rfl: 11    budesonide-glycopyr-formoterol (BREZTRI AEROSPHERE) 160-9-4.8 mcg/actuation HFAA, Inhale 2 puffs into the lungs 2 (two) times daily., Disp: 10.7 g, Rfl: 6    metFORMIN (GLUCOPHAGE-XR) 500 MG ER 24hr tablet, , Disp: , Rfl:     nicotine polacrilex 2 MG Lozg, Take 1 lozenge (2 mg total) by mouth as needed (Use in place of cigarettes)., Disp: 168 lozenge, Rfl: 0    albuterol (PROVENTIL/VENTOLIN HFA) 90 mcg/actuation inhaler, Inhale 2 puffs into the lungs every 6 (six)  hours as needed for Shortness of Breath. Rescue (Patient not taking: Reported on 2023), Disp: 18 g, Rfl: 11    HYDROcodone-acetaminophen (NORCO) 5-325 mg per tablet, Take 1 tablet by mouth every 6 (six) hours as needed for Pain. (Patient not taking: Reported on 2023), Disp: 25 tablet, Rfl: 0    meloxicam (MOBIC) 15 MG tablet, Take 1 tablet (15 mg total) by mouth once daily. (Patient not taking: Reported on 2023), Disp: 14 tablet, Rfl: 0    nicotine (NICODERM CQ) 21 mg/24 hr, Place 1 patch onto the skin once daily. (Patient not taking: Reported on 2023), Disp: 28 patch, Rfl: 0    Allergies    Review of patient's allergies indicates:   Allergen Reactions    Codeine      Other reaction(s): Unknown patient states he never had an allergic reaction    Crestor [rosuvastatin] Other (See Comments)     Stomach soreness    Lipitor [atorvastatin] Other (See Comments)     Stomach soreness      Penicillins      Other reaction(s): Unknown       SocHx    Social History     Tobacco Use   Smoking Status Every Day    Current packs/day: 0.00    Average packs/day: 0.5 packs/day for 34.0 years (17.0 ttl pk-yrs)    Types: Cigarettes    Start date: 1988    Last attempt to quit: 2022    Years since quittin.9   Smokeless Tobacco Never   Tobacco Comments    OCC CIGARETTE       Social History     Substance and Sexual Activity   Alcohol Use Yes    Comment: occasional       Drug Use - no  Occupation -  work x 30 years - retired/disabled  Asbestos exposure - no  Pets - dog    FMHx    History reviewed. No pertinent family history.      Review of Systems  Review of Systems   Constitutional:  Positive for malaise/fatigue. Negative for chills, diaphoresis, fever and weight loss.   HENT:  Negative for congestion.    Eyes:  Negative for pain.   Respiratory:  Positive for cough (chronic) and shortness of breath. Negative for hemoptysis, sputum production, wheezing and stridor.    Cardiovascular:  Negative for  chest pain, palpitations, orthopnea, claudication, leg swelling and PND.   Gastrointestinal:  Negative for abdominal pain, blood in stool, constipation, diarrhea, heartburn, melena, nausea and vomiting.   Genitourinary:  Negative for dysuria, frequency, hematuria and urgency.   Musculoskeletal:  Negative for falls and myalgias.   Neurological:  Negative for dizziness, tingling, tremors, sensory change, speech change, focal weakness, seizures, loss of consciousness, weakness and headaches.   Psychiatric/Behavioral:  Negative for depression, substance abuse and suicidal ideas. The patient is nervous/anxious.        Physical Exam    Vitals:    08/03/23 1051   BP: 123/80   BP Location: Left arm   Patient Position: Sitting   BP Method: Medium (Manual)   Pulse: 79   SpO2: 95%   Weight: 86.6 kg (191 lb)       Physical Exam  Vitals and nursing note reviewed.   Constitutional:       General: He is not in acute distress.     Appearance: Normal appearance. He is well-developed. He is not ill-appearing, toxic-appearing or diaphoretic.   HENT:      Head: Normocephalic and atraumatic.      Right Ear: External ear normal.      Left Ear: External ear normal.      Nose: Nose normal.   Eyes:      General: No scleral icterus.        Right eye: No discharge.         Left eye: No discharge.      Extraocular Movements: Extraocular movements intact.      Conjunctiva/sclera: Conjunctivae normal.      Pupils: Pupils are equal, round, and reactive to light.   Neck:      Thyroid: No thyromegaly.      Vascular: No JVD.      Trachea: No tracheal deviation.   Cardiovascular:      Rate and Rhythm: Normal rate and regular rhythm.      Heart sounds: Normal heart sounds. No murmur heard.     No friction rub. No gallop.   Pulmonary:      Effort: Pulmonary effort is normal. No respiratory distress.      Breath sounds: Normal breath sounds. No stridor. No wheezing, rhonchi or rales.      Comments: Decreased BS throughout  No acc m use  Chest:       Chest wall: No tenderness.   Abdominal:      General: Bowel sounds are normal. There is no distension.      Palpations: Abdomen is soft.      Comments: Small periumbilical hernia   Musculoskeletal:         General: No tenderness. Normal range of motion.      Cervical back: Normal range of motion and neck supple. No rigidity or tenderness.      Right lower leg: No edema.      Left lower leg: No edema.   Lymphadenopathy:      Cervical: No cervical adenopathy.   Skin:     General: Skin is warm and dry.      Capillary Refill: Capillary refill takes less than 2 seconds.   Neurological:      General: No focal deficit present.      Mental Status: He is alert and oriented to person, place, and time. Mental status is at baseline.      Cranial Nerves: No cranial nerve deficit.      Motor: No weakness.      Gait: Gait normal.      Deep Tendon Reflexes: Reflexes are normal and symmetric.   Psychiatric:         Mood and Affect: Mood normal.         Behavior: Behavior normal.         Thought Content: Thought content normal.         Judgment: Judgment normal.         Labs    Lab Results   Component Value Date    WBC 6.77 06/14/2022    HGB 14.6 06/14/2022    HCT 43.0 06/14/2022     06/14/2022       Sodium   Date Value Ref Range Status   06/14/2022 139 136 - 145 mmol/L Final     Potassium   Date Value Ref Range Status   06/14/2022 4.7 3.5 - 5.1 mmol/L Final     Chloride   Date Value Ref Range Status   06/14/2022 107 95 - 110 mmol/L Final     CO2   Date Value Ref Range Status   06/14/2022 22 (L) 23 - 29 mmol/L Final     Glucose   Date Value Ref Range Status   06/14/2022 111 (H) 70 - 110 mg/dL Final     BUN   Date Value Ref Range Status   06/14/2022 12 6 - 20 mg/dL Final     Creatinine   Date Value Ref Range Status   06/14/2022 1.1 0.5 - 1.4 mg/dL Final     Calcium   Date Value Ref Range Status   06/14/2022 9.0 8.7 - 10.5 mg/dL Final     Total Protein   Date Value Ref Range Status   06/03/2022 7.8 6.0 - 8.4 g/dL Final      Albumin   Date Value Ref Range Status   11/02/2022 5.0 (H) 3.8 - 4.9 g/dL Final   07/23/2020 4.5 3.6 - 5.1 g/dL Final     Comment:     For additional information, please refer to   http://education.[employer]/faq/GMD034 (This link is   being provided for informational/ educational purposes only.)  This test was developed and its analytical performance   characteristics have been determined by [facility]. It has not been cleared or approved by the   [place] Food and Drug Administration. This assay has been validated   pursuant to the CLIA regulations and is used for clinical   purposes.  @ Test Performed By:  [facility]  Jonathan Landers M.D., Ph.D.,   [address] [place] [id number]  [facility]  [id number]       Total Bilirubin   Date Value Ref Range Status   06/03/2022 0.9 0.1 - 1.0 mg/dL Final     Comment:     For infants and newborns, interpretation of results should be based  on gestational age, weight and in agreement with clinical  observations.    Premature Infant recommended reference ranges:  Up to 24 hours.............<8.0 mg/dL  Up to 48 hours............<12.0 mg/dL  3-5 days..................<15.0 mg/dL  6-29 days.................<15.0 mg/dL       Alkaline Phosphatase   Date Value Ref Range Status   06/03/2022 58 55 - 135 U/L Final     AST   Date Value Ref Range Status   06/03/2022 17 10 - 40 U/L Final     ALT   Date Value Ref Range Status   06/03/2022 21 10 - 44 U/L Final     Anion Gap   Date Value Ref Range Status   06/14/2022 10 8 - 16 mmol/L Final       Xrays        Impression/Plan    Problem List Items Addressed This Visit          Pulmonary    COPD (chronic obstructive pulmonary disease), severe     Continue present medications.  Will refill medications as needed.  Instructed patient to contact us with any issues concerning their medications (cost, reactions, etc.).  Have discussed with patient about  inciting conditions which may exacerbate their disease.  We did discuss possible new therapies or de-escalation of therapy (if appropriate).  Asked patient if they were interested in pursuing pulmonary rehabilitation.  All questions answered  RTC 3 months  Patient instructed that they are to call if symptoms change or new issues develop prior to their next visit.              Other    History of smoking, quit 4/2013, 50 pack-years     Has stopped smoking  Will follow                      Asa Morin MD

## 2023-08-14 RX ORDER — BUDESONIDE, GLYCOPYRROLATE, AND FORMOTEROL FUMARATE 160; 9; 4.8 UG/1; UG/1; UG/1
AEROSOL, METERED RESPIRATORY (INHALATION)
Qty: 10.7 G | Refills: 11 | Status: SHIPPED | OUTPATIENT
Start: 2023-08-14 | End: 2024-03-06

## 2023-08-16 DIAGNOSIS — F41.9 ANXIETY: Primary | ICD-10-CM

## 2023-08-17 ENCOUNTER — CLINICAL SUPPORT (OUTPATIENT)
Dept: SMOKING CESSATION | Facility: CLINIC | Age: 58
End: 2023-08-17

## 2023-08-17 DIAGNOSIS — F17.200 NICOTINE DEPENDENCE: Primary | ICD-10-CM

## 2023-08-17 PROCEDURE — 99999 PR PBB SHADOW E&M-EST. PATIENT-LVL I: ICD-10-PCS | Mod: PBBFAC,,,

## 2023-08-17 PROCEDURE — 99407 BEHAV CHNG SMOKING > 10 MIN: CPT | Mod: S$GLB,,, | Performed by: GENERAL PRACTICE

## 2023-08-17 PROCEDURE — 99407 PR TOBACCO USE CESSATION INTENSIVE >10 MINUTES: ICD-10-PCS | Mod: S$GLB,,, | Performed by: GENERAL PRACTICE

## 2023-08-17 PROCEDURE — 99999 PR PBB SHADOW E&M-EST. PATIENT-LVL I: CPT | Mod: PBBFAC,,,

## 2023-08-17 RX ORDER — ALPRAZOLAM 1 MG/1
1 TABLET ORAL 2 TIMES DAILY
Qty: 60 TABLET | Refills: 0 | Status: SHIPPED | OUTPATIENT
Start: 2023-08-17 | End: 2023-09-01

## 2023-08-17 NOTE — PROGRESS NOTES
Spoke with patient today in regard to smoking cessation progress 12 month telephone follow up, he states that he is not tobacco free.  Commended patient on the accomplishments thus far.  Informed patient of benefit period, future follow up, and contact information if any further help or support is needed.  Counselor scheduled [facility] appointment.  Will complete smart form for 12 month follow up on Quit attempt #1 and resolve episode.

## 2023-08-21 DIAGNOSIS — F41.9 ANXIETY: Primary | ICD-10-CM

## 2023-08-22 RX ORDER — ALPRAZOLAM 1 MG/1
1 TABLET ORAL 2 TIMES DAILY
Qty: 60 TABLET | Refills: 0 | Status: SHIPPED | OUTPATIENT
Start: 2023-08-22 | End: 2023-09-01

## 2023-08-28 ENCOUNTER — TELEPHONE (OUTPATIENT)
Dept: PULMONOLOGY | Facility: CLINIC | Age: 58
End: 2023-08-28

## 2023-08-28 DIAGNOSIS — F41.9 ANXIETY: ICD-10-CM

## 2023-08-28 NOTE — TELEPHONE ENCOUNTER
Patient called an said that he picked up his xanax but on the instruction it says take one tablet bid he normally has take one tablet 3x a day . Please see if we can call in the rest so the last week of his medication he wont run out .

## 2023-09-01 RX ORDER — ALPRAZOLAM 1 MG/1
1 TABLET ORAL 3 TIMES DAILY PRN
Qty: 90 TABLET | Refills: 0 | Status: SHIPPED | OUTPATIENT
Start: 2023-09-01 | End: 2023-09-19 | Stop reason: SDUPTHER

## 2023-09-05 NOTE — TELEPHONE ENCOUNTER
We sent him a 90 day supply of xanax to [facility] he said on the directions it says take 2 a day an he normally takes 3 a day . Is it ok for him to take 3 a day ?

## 2023-09-14 ENCOUNTER — TELEPHONE (OUTPATIENT)
Dept: PULMONOLOGY | Facility: CLINIC | Age: 58
End: 2023-09-14

## 2023-09-14 DIAGNOSIS — F41.9 ANXIETY: ICD-10-CM

## 2023-09-14 NOTE — TELEPHONE ENCOUNTER
Patient got prescription filled on 08/22/2023 they only filled 60 for bid . We told him to take 3x a day like always . He is now about to be finished an he will need a new rx sent to [facility]  for a 90 day supply on his xanax so they can fill for next month .

## 2023-09-19 RX ORDER — ALPRAZOLAM 1 MG/1
1 TABLET ORAL 3 TIMES DAILY PRN
Qty: 270 TABLET | Refills: 1 | Status: SHIPPED | OUTPATIENT
Start: 2023-09-19 | End: 2023-11-29 | Stop reason: SDUPTHER

## 2023-11-08 RX ORDER — BUSPIRONE HYDROCHLORIDE 10 MG/1
10 TABLET ORAL
COMMUNITY
Start: 2023-08-21

## 2023-11-08 RX ORDER — ROSUVASTATIN CALCIUM 10 MG/1
10 TABLET, COATED ORAL
COMMUNITY
Start: 2023-08-21

## 2023-11-09 ENCOUNTER — OFFICE VISIT (OUTPATIENT)
Dept: PULMONOLOGY | Facility: CLINIC | Age: 58
End: 2023-11-09
Payer: MEDICARE

## 2023-11-09 ENCOUNTER — TELEPHONE (OUTPATIENT)
Dept: PULMONOLOGY | Facility: CLINIC | Age: 58
End: 2023-11-09

## 2023-11-09 VITALS
SYSTOLIC BLOOD PRESSURE: 122 MMHG | DIASTOLIC BLOOD PRESSURE: 80 MMHG | BODY MASS INDEX: 29.13 KG/M2 | WEIGHT: 186 LBS | OXYGEN SATURATION: 98 % | HEART RATE: 73 BPM

## 2023-11-09 DIAGNOSIS — F41.9 ANXIETY: ICD-10-CM

## 2023-11-09 DIAGNOSIS — J44.9 CHRONIC OBSTRUCTIVE PULMONARY DISEASE, UNSPECIFIED COPD TYPE: ICD-10-CM

## 2023-11-09 DIAGNOSIS — Z87.891 HISTORY OF SMOKING: ICD-10-CM

## 2023-11-09 PROCEDURE — 99214 OFFICE O/P EST MOD 30 MIN: CPT | Mod: S$GLB,,, | Performed by: INTERNAL MEDICINE

## 2023-11-09 PROCEDURE — 3074F PR MOST RECENT SYSTOLIC BLOOD PRESSURE < 130 MM HG: ICD-10-PCS | Mod: CPTII,S$GLB,, | Performed by: INTERNAL MEDICINE

## 2023-11-09 PROCEDURE — 1159F PR MEDICATION LIST DOCUMENTED IN MEDICAL RECORD: ICD-10-PCS | Mod: CPTII,S$GLB,, | Performed by: INTERNAL MEDICINE

## 2023-11-09 PROCEDURE — 99214 PR OFFICE/OUTPT VISIT, EST, LEVL IV, 30-39 MIN: ICD-10-PCS | Mod: S$GLB,,, | Performed by: INTERNAL MEDICINE

## 2023-11-09 PROCEDURE — 3079F PR MOST RECENT DIASTOLIC BLOOD PRESSURE 80-89 MM HG: ICD-10-PCS | Mod: CPTII,S$GLB,, | Performed by: INTERNAL MEDICINE

## 2023-11-09 PROCEDURE — 3008F BODY MASS INDEX DOCD: CPT | Mod: CPTII,S$GLB,, | Performed by: INTERNAL MEDICINE

## 2023-11-09 PROCEDURE — 3008F PR BODY MASS INDEX (BMI) DOCUMENTED: ICD-10-PCS | Mod: CPTII,S$GLB,, | Performed by: INTERNAL MEDICINE

## 2023-11-09 PROCEDURE — 3079F DIAST BP 80-89 MM HG: CPT | Mod: CPTII,S$GLB,, | Performed by: INTERNAL MEDICINE

## 2023-11-09 PROCEDURE — 3074F SYST BP LT 130 MM HG: CPT | Mod: CPTII,S$GLB,, | Performed by: INTERNAL MEDICINE

## 2023-11-09 PROCEDURE — 1160F RVW MEDS BY RX/DR IN RCRD: CPT | Mod: CPTII,S$GLB,, | Performed by: INTERNAL MEDICINE

## 2023-11-09 PROCEDURE — 1160F PR REVIEW ALL MEDS BY PRESCRIBER/CLIN PHARMACIST DOCUMENTED: ICD-10-PCS | Mod: CPTII,S$GLB,, | Performed by: INTERNAL MEDICINE

## 2023-11-09 PROCEDURE — 1159F MED LIST DOCD IN RCRD: CPT | Mod: CPTII,S$GLB,, | Performed by: INTERNAL MEDICINE

## 2023-11-09 RX ORDER — CHLORHEXIDINE GLUCONATE ORAL RINSE 1.2 MG/ML
SOLUTION DENTAL
COMMUNITY
Start: 2023-10-10

## 2023-11-09 RX ORDER — DEXAMETHASONE 4 MG/1
TABLET ORAL
COMMUNITY
Start: 2023-10-10

## 2023-11-09 RX ORDER — IBUPROFEN 800 MG/1
800 TABLET ORAL
COMMUNITY
Start: 2023-10-10

## 2023-11-09 NOTE — PROGRESS NOTES
Office Visit Note *    Patient Name: Erick Graves Jr.  MRN: [id number]  : 1965      Reason for visit: COPD    HPI:     2021 - 57 yo male has been a pt of Dr Ibanez with COPD (stage IV C), + smoking (quit 2019 about 80 pack years) who is here to establish care.  He has been on BREZTRI and prn NATHAN and is doing well.  He does have issues with anxiety (we discussed this).  He does have a small periumbilical hernia.  Has seen Dr Rao for transplant evaluation in 2019.  Last CT scan that I can find is 2019 - nothing to be concerned about.  Waiting on records from Dr Ibanez's office.  Did reviewed some limited records from Dr Ibanez's office.      2021 - Here for follow up, Pt is currently stable on present medications with no recent increases in their symptoms or use of rescue medications.  Since our last visit there have been no hospitalizations or ER visits for their respiratory issues and there does not seem to be anything to suggest unrecognized exacerbations.  I have reviewed the medical regimen and re-educated the pt on the role of rescue and controlling medications.  Inhaler technique and understanding seems adequate.  The patient reports no issues with any of there medications for their COPD.  Refills will be taken care of as needed.  All questions answered.  Has a probable small periumbilical hernia that he wants evaluated.   Reports that he had Covid a few months ago but did not get very sick.    10/6/2021 - Here for follow up, Pt is currently stable on present medications with no recent increases in their symptoms or use of rescue medications.  Since our last visit there have been no hospitalizations or ER visits for their respiratory issues and there does not seem to be anything to suggest unrecognized exacerbations.  I have reviewed the medical regimen and re-educated the pt on the role of rescue and controlling medications.  Inhaler technique and understanding seems adequate.  The  patient reports no issues with any of there medications for their COPD.  Refills will be taken care of as needed.  All questions answered.    2/3/2022 - Here for follow, Pt is currently stable on present medications with no recent increases in their symptoms or use of rescue medications.  Since our last visit there have been no hospitalizations or ER visits for their respiratory issues and there does not seem to be anything to suggest unrecognized exacerbations.  I have reviewed the medical regimen and re-educated the pt on the role of rescue and controlling medications.  Inhaler technique and understanding seems adequate.  The patient reports no issues with any of there medications for their COPD.  Refills will be taken care of as needed.  All questions answered.  Patient has no known corona virus exposures and has been practicing social distancing.  We have discussed the virus and precautions and all questions have been answered.  He did have Covid during the first wave.  Has had 1 Covid vaccine but got very sick with it.    5/5/2022 - Here for follow up, Pt is currently stable on present medications with no recent increases in their symptoms or use of rescue medications.  Since our last visit there have been no hospitalizations or ER visits for their respiratory issues and there does not seem to be anything to suggest unrecognized exacerbations.  I have reviewed the medical regimen and re-educated the pt on the role of rescue and controlling medications.  Inhaler technique and understanding seems adequate.  The patient reports no issues with any of there medications for their COPD.  Refills will be taken care of as needed.  All questions answered.  Has had some increase in SOB, CARUSO and congestion but has not been taking BREZTRI regularly due to donut hole issues (we gave samples).  Patient has no known corona virus exposures and has been practicing social distancing.  We have discussed the virus and precautions  and all questions have been answered.  When he was smoking he smoked 1-1.5 PPD for about 35 years.    8/3/2022 - Here for follow up, Pt is currently stable on present medications with no recent increases in their symptoms or use of rescue medications.  Since our last visit there have been no hospitalizations or ER visits for their respiratory issues and there does not seem to be anything to suggest unrecognized exacerbations.  I have reviewed the medical regimen and re-educated the pt on the role of rescue and controlling medications.  Inhaler technique and understanding seems adequate.  The patient reports no issues with any of there medications for their COPD.  Refills will be taken care of as needed.  All questions answered.  Out of Breztri for a few days and some increased SOB.  Has had hernia surgery and did well.  Patient has no known corona virus exposures and has been practicing social distancing.  We have discussed the virus and precautions and all questions have been answered.    11/1/2022 - Here for follow up, Pt is currently stable on present medications with no recent increases in their symptoms or use of rescue medications.  Since our last visit there have been no hospitalizations or ER visits for their respiratory issues and there does not seem to be anything to suggest unrecognized exacerbations.  I have reviewed the medical regimen and re-educated the pt on the role of rescue and controlling medications.  Inhaler technique and understanding seems adequate.  The patient reports no issues with any of there medications for their COPD.  Refills will be taken care of as needed.  All questions answered.  He does feel that he may be more symptomatic with exertion.  He has gained a little weight as well. He is interested in getting back into exercise and wants a referral to pulmonary rehab.  Does not want to get the flu shot.    2/2/2023 - Here for follow up, he has run out of inhalers and is having trouble  affording any and unfortunately due to administrative decisions we do not have any samples.  He has noted some increased dyspnea since running out of meds.  Still not smoking and using nicotine lozenges.      5/4/2023 - Here for follow up, Pt is currently stable on present medications with no recent increases in their symptoms or use of rescue medications.  Since our last visit there have been no hospitalizations or ER visits for their respiratory issues and there does not seem to be anything to suggest unrecognized exacerbations.  I have reviewed the medical regimen and re-educated the pt on the role of rescue and controlling medications.  Inhaler technique and understanding seems adequate.  The patient reports no issues with any of there medications for their COPD.  Refills will be taken care of as needed.  All questions answered.  Still with issues regarding cost of meds.  Still not smoking.  He is to have right wrist surgery next week    8/3/2023 - Here for follow up, Pt is currently stable on present medications with no recent increases in their symptoms or use of rescue medications.  Since our last visit there have been no hospitalizations or ER visits for their respiratory issues and there does not seem to be anything to suggest unrecognized exacerbations.  I have reviewed the medical regimen and re-educated the pt on the role of rescue and controlling medications.  Inhaler technique and understanding seems adequate.  The patient reports no issues with any of there medications for their COPD.  Refills will be taken care of as needed.  All questions answered.  He has felt a little light headed at times.      11/9/2023 - Here for folow up, Pt is currently stable on present medications with no recent increases in their symptoms or use of rescue medications.  Since our last visit there have been no hospitalizations or ER visits for their respiratory issues and there does not seem to be anything to suggest  unrecognized exacerbations.  I have reviewed the medical regimen and re-educated the pt on the role of rescue and controlling medications.  Inhaler technique and understanding seems adequate.  The patient reports no issues with any of there medications for their COPD.  Refills will be taken care of as needed.  All questions answered.  No new issues reported.      COPD Flowsheet    GOLD III D   Last PFT - 8/2021  FEV1- 37 % DLCO - 64 %       +ICS/LABA/LAMA    + prn NATHAN    mMRC -  0 - SOB with strenuous exercise   -  1 - SOB level ground, slight hill   -  2 - SOB walk slower or stop for breath level ground   - + 3 - SOB at 100 yards or after few minutes   -  4 - SOB in house, dressing    Referral to PULMONARY REHABILITATION - yes          Low Dose Screening CT Chest    Cigarettes - 1-1.5 PPD x 35 YEARS  Still smoking -  NO (quit 2022)    Date of last LD CT - 5/2023    Result - emphysema, small nodules    I have discussed with pt about using a screening CT of the chest due to history of cigarette smoking.  We have discussed the possible findings and possible actions as a result of these findings.  The pt would like to proceed.    Past Medical History    Past Medical History:   Diagnosis Date    COPD (chronic obstructive pulmonary disease)     Hepatitis     As a child turned jaundice    Hypertension     Peptic ulcer disease     Wears dentures     upper       Past Surgical History     Past Surgical History:   Procedure Laterality Date    CYST REMOVAL      BY TESTICAL    ROBOT-ASSISTED LAPAROSCOPIC REPAIR OF VENTRAL HERNIA N/A 6/17/2022    Procedure: ROBOTIC REPAIR, HERNIA, VENTRAL;  Surgeon: Phillip Chao III, MD;  Location: [facility];  Service: General;  Laterality: N/A;       Medications      Current Outpatient Medications:     albuterol (PROVENTIL/VENTOLIN HFA) 90 mcg/actuation inhaler, Inhale 2 puffs into the lungs every 6 (six) hours as needed for Shortness of Breath. Rescue (Patient not taking: Reported on  2023), Disp: 18 g, Rfl: 11    ALPRAZolam (XANAX) 1 MG tablet, Take 1 tablet (1 mg total) by mouth 3 (three) times daily as needed for Anxiety., Disp: 270 tablet, Rfl: 1    aspirin (ECOTRIN) 81 MG EC tablet, Every day, Disp: , Rfl:     atenoloL (TENORMIN) 25 MG tablet, TAKE 1 TABLET BY MOUTH ONCE A DAY, Disp: 30 tablet, Rfl: 11    atenoloL (TENORMIN) 25 MG tablet, Take 1 tablet (25 mg total) by mouth once daily., Disp: 30 tablet, Rfl: 11    budesonide-glycopyr-formoterol (BREZTRI AEROSPHERE) 160-9-4.8 mcg/actuation HFAA, INHALE 2 PUFFS INTO THE LUNGS TWICE A DAY, Disp: 10.7 g, Rfl: 11    busPIRone (BUSPAR) 10 MG tablet, Take 10 mg by mouth., Disp: , Rfl:     chlorhexidine (PERIDEX) 0.12 % solution, SMARTSI.5 Ounce(s) By Mouth Twice Daily, Disp: , Rfl:     dexAMETHasone (DECADRON) 4 MG Tab, Take by mouth., Disp: , Rfl:     HYDROcodone-acetaminophen (NORCO) 5-325 mg per tablet, Take 1 tablet by mouth every 6 (six) hours as needed for Pain. (Patient not taking: Reported on 2023), Disp: 25 tablet, Rfl: 0    ibuprofen (ADVIL,MOTRIN) 800 MG tablet, Take 800 mg by mouth., Disp: , Rfl:     meloxicam (MOBIC) 15 MG tablet, Take 1 tablet (15 mg total) by mouth once daily. (Patient not taking: Reported on 2023), Disp: 14 tablet, Rfl: 0    metFORMIN (GLUCOPHAGE-XR) 500 MG ER 24hr tablet, , Disp: , Rfl:     nicotine (NICODERM CQ) 21 mg/24 hr, Place 1 patch onto the skin once daily. (Patient not taking: Reported on 2023), Disp: 28 patch, Rfl: 0    nicotine polacrilex 2 MG Lozg, Take 1 lozenge (2 mg total) by mouth as needed (Use in place of cigarettes)., Disp: 168 lozenge, Rfl: 0    rosuvastatin (CRESTOR) 10 MG tablet, Take 10 mg by mouth., Disp: , Rfl:     Allergies    Review of patient's allergies indicates:   Allergen Reactions    Codeine      Other reaction(s): Unknown patient states he never had an allergic reaction    Crestor [rosuvastatin] Other (See Comments)     Stomach soreness    Lipitor  [atorvastatin] Other (See Comments)     Stomach soreness      Penicillins      Other reaction(s): Unknown       SocHx    Social History     Tobacco Use   Smoking Status Every Day    Current packs/day: 0.00    Average packs/day: 0.5 packs/day for 34.0 years (17.0 ttl pk-yrs)    Types: Cigarettes    Start date: 1988    Last attempt to quit: 2022    Years since quittin.1   Smokeless Tobacco Never   Tobacco Comments    OCC CIGARETTE       Social History     Substance and Sexual Activity   Alcohol Use Yes    Comment: occasional       Drug Use - no  Occupation -  work x 30 years - retired/disabled  Asbestos exposure - no  Pets - dog    FMHx    History reviewed. No pertinent family history.      Review of Systems  Review of Systems   Constitutional:  Positive for malaise/fatigue. Negative for chills, diaphoresis, fever and weight loss.   HENT:  Negative for congestion.    Eyes:  Negative for pain.   Respiratory:  Positive for cough (chronic) and shortness of breath. Negative for hemoptysis, sputum production, wheezing and stridor.    Cardiovascular:  Negative for chest pain, palpitations, orthopnea, claudication, leg swelling and PND.   Gastrointestinal:  Negative for abdominal pain, blood in stool, constipation, diarrhea, heartburn, melena, nausea and vomiting.   Genitourinary:  Negative for dysuria, frequency, hematuria and urgency.   Musculoskeletal:  Negative for falls and myalgias.   Neurological:  Negative for dizziness, tingling, tremors, sensory change, speech change, focal weakness, seizures, loss of consciousness, weakness and headaches.   Psychiatric/Behavioral:  Negative for depression, substance abuse and suicidal ideas. The patient is nervous/anxious.        Physical Exam    Vitals:    23 1039   BP: 122/80   BP Location: Left arm   Patient Position: Sitting   BP Method: Medium (Manual)   Pulse: 73   SpO2: 98%   Weight: 84.4 kg (186 lb)       Physical Exam  Vitals and nursing note  reviewed.   Constitutional:       General: He is not in acute distress.     Appearance: Normal appearance. He is well-developed. He is not ill-appearing, toxic-appearing or diaphoretic.   HENT:      Head: Normocephalic and atraumatic.      Right Ear: External ear normal.      Left Ear: External ear normal.      Nose: Nose normal.   Eyes:      General: No scleral icterus.        Right eye: No discharge.         Left eye: No discharge.      Extraocular Movements: Extraocular movements intact.      Conjunctiva/sclera: Conjunctivae normal.      Pupils: Pupils are equal, round, and reactive to light.   Neck:      Thyroid: No thyromegaly.      Vascular: No JVD.      Trachea: No tracheal deviation.   Cardiovascular:      Rate and Rhythm: Normal rate and regular rhythm.      Heart sounds: Normal heart sounds. No murmur heard.     No friction rub. No gallop.   Pulmonary:      Effort: Pulmonary effort is normal. No respiratory distress.      Breath sounds: Normal breath sounds. No stridor. No wheezing, rhonchi or rales.      Comments: Decreased BS throughout  No acc m use  Chest:      Chest wall: No tenderness.   Abdominal:      General: Bowel sounds are normal. There is no distension.      Palpations: Abdomen is soft.      Comments: Small periumbilical hernia   Musculoskeletal:         General: No tenderness. Normal range of motion.      Cervical back: Normal range of motion and neck supple. No rigidity or tenderness.      Right lower leg: No edema.      Left lower leg: No edema.   Lymphadenopathy:      Cervical: No cervical adenopathy.   Skin:     General: Skin is warm and dry.      Capillary Refill: Capillary refill takes less than 2 seconds.   Neurological:      General: No focal deficit present.      Mental Status: He is alert and oriented to person, place, and time. Mental status is at baseline.      Cranial Nerves: No cranial nerve deficit.      Motor: No weakness.      Gait: Gait normal.      Deep Tendon Reflexes:  Reflexes are normal and symmetric.   Psychiatric:         Mood and Affect: Mood normal.         Behavior: Behavior normal.         Thought Content: Thought content normal.         Judgment: Judgment normal.         Labs    Lab Results   Component Value Date    WBC 6.77 06/14/2022    HGB 14.6 06/14/2022    HCT 43.0 06/14/2022     06/14/2022       Sodium   Date Value Ref Range Status   06/14/2022 139 136 - 145 mmol/L Final     Potassium   Date Value Ref Range Status   06/14/2022 4.7 3.5 - 5.1 mmol/L Final     Chloride   Date Value Ref Range Status   06/14/2022 107 95 - 110 mmol/L Final     CO2   Date Value Ref Range Status   06/14/2022 22 (L) 23 - 29 mmol/L Final     Glucose   Date Value Ref Range Status   06/14/2022 111 (H) 70 - 110 mg/dL Final     BUN   Date Value Ref Range Status   06/14/2022 12 6 - 20 mg/dL Final     Creatinine   Date Value Ref Range Status   06/14/2022 1.1 0.5 - 1.4 mg/dL Final     Calcium   Date Value Ref Range Status   06/14/2022 9.0 8.7 - 10.5 mg/dL Final     Total Protein   Date Value Ref Range Status   06/03/2022 7.8 6.0 - 8.4 g/dL Final     Albumin   Date Value Ref Range Status   11/02/2022 5.0 (H) 3.8 - 4.9 g/dL Final   07/23/2020 4.5 3.6 - 5.1 g/dL Final     Comment:     For additional information, please refer to   http://education.[employer]/faq/EEE489 (This link is   being provided for informational/ educational purposes only.)  This test was developed and its analytical performance   characteristics have been determined by [facility]. It has not been cleared or approved by the   [place] Food and Drug Administration. This assay has been validated   pursuant to the CLIA regulations and is used for clinical   purposes.  @ Test Performed By:  "[employer]"  Jonathan Landers M.D., Ph.D.,   [address] [place] [id number]  CLIA  [id number]       Total Bilirubin   Date Value Ref Range Status    06/03/2022 0.9 0.1 - 1.0 mg/dL Final     Comment:     For infants and newborns, interpretation of results should be based  on gestational age, weight and in agreement with clinical  observations.    Premature Infant recommended reference ranges:  Up to 24 hours.............<8.0 mg/dL  Up to 48 hours............<12.0 mg/dL  3-5 days..................<15.0 mg/dL  6-29 days.................<15.0 mg/dL       Alkaline Phosphatase   Date Value Ref Range Status   06/03/2022 58 55 - 135 U/L Final     AST   Date Value Ref Range Status   06/03/2022 17 10 - 40 U/L Final     ALT   Date Value Ref Range Status   06/03/2022 21 10 - 44 U/L Final     Anion Gap   Date Value Ref Range Status   06/14/2022 10 8 - 16 mmol/L Final       Xrays        Impression/Plan    Problem List Items Addressed This Visit          Pulmonary    COPD (chronic obstructive pulmonary disease), severe     Continue present medications.  Will refill medications as needed.  Instructed patient to contact us with any issues concerning their medications (cost, reactions, etc.).  Have discussed with patient about inciting conditions which may exacerbate their disease.  We did discuss possible new therapies or de-escalation of therapy (if appropriate).  Asked patient if they were interested in pursuing pulmonary rehabilitation.  All questions answered  RTC 3 months  Patient instructed that they are to call if symptoms change or new issues develop prior to their next visit.  Will consider NIPPV but we discussed and he does not think he would tolerate wearing the mask               Other    History of smoking, quit 4/2013, 50 pack-years     Aware                       Asa Morin MD

## 2023-11-09 NOTE — TELEPHONE ENCOUNTER
He says he picked up his xanax at the pharmacy an they only gave him 2 a day he normally takes 3 a day can we do another prescription

## 2023-11-09 NOTE — ASSESSMENT & PLAN NOTE
 Continue present medications.   Will refill medications as needed.   Instructed patient to contact us with any issues concerning their medications (cost, reactions, etc.).   Have discussed with patient about inciting conditions which may exacerbate their disease.   We did discuss possible new therapies or de-escalation of therapy (if appropriate).   Asked patient if they were interested in pursuing pulmonary rehabilitation.   All questions answered   RTC 3 months   Patient instructed that they are to call if symptoms change or new issues develop prior to their next visit.   Will consider NIPPV but we discussed and he does not think he would tolerate wearing the mask

## 2023-11-29 ENCOUNTER — TELEPHONE (OUTPATIENT)
Dept: PULMONOLOGY | Facility: CLINIC | Age: 58
End: 2023-11-29

## 2023-11-29 DIAGNOSIS — F41.9 ANXIETY: ICD-10-CM

## 2023-11-29 RX ORDER — ALPRAZOLAM 1 MG/1
1 TABLET ORAL 3 TIMES DAILY PRN
Qty: 270 TABLET | Refills: 1 | Status: SHIPPED | OUTPATIENT
Start: 2023-11-29 | End: 2024-03-06 | Stop reason: SDUPTHER

## 2023-12-04 ENCOUNTER — TELEPHONE (OUTPATIENT)
Dept: PULMONOLOGY | Facility: CLINIC | Age: 58
End: 2023-12-04

## 2023-12-04 RX ORDER — ALBUTEROL SULFATE 90 UG/1
2 AEROSOL, METERED RESPIRATORY (INHALATION) EVERY 6 HOURS PRN
Qty: 18 G | Refills: 11 | Status: SHIPPED | OUTPATIENT
Start: 2023-12-04

## 2023-12-04 NOTE — TELEPHONE ENCOUNTER
Patient says is out of his daily inhaler an the pharmacy says he is not due yet an can't fill due to insurance . He wants to know if he can get a rescue inhaler sent to [facility] so he will have something until he can get his inhaler again .

## 2024-02-08 RX ORDER — ATENOLOL 25 MG/1
TABLET ORAL
Qty: 30 TABLET | Refills: 11 | Status: SHIPPED | OUTPATIENT
Start: 2024-02-08

## 2024-03-05 RX ORDER — FLUTICASONE FUROATE, UMECLIDINIUM BROMIDE AND VILANTEROL TRIFENATATE 200; 62.5; 25 UG/1; UG/1; UG/1
1 POWDER RESPIRATORY (INHALATION)
COMMUNITY
Start: 2023-12-05 | End: 2024-03-06 | Stop reason: SDUPTHER

## 2024-03-05 RX ORDER — PREDNISONE 20 MG/1
40 TABLET ORAL
COMMUNITY
Start: 2023-12-05

## 2024-03-05 RX ORDER — IPRATROPIUM BROMIDE AND ALBUTEROL SULFATE 2.5; .5 MG/3ML; MG/3ML
SOLUTION RESPIRATORY (INHALATION) EVERY 6 HOURS PRN
COMMUNITY
Start: 2024-01-10

## 2024-03-06 ENCOUNTER — OFFICE VISIT (OUTPATIENT)
Dept: PULMONOLOGY | Facility: CLINIC | Age: 59
End: 2024-03-06
Payer: MEDICARE

## 2024-03-06 ENCOUNTER — TELEPHONE (OUTPATIENT)
Dept: PULMONOLOGY | Facility: CLINIC | Age: 59
End: 2024-03-06

## 2024-03-06 VITALS
WEIGHT: 190.69 LBS | DIASTOLIC BLOOD PRESSURE: 82 MMHG | OXYGEN SATURATION: 96 % | BODY MASS INDEX: 29.87 KG/M2 | SYSTOLIC BLOOD PRESSURE: 130 MMHG | HEART RATE: 71 BPM

## 2024-03-06 DIAGNOSIS — Z87.891 HISTORY OF SMOKING: ICD-10-CM

## 2024-03-06 DIAGNOSIS — F41.9 ANXIETY: ICD-10-CM

## 2024-03-06 DIAGNOSIS — J44.9 CHRONIC OBSTRUCTIVE PULMONARY DISEASE, UNSPECIFIED COPD TYPE: Primary | ICD-10-CM

## 2024-03-06 PROCEDURE — 99214 OFFICE O/P EST MOD 30 MIN: CPT | Mod: S$GLB,,, | Performed by: INTERNAL MEDICINE

## 2024-03-06 PROCEDURE — 1160F RVW MEDS BY RX/DR IN RCRD: CPT | Mod: CPTII,S$GLB,, | Performed by: INTERNAL MEDICINE

## 2024-03-06 PROCEDURE — 1159F MED LIST DOCD IN RCRD: CPT | Mod: CPTII,S$GLB,, | Performed by: INTERNAL MEDICINE

## 2024-03-06 PROCEDURE — 3079F DIAST BP 80-89 MM HG: CPT | Mod: CPTII,S$GLB,, | Performed by: INTERNAL MEDICINE

## 2024-03-06 PROCEDURE — 3075F SYST BP GE 130 - 139MM HG: CPT | Mod: CPTII,S$GLB,, | Performed by: INTERNAL MEDICINE

## 2024-03-06 PROCEDURE — 3008F BODY MASS INDEX DOCD: CPT | Mod: CPTII,S$GLB,, | Performed by: INTERNAL MEDICINE

## 2024-03-06 RX ORDER — ALPRAZOLAM 1 MG/1
1 TABLET ORAL 3 TIMES DAILY PRN
Qty: 270 TABLET | Refills: 1 | Status: SHIPPED | OUTPATIENT
Start: 2024-03-06

## 2024-03-06 RX ORDER — FLUTICASONE FUROATE, UMECLIDINIUM BROMIDE AND VILANTEROL TRIFENATATE 200; 62.5; 25 UG/1; UG/1; UG/1
1 POWDER RESPIRATORY (INHALATION) DAILY
Qty: 1 EACH | Refills: 11 | Status: SHIPPED | OUTPATIENT
Start: 2024-03-06

## 2024-03-06 NOTE — PROGRESS NOTES
Office Visit Note *    Patient Name: Erick Graves Jr.  MRN: [id number]  : 1965      Reason for visit: COPD    HPI:     2021 - 57 yo male has been a pt of Dr Ibanez with COPD (stage IV C), + smoking (quit 2019 about 80 pack years) who is here to establish care.  He has been on BREZTRI and prn NATHAN and is doing well.  He does have issues with anxiety (we discussed this).  He does have a small periumbilical hernia.  Has seen Dr Rao for transplant evaluation in 2019.  Last CT scan that I can find is 2019 - nothing to be concerned about.  Waiting on records from Dr Ibanez's office.  Did reviewed some limited records from Dr Ibanez's office.      2021 - Here for follow up, Pt is currently stable on present medications with no recent increases in their symptoms or use of rescue medications.  Since our last visit there have been no hospitalizations or ER visits for their respiratory issues and there does not seem to be anything to suggest unrecognized exacerbations.  I have reviewed the medical regimen and re-educated the pt on the role of rescue and controlling medications.  Inhaler technique and understanding seems adequate.  The patient reports no issues with any of there medications for their COPD.  Refills will be taken care of as needed.  All questions answered.  Has a probable small periumbilical hernia that he wants evaluated.   Reports that he had Covid a few months ago but did not get very sick.    10/6/2021 - Here for follow up, Pt is currently stable on present medications with no recent increases in their symptoms or use of rescue medications.  Since our last visit there have been no hospitalizations or ER visits for their respiratory issues and there does not seem to be anything to suggest unrecognized exacerbations.  I have reviewed the medical regimen and re-educated the pt on the role of rescue and controlling medications.  Inhaler technique and understanding seems adequate.  The  patient reports no issues with any of there medications for their COPD.  Refills will be taken care of as needed.  All questions answered.    2/3/2022 - Here for follow, Pt is currently stable on present medications with no recent increases in their symptoms or use of rescue medications.  Since our last visit there have been no hospitalizations or ER visits for their respiratory issues and there does not seem to be anything to suggest unrecognized exacerbations.  I have reviewed the medical regimen and re-educated the pt on the role of rescue and controlling medications.  Inhaler technique and understanding seems adequate.  The patient reports no issues with any of there medications for their COPD.  Refills will be taken care of as needed.  All questions answered.  Patient has no known corona virus exposures and has been practicing social distancing.  We have discussed the virus and precautions and all questions have been answered.  He did have Covid during the first wave.  Has had 1 Covid vaccine but got very sick with it.    5/5/2022 - Here for follow up, Pt is currently stable on present medications with no recent increases in their symptoms or use of rescue medications.  Since our last visit there have been no hospitalizations or ER visits for their respiratory issues and there does not seem to be anything to suggest unrecognized exacerbations.  I have reviewed the medical regimen and re-educated the pt on the role of rescue and controlling medications.  Inhaler technique and understanding seems adequate.  The patient reports no issues with any of there medications for their COPD.  Refills will be taken care of as needed.  All questions answered.  Has had some increase in SOB, CARUSO and congestion but has not been taking BREZTRI regularly due to donut hole issues (we gave samples).  Patient has no known corona virus exposures and has been practicing social distancing.  We have discussed the virus and precautions  and all questions have been answered.  When he was smoking he smoked 1-1.5 PPD for about 35 years.    8/3/2022 - Here for follow up, Pt is currently stable on present medications with no recent increases in their symptoms or use of rescue medications.  Since our last visit there have been no hospitalizations or ER visits for their respiratory issues and there does not seem to be anything to suggest unrecognized exacerbations.  I have reviewed the medical regimen and re-educated the pt on the role of rescue and controlling medications.  Inhaler technique and understanding seems adequate.  The patient reports no issues with any of there medications for their COPD.  Refills will be taken care of as needed.  All questions answered.  Out of Breztri for a few days and some increased SOB.  Has had hernia surgery and did well.  Patient has no known corona virus exposures and has been practicing social distancing.  We have discussed the virus and precautions and all questions have been answered.    11/1/2022 - Here for follow up, Pt is currently stable on present medications with no recent increases in their symptoms or use of rescue medications.  Since our last visit there have been no hospitalizations or ER visits for their respiratory issues and there does not seem to be anything to suggest unrecognized exacerbations.  I have reviewed the medical regimen and re-educated the pt on the role of rescue and controlling medications.  Inhaler technique and understanding seems adequate.  The patient reports no issues with any of there medications for their COPD.  Refills will be taken care of as needed.  All questions answered.  He does feel that he may be more symptomatic with exertion.  He has gained a little weight as well. He is interested in getting back into exercise and wants a referral to pulmonary rehab.  Does not want to get the flu shot.    2/2/2023 - Here for follow up, he has run out of inhalers and is having trouble  affording any and unfortunately due to administrative decisions we do not have any samples.  He has noted some increased dyspnea since running out of meds.  Still not smoking and using nicotine lozenges.      5/4/2023 - Here for follow up, Pt is currently stable on present medications with no recent increases in their symptoms or use of rescue medications.  Since our last visit there have been no hospitalizations or ER visits for their respiratory issues and there does not seem to be anything to suggest unrecognized exacerbations.  I have reviewed the medical regimen and re-educated the pt on the role of rescue and controlling medications.  Inhaler technique and understanding seems adequate.  The patient reports no issues with any of there medications for their COPD.  Refills will be taken care of as needed.  All questions answered.  Still with issues regarding cost of meds.  Still not smoking.  He is to have right wrist surgery next week    8/3/2023 - Here for follow up, Pt is currently stable on present medications with no recent increases in their symptoms or use of rescue medications.  Since our last visit there have been no hospitalizations or ER visits for their respiratory issues and there does not seem to be anything to suggest unrecognized exacerbations.  I have reviewed the medical regimen and re-educated the pt on the role of rescue and controlling medications.  Inhaler technique and understanding seems adequate.  The patient reports no issues with any of there medications for their COPD.  Refills will be taken care of as needed.  All questions answered.  He has felt a little light headed at times.      11/9/2023 - Here for folow up, Pt is currently stable on present medications with no recent increases in their symptoms or use of rescue medications.  Since our last visit there have been no hospitalizations or ER visits for their respiratory issues and there does not seem to be anything to suggest  unrecognized exacerbations.  I have reviewed the medical regimen and re-educated the pt on the role of rescue and controlling medications.  Inhaler technique and understanding seems adequate.  The patient reports no issues with any of there medications for their COPD.  Refills will be taken care of as needed.  All questions answered.  No new issues reported.      3/6/2024 - Here for follow up, Pt is currently stable on present medications with no recent increases in their symptoms or use of rescue medications.  Since our last visit there have been no hospitalizations or ER visits for their respiratory issues and there does not seem to be anything to suggest unrecognized exacerbations.  I have reviewed the medical regimen and re-educated the pt on the role of rescue and controlling medications.  Inhaler technique and understanding seems adequate.  The patient reports no issues with any of there medications for their COPD.  Refills will be taken care of as needed.  All questions answered.  He did have some issues when there was a delay of getting his meds and that got settled.  We discussed vaccines annd he does not tolerate flu or Covid vaccines.    COPD Flowsheet    GOLD III D   Last PFT - 8/2021  FEV1- 37 % DLCO - 64 %       +ICS/LABA/LAMA    + prn NATHAN    mMRC -  0 - SOB with strenuous exercise   -  1 - SOB level ground, slight hill   -  2 - SOB walk slower or stop for breath level ground   - + 3 - SOB at 100 yards or after few minutes   -  4 - SOB in house, dressing    Referral to PULMONARY REHABILITATION - yes          Low Dose Screening CT Chest    Cigarettes - 1-1.5 PPD x 35 YEARS  Still smoking -  NO (quit 2022)    Date of last LD CT - 5/2023    Result - emphysema, small nodules, needs repeat 5/24    I have discussed with pt about using a screening CT of the chest due to history of cigarette smoking.  We have discussed the possible findings and possible actions as a result of these findings.  The pt would  like to proceed.    Past Medical History    Past Medical History:   Diagnosis Date    COPD (chronic obstructive pulmonary disease)     Hepatitis     As a child turned jaundice    Hypertension     Peptic ulcer disease     Wears dentures     upper       Past Surgical History     Past Surgical History:   Procedure Laterality Date    CYST REMOVAL      BY TESTICAL    ROBOT-ASSISTED LAPAROSCOPIC REPAIR OF VENTRAL HERNIA N/A 2022    Procedure: ROBOTIC REPAIR, HERNIA, VENTRAL;  Surgeon: Phillip Chao III, MD;  Location: [facility];  Service: General;  Laterality: N/A;       Medications      Current Outpatient Medications:     albuterol (PROVENTIL/VENTOLIN HFA) 90 mcg/actuation inhaler, Inhale 2 puffs into the lungs every 6 (six) hours as needed for Shortness of Breath. Rescue, Disp: 18 g, Rfl: 11    albuterol-ipratropium (DUO-NEB) 2.5 mg-0.5 mg/3 mL nebulizer solution, Take by nebulization every 6 (six) hours as needed., Disp: , Rfl:     ALPRAZolam (XANAX) 1 MG tablet, Take 1 tablet (1 mg total) by mouth 3 (three) times daily as needed for Anxiety., Disp: 270 tablet, Rfl: 1    aspirin (ECOTRIN) 81 MG EC tablet, Every day, Disp: , Rfl:     atenoloL (TENORMIN) 25 MG tablet, Take 1 tablet (25 mg total) by mouth once daily., Disp: 30 tablet, Rfl: 11    atenoloL (TENORMIN) 25 MG tablet, TAKE 1 TABLET BY MOUTH ONCE A DAY, Disp: 30 tablet, Rfl: 11    budesonide-glycopyr-formoterol (BREZTRI AEROSPHERE) 160-9-4.8 mcg/actuation HFAA, INHALE 2 PUFFS INTO THE LUNGS TWICE A DAY, Disp: 10.7 g, Rfl: 11    busPIRone (BUSPAR) 10 MG tablet, Take 10 mg by mouth., Disp: , Rfl:     chlorhexidine (PERIDEX) 0.12 % solution, SMARTSI.5 Ounce(s) By Mouth Twice Daily, Disp: , Rfl:     dexAMETHasone (DECADRON) 4 MG Tab, Take by mouth., Disp: , Rfl:     ibuprofen (ADVIL,MOTRIN) 800 MG tablet, Take 800 mg by mouth., Disp: , Rfl:     meloxicam (MOBIC) 15 MG tablet, Take 1 tablet (15 mg total) by mouth once daily., Disp: 14 tablet, Rfl: 0     metFORMIN (GLUCOPHAGE-XR) 500 MG ER 24hr tablet, , Disp: , Rfl:     nicotine (NICODERM CQ) 21 mg/24 hr, Place 1 patch onto the skin once daily., Disp: 28 patch, Rfl: 0    nicotine polacrilex 2 MG Lozg, Take 1 lozenge (2 mg total) by mouth as needed (Use in place of cigarettes)., Disp: 168 lozenge, Rfl: 0    predniSONE (DELTASONE) 20 MG tablet, Take 40 mg by mouth., Disp: , Rfl:     rosuvastatin (CRESTOR) 10 MG tablet, Take 10 mg by mouth., Disp: , Rfl:     TRELEGY ELLIPTA 200-62.5-25 mcg inhaler, Inhale 1 puff into the lungs., Disp: , Rfl:     HYDROcodone-acetaminophen (NORCO) 5-325 mg per tablet, Take 1 tablet by mouth every 6 (six) hours as needed for Pain. (Patient not taking: Reported on 2023), Disp: 25 tablet, Rfl: 0    Allergies    Review of patient's allergies indicates:   Allergen Reactions    Codeine      Other reaction(s): Unknown patient states he never had an allergic reaction    Crestor [rosuvastatin] Other (See Comments)     Stomach soreness    Lipitor [atorvastatin] Other (See Comments)     Stomach soreness      Penicillins      Other reaction(s): Unknown       SocHx    Social History     Tobacco Use   Smoking Status Every Day    Current packs/day: 0.00    Average packs/day: 0.5 packs/day for 34.0 years (17.0 ttl pk-yrs)    Types: Cigarettes    Start date: 1988    Last attempt to quit: 2022    Years since quittin.4   Smokeless Tobacco Never   Tobacco Comments    OCC CIGARETTE       Social History     Substance and Sexual Activity   Alcohol Use Yes    Comment: occasional       Drug Use - no  Occupation -  work x 30 years - retired/disabled  Asbestos exposure - no  Pets - dog    FMHx    History reviewed. No pertinent family history.      Review of Systems  Review of Systems   Constitutional:  Positive for malaise/fatigue. Negative for chills, diaphoresis, fever and weight loss.   HENT:  Negative for congestion.    Eyes:  Negative for pain.   Respiratory:  Positive for cough  (chronic) and shortness of breath. Negative for hemoptysis, sputum production, wheezing and stridor.    Cardiovascular:  Negative for chest pain, palpitations, orthopnea, claudication, leg swelling and PND.   Gastrointestinal:  Negative for abdominal pain, blood in stool, constipation, diarrhea, heartburn, melena, nausea and vomiting.   Genitourinary:  Negative for dysuria, frequency, hematuria and urgency.   Musculoskeletal:  Negative for falls and myalgias.   Neurological:  Negative for dizziness, tingling, tremors, sensory change, speech change, focal weakness, seizures, loss of consciousness, weakness and headaches.   Psychiatric/Behavioral:  Negative for depression, substance abuse and suicidal ideas. The patient is nervous/anxious.        Physical Exam    Vitals:    03/06/24 1310   BP: 130/82   BP Location: Left arm   Patient Position: Sitting   BP Method: Medium (Manual)   Pulse: 71   SpO2: 96%   Weight: 86.5 kg (190 lb 11.2 oz)       Physical Exam  Vitals and nursing note reviewed.   Constitutional:       General: He is not in acute distress.     Appearance: Normal appearance. He is well-developed. He is not ill-appearing, toxic-appearing or diaphoretic.   HENT:      Head: Normocephalic and atraumatic.      Right Ear: External ear normal.      Left Ear: External ear normal.      Nose: Nose normal.   Eyes:      General: No scleral icterus.        Right eye: No discharge.         Left eye: No discharge.      Extraocular Movements: Extraocular movements intact.      Conjunctiva/sclera: Conjunctivae normal.      Pupils: Pupils are equal, round, and reactive to light.   Neck:      Thyroid: No thyromegaly.      Vascular: No JVD.      Trachea: No tracheal deviation.   Cardiovascular:      Rate and Rhythm: Normal rate and regular rhythm.      Heart sounds: Normal heart sounds. No murmur heard.     No friction rub. No gallop.   Pulmonary:      Effort: Pulmonary effort is normal. No respiratory distress.      Breath  sounds: Normal breath sounds. No stridor. No wheezing, rhonchi or rales.      Comments: Decreased BS throughout  No acc m use  Chest:      Chest wall: No tenderness.   Abdominal:      General: Bowel sounds are normal. There is no distension.      Palpations: Abdomen is soft.      Comments: Small periumbilical hernia   Musculoskeletal:         General: No tenderness. Normal range of motion.      Cervical back: Normal range of motion and neck supple. No rigidity or tenderness.      Right lower leg: No edema.      Left lower leg: No edema.   Lymphadenopathy:      Cervical: No cervical adenopathy.   Skin:     General: Skin is warm and dry.      Capillary Refill: Capillary refill takes less than 2 seconds.   Neurological:      General: No focal deficit present.      Mental Status: He is alert and oriented to person, place, and time. Mental status is at baseline.      Cranial Nerves: No cranial nerve deficit.      Motor: No weakness.      Gait: Gait normal.      Deep Tendon Reflexes: Reflexes are normal and symmetric.   Psychiatric:         Mood and Affect: Mood normal.         Behavior: Behavior normal.         Thought Content: Thought content normal.         Judgment: Judgment normal.         Labs    Lab Results   Component Value Date    WBC 6.77 06/14/2022    HGB 14.6 06/14/2022    HCT 43.0 06/14/2022     06/14/2022       Sodium   Date Value Ref Range Status   06/14/2022 139 136 - 145 mmol/L Final     Potassium   Date Value Ref Range Status   06/14/2022 4.7 3.5 - 5.1 mmol/L Final     Chloride   Date Value Ref Range Status   06/14/2022 107 95 - 110 mmol/L Final     CO2   Date Value Ref Range Status   06/14/2022 22 (L) 23 - 29 mmol/L Final     Glucose   Date Value Ref Range Status   06/14/2022 111 (H) 70 - 110 mg/dL Final     BUN   Date Value Ref Range Status   06/14/2022 12 6 - 20 mg/dL Final     Creatinine   Date Value Ref Range Status   06/14/2022 1.1 0.5 - 1.4 mg/dL Final     Calcium   Date Value Ref Range  Status   06/14/2022 9.0 8.7 - 10.5 mg/dL Final     Total Protein   Date Value Ref Range Status   06/03/2022 7.8 6.0 - 8.4 g/dL Final     Albumin   Date Value Ref Range Status   11/02/2022 5.0 (H) 3.8 - 4.9 g/dL Final   07/23/2020 4.5 3.6 - 5.1 g/dL Final     Comment:     For additional information, please refer to   http://education.VHX/faq/GER859 (This link is   being provided for informational/ educational purposes only.)  This test was developed and its analytical performance   characteristics have been determined by [email or web address]. It has not been cleared or approved by the   [place] Food and Drug Administration. This assay has been validated   pursuant to the CLIA regulations and is used for clinical   purposes.  @ Test Performed By:  [email or web address]  Jonathan Landers M.D., Ph.D.,   [address] [place] [id number]  [facility]  [id number]       Total Bilirubin   Date Value Ref Range Status   06/03/2022 0.9 0.1 - 1.0 mg/dL Final     Comment:     For infants and newborns, interpretation of results should be based  on gestational age, weight and in agreement with clinical  observations.    Premature Infant recommended reference ranges:  Up to 24 hours.............<8.0 mg/dL  Up to 48 hours............<12.0 mg/dL  3-5 days..................<15.0 mg/dL  6-29 days.................<15.0 mg/dL       Alkaline Phosphatase   Date Value Ref Range Status   06/03/2022 58 55 - 135 U/L Final     AST   Date Value Ref Range Status   06/03/2022 17 10 - 40 U/L Final     ALT   Date Value Ref Range Status   06/03/2022 21 10 - 44 U/L Final     Anion Gap   Date Value Ref Range Status   06/14/2022 10 8 - 16 mmol/L Final       Xrays        Impression/Plan    Problem List Items Addressed This Visit          Pulmonary    COPD (chronic obstructive pulmonary disease), severe - Primary     Continue present medications.  Will refill medications as  needed.  Instructed patient to contact us with any issues concerning their medications (cost, reactions, etc.).  Have discussed with patient about inciting conditions which may exacerbate their disease.  We did discuss possible new therapies or de-escalation of therapy (if appropriate).  Asked patient if they were interested in pursuing pulmonary rehabilitation.  All questions answered  RTC 3 months  Patient instructed that they are to call if symptoms change or new issues develop prior to their next visit.  Will consider NIPPV but we discussed and he does not think he would tolerate wearing the mask               Other    History of smoking, quit 4/2013, 50 pack-years     Aware                         Asa Morin MD

## 2024-05-15 DIAGNOSIS — R10.33 UMBILICAL PAIN: Primary | ICD-10-CM

## 2024-05-18 ENCOUNTER — HOSPITAL ENCOUNTER (OUTPATIENT)
Dept: RADIOLOGY | Facility: HOSPITAL | Age: 59
Discharge: HOME | End: 2024-05-18
Attending: INTERNAL MEDICINE
Payer: MEDICARE

## 2024-05-18 DIAGNOSIS — R10.33 UMBILICAL PAIN: ICD-10-CM

## 2024-05-18 PROCEDURE — 76705 ECHO EXAM OF ABDOMEN: CPT | Mod: TC

## 2024-05-18 PROCEDURE — 76705 ECHO EXAM OF ABDOMEN: CPT | Mod: 26,,, | Performed by: RADIOLOGY

## 2024-05-27 RX ORDER — BUDESONIDE, GLYCOPYRROLATE, AND FORMOTEROL FUMARATE 160; 9; 4.8 UG/1; UG/1; UG/1
AEROSOL, METERED RESPIRATORY (INHALATION)
COMMUNITY
Start: 2024-05-13

## 2024-05-28 ENCOUNTER — OFFICE VISIT (OUTPATIENT)
Dept: PULMONOLOGY | Facility: CLINIC | Age: 59
End: 2024-05-28
Payer: MEDICARE

## 2024-05-28 ENCOUNTER — TELEPHONE (OUTPATIENT)
Dept: UROLOGY | Facility: CLINIC | Age: 59
End: 2024-05-28
Payer: MEDICARE

## 2024-05-28 VITALS
SYSTOLIC BLOOD PRESSURE: 122 MMHG | DIASTOLIC BLOOD PRESSURE: 78 MMHG | WEIGHT: 186 LBS | BODY MASS INDEX: 29.13 KG/M2 | HEART RATE: 76 BPM | OXYGEN SATURATION: 96 %

## 2024-05-28 DIAGNOSIS — R06.02 SOB (SHORTNESS OF BREATH): ICD-10-CM

## 2024-05-28 DIAGNOSIS — Z86.16 HISTORY OF COVID-19: ICD-10-CM

## 2024-05-28 DIAGNOSIS — Z87.891 HISTORY OF SMOKING: ICD-10-CM

## 2024-05-28 DIAGNOSIS — K42.9 PERIUMBILICAL HERNIA: ICD-10-CM

## 2024-05-28 DIAGNOSIS — J44.9 CHRONIC OBSTRUCTIVE PULMONARY DISEASE, UNSPECIFIED COPD TYPE: Primary | ICD-10-CM

## 2024-05-28 PROCEDURE — 1159F MED LIST DOCD IN RCRD: CPT | Mod: CPTII,S$GLB,, | Performed by: INTERNAL MEDICINE

## 2024-05-28 PROCEDURE — 3074F SYST BP LT 130 MM HG: CPT | Mod: CPTII,S$GLB,, | Performed by: INTERNAL MEDICINE

## 2024-05-28 PROCEDURE — 99214 OFFICE O/P EST MOD 30 MIN: CPT | Mod: S$GLB,,, | Performed by: INTERNAL MEDICINE

## 2024-05-28 PROCEDURE — 1160F RVW MEDS BY RX/DR IN RCRD: CPT | Mod: CPTII,S$GLB,, | Performed by: INTERNAL MEDICINE

## 2024-05-28 PROCEDURE — 3078F DIAST BP <80 MM HG: CPT | Mod: CPTII,S$GLB,, | Performed by: INTERNAL MEDICINE

## 2024-05-28 PROCEDURE — 3008F BODY MASS INDEX DOCD: CPT | Mod: CPTII,S$GLB,, | Performed by: INTERNAL MEDICINE

## 2024-05-28 NOTE — ASSESSMENT & PLAN NOTE
Continue present medications.  Will refill medications as needed.  Instructed patient to contact us with any issues concerning their medications (cost, reactions, etc.).  Have discussed with patient about inciting conditions which may exacerbate their disease.  We did discuss possible new therapies or de-escalation of therapy (if appropriate).  Asked patient if they were interested in pursuing pulmonary rehabilitation.  All questions answered  RTC 3 months  Patient instructed that they are to call if symptoms change or new issues develop prior to their next visit.  Will consider NIPPV but we discussed and he does not think he would tolerate wearing the mask

## 2024-05-28 NOTE — PROGRESS NOTES
Office Visit Note *    Patient Name: Erick Graves Jr.  MRN: [id number]  : 1965      Reason for visit: COPD    HPI:     2021 - 57 yo male has been a pt of Dr Ibanez with COPD (stage IV C), + smoking (quit 2019 about 80 pack years) who is here to establish care.  He has been on BREZTRI and prn NATHAN and is doing well.  He does have issues with anxiety (we discussed this).  He does have a small periumbilical hernia.  Has seen Dr Rao for transplant evaluation in 2019.  Last CT scan that I can find is 2019 - nothing to be concerned about.  Waiting on records from Dr Ibanez's office.  Did reviewed some limited records from Dr Ibanez's office.      2021 - Here for follow up, Pt is currently stable on present medications with no recent increases in their symptoms or use of rescue medications.  Since our last visit there have been no hospitalizations or ER visits for their respiratory issues and there does not seem to be anything to suggest unrecognized exacerbations.  I have reviewed the medical regimen and re-educated the pt on the role of rescue and controlling medications.  Inhaler technique and understanding seems adequate.  The patient reports no issues with any of there medications for their COPD.  Refills will be taken care of as needed.  All questions answered.  Has a probable small periumbilical hernia that he wants evaluated.   Reports that he had Covid a few months ago but did not get very sick.    10/6/2021 - Here for follow up, Pt is currently stable on present medications with no recent increases in their symptoms or use of rescue medications.  Since our last visit there have been no hospitalizations or ER visits for their respiratory issues and there does not seem to be anything to suggest unrecognized exacerbations.  I have reviewed the medical regimen and re-educated the pt on the role of rescue and controlling medications.  Inhaler technique and understanding seems adequate.  The  patient reports no issues with any of there medications for their COPD.  Refills will be taken care of as needed.  All questions answered.    2/3/2022 - Here for follow, Pt is currently stable on present medications with no recent increases in their symptoms or use of rescue medications.  Since our last visit there have been no hospitalizations or ER visits for their respiratory issues and there does not seem to be anything to suggest unrecognized exacerbations.  I have reviewed the medical regimen and re-educated the pt on the role of rescue and controlling medications.  Inhaler technique and understanding seems adequate.  The patient reports no issues with any of there medications for their COPD.  Refills will be taken care of as needed.  All questions answered.  Patient has no known corona virus exposures and has been practicing social distancing.  We have discussed the virus and precautions and all questions have been answered.  He did have Covid during the first wave.  Has had 1 Covid vaccine but got very sick with it.    5/5/2022 - Here for follow up, Pt is currently stable on present medications with no recent increases in their symptoms or use of rescue medications.  Since our last visit there have been no hospitalizations or ER visits for their respiratory issues and there does not seem to be anything to suggest unrecognized exacerbations.  I have reviewed the medical regimen and re-educated the pt on the role of rescue and controlling medications.  Inhaler technique and understanding seems adequate.  The patient reports no issues with any of there medications for their COPD.  Refills will be taken care of as needed.  All questions answered.  Has had some increase in SOB, CARUSO and congestion but has not been taking BREZTRI regularly due to donut hole issues (we gave samples).  Patient has no known corona virus exposures and has been practicing social distancing.  We have discussed the virus and precautions  and all questions have been answered.  When he was smoking he smoked 1-1.5 PPD for about 35 years.    8/3/2022 - Here for follow up, Pt is currently stable on present medications with no recent increases in their symptoms or use of rescue medications.  Since our last visit there have been no hospitalizations or ER visits for their respiratory issues and there does not seem to be anything to suggest unrecognized exacerbations.  I have reviewed the medical regimen and re-educated the pt on the role of rescue and controlling medications.  Inhaler technique and understanding seems adequate.  The patient reports no issues with any of there medications for their COPD.  Refills will be taken care of as needed.  All questions answered.  Out of Breztri for a few days and some increased SOB.  Has had hernia surgery and did well.  Patient has no known corona virus exposures and has been practicing social distancing.  We have discussed the virus and precautions and all questions have been answered.    11/1/2022 - Here for follow up, Pt is currently stable on present medications with no recent increases in their symptoms or use of rescue medications.  Since our last visit there have been no hospitalizations or ER visits for their respiratory issues and there does not seem to be anything to suggest unrecognized exacerbations.  I have reviewed the medical regimen and re-educated the pt on the role of rescue and controlling medications.  Inhaler technique and understanding seems adequate.  The patient reports no issues with any of there medications for their COPD.  Refills will be taken care of as needed.  All questions answered.  He does feel that he may be more symptomatic with exertion.  He has gained a little weight as well. He is interested in getting back into exercise and wants a referral to pulmonary rehab.  Does not want to get the flu shot.    2/2/2023 - Here for follow up, he has run out of inhalers and is having trouble  affording any and unfortunately due to administrative decisions we do not have any samples.  He has noted some increased dyspnea since running out of meds.  Still not smoking and using nicotine lozenges.      5/4/2023 - Here for follow up, Pt is currently stable on present medications with no recent increases in their symptoms or use of rescue medications.  Since our last visit there have been no hospitalizations or ER visits for their respiratory issues and there does not seem to be anything to suggest unrecognized exacerbations.  I have reviewed the medical regimen and re-educated the pt on the role of rescue and controlling medications.  Inhaler technique and understanding seems adequate.  The patient reports no issues with any of there medications for their COPD.  Refills will be taken care of as needed.  All questions answered.  Still with issues regarding cost of meds.  Still not smoking.  He is to have right wrist surgery next week    8/3/2023 - Here for follow up, Pt is currently stable on present medications with no recent increases in their symptoms or use of rescue medications.  Since our last visit there have been no hospitalizations or ER visits for their respiratory issues and there does not seem to be anything to suggest unrecognized exacerbations.  I have reviewed the medical regimen and re-educated the pt on the role of rescue and controlling medications.  Inhaler technique and understanding seems adequate.  The patient reports no issues with any of there medications for their COPD.  Refills will be taken care of as needed.  All questions answered.  He has felt a little light headed at times.      11/9/2023 - Here for folow up, Pt is currently stable on present medications with no recent increases in their symptoms or use of rescue medications.  Since our last visit there have been no hospitalizations or ER visits for their respiratory issues and there does not seem to be anything to suggest  unrecognized exacerbations.  I have reviewed the medical regimen and re-educated the pt on the role of rescue and controlling medications.  Inhaler technique and understanding seems adequate.  The patient reports no issues with any of there medications for their COPD.  Refills will be taken care of as needed.  All questions answered.  No new issues reported.      3/6/2024 - Here for follow up, Pt is currently stable on present medications with no recent increases in their symptoms or use of rescue medications.  Since our last visit there have been no hospitalizations or ER visits for their respiratory issues and there does not seem to be anything to suggest unrecognized exacerbations.  I have reviewed the medical regimen and re-educated the pt on the role of rescue and controlling medications.  Inhaler technique and understanding seems adequate.  The patient reports no issues with any of there medications for their COPD.  Refills will be taken care of as needed.  All questions answered.  He did have some issues when there was a delay of getting his meds and that got settled.  We discussed vaccines annd he does not tolerate flu or Covid vaccines.    5/28/2024 - Here for follow up, Pt is currently stable on present medications with no recent increases in their symptoms or use of rescue medications.  Since our last visit there have been no hospitalizations or ER visits for their respiratory issues and there does not seem to be anything to suggest unrecognized exacerbations.  I have reviewed the medical regimen and re-educated the pt on the role of rescue and controlling medications.  Inhaler technique and understanding seems adequate.  The patient reports no issues with any of there medications for their COPD.  Refills will be taken care of as needed.  All questions answered.  He works part time as a .        COPD Flowsheet    GOLD III D   Last PFT - 8/2021  FEV1- 37 % DLCO - 64 %       +ICS/LABA/LAMA    + prn  NATHAN    mMRC -  0 - SOB with strenuous exercise   -  1 - SOB level ground, slight hill   -  2 - SOB walk slower or stop for breath level ground   - + 3 - SOB at 100 yards or after few minutes   -  4 - SOB in house, dressing    Referral to PULMONARY REHABILITATION - yes          Low Dose Screening CT Chest    Cigarettes - 1-1.5 PPD x 35 YEARS  Still smoking -  NO (quit 2022)    Date of last LD CT - 5/2023    Result - emphysema, small nodules, needs repeat 5/24    I have discussed with pt about using a screening CT of the chest due to history of cigarette smoking.  We have discussed the possible findings and possible actions as a result of these findings.  The pt would like to proceed.    Past Medical History    Past Medical History:   Diagnosis Date    COPD (chronic obstructive pulmonary disease)     Hepatitis     As a child turned jaundice    Hypertension     Peptic ulcer disease     Wears dentures     upper       Past Surgical History     Past Surgical History:   Procedure Laterality Date    CYST REMOVAL      BY TESTICAL    ROBOT-ASSISTED LAPAROSCOPIC REPAIR OF VENTRAL HERNIA N/A 6/17/2022    Procedure: ROBOTIC REPAIR, HERNIA, VENTRAL;  Surgeon: Phillip Chao III, MD;  Location: [facility];  Service: General;  Laterality: N/A;       Medications      Current Outpatient Medications:     albuterol (PROVENTIL/VENTOLIN HFA) 90 mcg/actuation inhaler, Inhale 2 puffs into the lungs every 6 (six) hours as needed for Shortness of Breath. Rescue, Disp: 18 g, Rfl: 11    albuterol-ipratropium (DUO-NEB) 2.5 mg-0.5 mg/3 mL nebulizer solution, Take by nebulization every 6 (six) hours as needed., Disp: , Rfl:     ALPRAZolam (XANAX) 1 MG tablet, Take 1 tablet (1 mg total) by mouth 3 (three) times daily as needed for Anxiety., Disp: 270 tablet, Rfl: 1    aspirin (ECOTRIN) 81 MG EC tablet, Every day, Disp: , Rfl:     atenoloL (TENORMIN) 25 MG tablet, TAKE 1 TABLET BY MOUTH ONCE A DAY, Disp: 30 tablet, Rfl: 11    BREZTRI AEROSPHERE  160-9-4.8 mcg/actuation HFAA, INHALE 2 PUFFS INTO THE LUNGS TWICE A DAY, Disp: , Rfl:     busPIRone (BUSPAR) 10 MG tablet, Take 10 mg by mouth., Disp: , Rfl:     chlorhexidine (PERIDEX) 0.12 % solution, SMARTSI.5 Ounce(s) By Mouth Twice Daily, Disp: , Rfl:     dexAMETHasone (DECADRON) 4 MG Tab, Take by mouth., Disp: , Rfl:     ibuprofen (ADVIL,MOTRIN) 800 MG tablet, Take 800 mg by mouth., Disp: , Rfl:     meloxicam (MOBIC) 15 MG tablet, Take 1 tablet (15 mg total) by mouth once daily., Disp: 14 tablet, Rfl: 0    metFORMIN (GLUCOPHAGE-XR) 500 MG ER 24hr tablet, , Disp: , Rfl:     nicotine (NICODERM CQ) 21 mg/24 hr, Place 1 patch onto the skin once daily., Disp: 28 patch, Rfl: 0    nicotine polacrilex 2 MG Lozg, Take 1 lozenge (2 mg total) by mouth as needed (Use in place of cigarettes)., Disp: 168 lozenge, Rfl: 0    rosuvastatin (CRESTOR) 10 MG tablet, Take 10 mg by mouth., Disp: , Rfl:     TRELEGY ELLIPTA 200-62.5-25 mcg inhaler, Inhale 1 puff into the lungs once daily., Disp: 1 each, Rfl: 11    HYDROcodone-acetaminophen (NORCO) 5-325 mg per tablet, Take 1 tablet by mouth every 6 (six) hours as needed for Pain. (Patient not taking: Reported on 2023), Disp: 25 tablet, Rfl: 0    predniSONE (DELTASONE) 20 MG tablet, Take 40 mg by mouth. (Patient not taking: Reported on 2024), Disp: , Rfl:     Allergies    Review of patient's allergies indicates:   Allergen Reactions    Codeine      Other reaction(s): Unknown patient states he never had an allergic reaction    Crestor [rosuvastatin] Other (See Comments)     Stomach soreness    Lipitor [atorvastatin] Other (See Comments)     Stomach soreness      Penicillins      Other reaction(s): Unknown       SocHx    Social History     Tobacco Use   Smoking Status Every Day    Current packs/day: 0.00    Average packs/day: 0.5 packs/day for 34.0 years (17.0 ttl pk-yrs)    Types: Cigarettes    Start date: 1988    Last attempt to quit: 2022    Years since quitting:  1.7   Smokeless Tobacco Never   Tobacco Comments    OCC CIGARETTE       Social History     Substance and Sexual Activity   Alcohol Use Yes    Comment: occasional       Drug Use - no  Occupation -  work x 30 years - retired/disabled  Asbestos exposure - no  Pets - dog    FMHx    No family history on file.      Review of Systems  Review of Systems   Constitutional:  Positive for malaise/fatigue. Negative for chills, diaphoresis, fever and weight loss.   HENT:  Negative for congestion.    Eyes:  Negative for pain.   Respiratory:  Positive for cough (chronic) and shortness of breath. Negative for hemoptysis, sputum production, wheezing and stridor.    Cardiovascular:  Negative for chest pain, palpitations, orthopnea, claudication, leg swelling and PND.   Gastrointestinal:  Negative for abdominal pain, blood in stool, constipation, diarrhea, heartburn, melena, nausea and vomiting.   Genitourinary:  Negative for dysuria, frequency, hematuria and urgency.   Musculoskeletal:  Negative for falls and myalgias.   Neurological:  Negative for dizziness, tingling, tremors, sensory change, speech change, focal weakness, seizures, loss of consciousness, weakness and headaches.   Psychiatric/Behavioral:  Negative for depression, substance abuse and suicidal ideas. The patient is nervous/anxious.        Physical Exam    Vitals:    05/28/24 1036   BP: 122/78   BP Location: Left arm   Patient Position: Sitting   BP Method: Medium (Manual)   Pulse: 76   SpO2: 96%   Weight: 84.4 kg (186 lb)       Physical Exam  Vitals and nursing note reviewed.   Constitutional:       General: He is not in acute distress.     Appearance: Normal appearance. He is well-developed. He is not ill-appearing, toxic-appearing or diaphoretic.   HENT:      Head: Normocephalic and atraumatic.      Right Ear: External ear normal.      Left Ear: External ear normal.      Nose: Nose normal.   Eyes:      General: No scleral icterus.        Right eye: No  discharge.         Left eye: No discharge.      Extraocular Movements: Extraocular movements intact.      Conjunctiva/sclera: Conjunctivae normal.      Pupils: Pupils are equal, round, and reactive to light.   Neck:      Thyroid: No thyromegaly.      Vascular: No JVD.      Trachea: No tracheal deviation.   Cardiovascular:      Rate and Rhythm: Normal rate and regular rhythm.      Heart sounds: Normal heart sounds. No murmur heard.     No friction rub. No gallop.   Pulmonary:      Effort: Pulmonary effort is normal. No respiratory distress.      Breath sounds: Normal breath sounds. No stridor. No wheezing, rhonchi or rales.      Comments: Decreased BS throughout  No acc m use  Chest:      Chest wall: No tenderness.   Abdominal:      General: Bowel sounds are normal. There is no distension.      Palpations: Abdomen is soft.      Comments: Small periumbilical hernia   Musculoskeletal:         General: No tenderness. Normal range of motion.      Cervical back: Normal range of motion and neck supple. No rigidity or tenderness.      Right lower leg: No edema.      Left lower leg: No edema.   Lymphadenopathy:      Cervical: No cervical adenopathy.   Skin:     General: Skin is warm and dry.      Capillary Refill: Capillary refill takes less than 2 seconds.   Neurological:      General: No focal deficit present.      Mental Status: He is alert and oriented to person, place, and time. Mental status is at baseline.      Cranial Nerves: No cranial nerve deficit.      Motor: No weakness.      Gait: Gait normal.      Deep Tendon Reflexes: Reflexes are normal and symmetric.   Psychiatric:         Mood and Affect: Mood normal.         Behavior: Behavior normal.         Thought Content: Thought content normal.         Judgment: Judgment normal.         Labs    Lab Results   Component Value Date    WBC 6.77 06/14/2022    HGB 14.6 06/14/2022    HCT 43.0 06/14/2022     06/14/2022       Sodium   Date Value Ref Range Status    06/14/2022 139 136 - 145 mmol/L Final     Potassium   Date Value Ref Range Status   06/14/2022 4.7 3.5 - 5.1 mmol/L Final     Chloride   Date Value Ref Range Status   06/14/2022 107 95 - 110 mmol/L Final     CO2   Date Value Ref Range Status   06/14/2022 22 (L) 23 - 29 mmol/L Final     Glucose   Date Value Ref Range Status   06/14/2022 111 (H) 70 - 110 mg/dL Final     BUN   Date Value Ref Range Status   06/14/2022 12 6 - 20 mg/dL Final     Creatinine   Date Value Ref Range Status   06/14/2022 1.1 0.5 - 1.4 mg/dL Final     Calcium   Date Value Ref Range Status   06/14/2022 9.0 8.7 - 10.5 mg/dL Final     Total Protein   Date Value Ref Range Status   06/03/2022 7.8 6.0 - 8.4 g/dL Final     Albumin   Date Value Ref Range Status   11/02/2022 5.0 (H) 3.8 - 4.9 g/dL Final   07/23/2020 4.5 3.6 - 5.1 g/dL Final     Comment:     For additional information, please refer to   http://education.Omnigy/faq/BHY616 (This link is   being provided for informational/ educational purposes only.)  This test was developed and its analytical performance   characteristics have been determined by [facility]. It has not been cleared or approved by the   [place] Food and Drug Administration. This assay has been validated   pursuant to the CLIA regulations and is used for clinical   purposes.  @ Test Performed By:  [facility]  Jonathan Landers M.D., Ph.D.,   [address] [place] [address]  [place]  [id number]       Total Bilirubin   Date Value Ref Range Status   06/03/2022 0.9 0.1 - 1.0 mg/dL Final     Comment:     For infants and newborns, interpretation of results should be based  on gestational age, weight and in agreement with clinical  observations.    Premature Infant recommended reference ranges:  Up to 24 hours.............<8.0 mg/dL  Up to 48 hours............<12.0 mg/dL  3-5 days..................<15.0 mg/dL  6-29  days.................<15.0 mg/dL       Alkaline Phosphatase   Date Value Ref Range Status   06/03/2022 58 55 - 135 U/L Final     AST   Date Value Ref Range Status   06/03/2022 17 10 - 40 U/L Final     ALT   Date Value Ref Range Status   06/03/2022 21 10 - 44 U/L Final     Anion Gap   Date Value Ref Range Status   06/14/2022 10 8 - 16 mmol/L Final       Xrays        Impression/Plan    Problem List Items Addressed This Visit          Pulmonary    COPD (chronic obstructive pulmonary disease), severe - Primary     Continue present medications.  Will refill medications as needed.  Instructed patient to contact us with any issues concerning their medications (cost, reactions, etc.).  Have discussed with patient about inciting conditions which may exacerbate their disease.  We did discuss possible new therapies or de-escalation of therapy (if appropriate).  Asked patient if they were interested in pursuing pulmonary rehabilitation.  All questions answered  RTC 3 months  Patient instructed that they are to call if symptoms change or new issues develop prior to their next visit.  Will consider NIPPV but we discussed and he does not think he would tolerate wearing the mask            SOB (shortness of breath), functional class IV     Seems to be at baseline            ID    History of COVID-19     Aware             GI    Periumbilical hernia     Being followed            Other    History of smoking, quit 4/2013, 50 pack-years     Aware                           Asa Morin MD

## 2024-05-28 NOTE — TELEPHONE ENCOUNTER
Pre appt call   Pt voiced scheduled for having a hard time urinating   Hasn't seen any other provider since seeing Dr worrell  Confirmed appt/informed of will need urine upon arrival   Pt vu

## 2024-06-10 ENCOUNTER — OFFICE VISIT (OUTPATIENT)
Dept: UROLOGY | Facility: CLINIC | Age: 59
End: 2024-06-10
Payer: MEDICARE

## 2024-06-10 ENCOUNTER — LAB VISIT (OUTPATIENT)
Dept: LAB | Facility: HOSPITAL | Age: 59
End: 2024-06-10
Attending: NURSE PRACTITIONER
Payer: MEDICARE

## 2024-06-10 VITALS — HEIGHT: 67 IN | BODY MASS INDEX: 29.2 KG/M2 | WEIGHT: 186.06 LBS

## 2024-06-10 DIAGNOSIS — Z12.5 SCREENING FOR PROSTATE CANCER: ICD-10-CM

## 2024-06-10 DIAGNOSIS — R39.15 URINARY URGENCY: ICD-10-CM

## 2024-06-10 DIAGNOSIS — N13.8 BPH WITH OBSTRUCTION/LOWER URINARY TRACT SYMPTOMS: Primary | ICD-10-CM

## 2024-06-10 DIAGNOSIS — N52.9 ERECTILE DYSFUNCTION, UNSPECIFIED ERECTILE DYSFUNCTION TYPE: ICD-10-CM

## 2024-06-10 DIAGNOSIS — N40.1 BPH WITH OBSTRUCTION/LOWER URINARY TRACT SYMPTOMS: Primary | ICD-10-CM

## 2024-06-10 LAB
BILIRUBIN, UA POC OHS: NEGATIVE
BLOOD, UA POC OHS: NEGATIVE
CLARITY, UA POC OHS: CLEAR
COLOR, UA POC OHS: YELLOW
COMPLEXED PSA SERPL-MCNC: 0.49 NG/ML (ref 0–4)
GLUCOSE, UA POC OHS: NEGATIVE
KETONES, UA POC OHS: NEGATIVE
LEUKOCYTES, UA POC OHS: NEGATIVE
NITRITE, UA POC OHS: NEGATIVE
PH, UA POC OHS: 6
POC RESIDUAL URINE VOLUME: 48 ML (ref 0–100)
PROTEIN, UA POC OHS: NEGATIVE
SPECIFIC GRAVITY, UA POC OHS: 1.01
UROBILINOGEN, UA POC OHS: 0.2

## 2024-06-10 PROCEDURE — 1159F MED LIST DOCD IN RCRD: CPT | Mod: CPTII,S$GLB,, | Performed by: NURSE PRACTITIONER

## 2024-06-10 PROCEDURE — 1160F RVW MEDS BY RX/DR IN RCRD: CPT | Mod: CPTII,S$GLB,, | Performed by: NURSE PRACTITIONER

## 2024-06-10 PROCEDURE — 36415 COLL VENOUS BLD VENIPUNCTURE: CPT | Performed by: NURSE PRACTITIONER

## 2024-06-10 PROCEDURE — 99204 OFFICE O/P NEW MOD 45 MIN: CPT | Mod: S$GLB,,, | Performed by: NURSE PRACTITIONER

## 2024-06-10 PROCEDURE — 3008F BODY MASS INDEX DOCD: CPT | Mod: CPTII,S$GLB,, | Performed by: NURSE PRACTITIONER

## 2024-06-10 PROCEDURE — 99999 PR PBB SHADOW E&M-EST. PATIENT-LVL IV: CPT | Mod: PBBFAC,,, | Performed by: NURSE PRACTITIONER

## 2024-06-10 PROCEDURE — 51798 US URINE CAPACITY MEASURE: CPT | Mod: S$GLB,,, | Performed by: NURSE PRACTITIONER

## 2024-06-10 PROCEDURE — 84153 ASSAY OF PSA TOTAL: CPT | Performed by: NURSE PRACTITIONER

## 2024-06-10 PROCEDURE — 81003 URINALYSIS AUTO W/O SCOPE: CPT | Mod: QW,S$GLB,, | Performed by: NURSE PRACTITIONER

## 2024-06-10 RX ORDER — TAMSULOSIN HYDROCHLORIDE 0.4 MG/1
0.4 CAPSULE ORAL NIGHTLY
Qty: 30 CAPSULE | Refills: 11 | Status: SHIPPED | OUTPATIENT
Start: 2024-06-10

## 2024-06-10 RX ORDER — TADALAFIL 20 MG/1
TABLET ORAL
Qty: 30 TABLET | Refills: 0 | Status: SHIPPED | OUTPATIENT
Start: 2024-06-10

## 2024-06-10 NOTE — PATIENT INSTRUCTIONS
Conservative measures to control urgency and frequency including   1. Avoiding/minimizing bladder irritants (see below), especially in afternoon and evening hours  Discussed bladder irritants include coffee (even decaf), tea, alcohol, soda, spicy foods, acidic juices (orange, tomato), vinegar, and artificial sweeteners/sugary beverages.  2. timed voiding - empty on a schedule (approx ~2-3 hours) in spite of need to urinate, to get ahead of urge  3. dont postponing voiding - dont hold it on purpose   4. bowel regimen as distended bowel has extrinsic compressive effect on bladder.   - any or all of the following in any combination, titrate to soft daily bowel movement without pushing or straining  - colace/stool softener capsule - once to twice daily  - miralax - 1 capful daily to start, can increase to 2x daily (or decrease to 1/2 cap daily)  - increase dietary fibery  - fiber supplements, such as metamucil  - prunes, prune juice  5. INCREASE water intake  6. Stop fluids 2 hours before bed, and urinate just before bed    Erectile dysfunction:  ORAL THERAPY/PDE 5 INHIBITORS  Instructions for the Tadalafil/Cialis 20mg dose:  Cut the pill in half if you do not require the 20mg dose.   Do not take more than 1 pill in 36 hours (use as needed)  Side effects include flushing and headache     The most common adverse drug reactions reported include headache, flushing, nasal congestion, nasopharyngitis, and dyspepsia. Rare but serious reports of prolonged erections lasting more than 4 hours and priapism (painful erections lasting more than 6 hours) have been reported with PDE5 inhibitors.      The patient was instructed to not take any nitrites/nitrates after taking an oral ED med due to risk of hypotension and death.     The patient was instructed to check goodrx for cheap options for medications if insurance does not cover.     Oral Medications for Erectile Dysfunction  Prescription oral medications can be used for ED. They  often work well. But, like all medications, they can have side effects. Also, they cant be used if a man has certain health problems or takes certain other medications. Talk with your doctor about oral ED medication. Ask whether it is right for you.  Types of Oral ED Medications  There are three types of prescription oral ED medications. Each one increases blood flow to the penis. When the penis is stimulated, an erection results. The three types are:  Sildenafil citrate (Viagra)  Tadalafil (Cialis)  What Oral ED Medications Dont Do  There are some things ED medications cant do.  They dont cure the cause of your ED. Without the medication, youll still have trouble getting an erection.  They cant produce an erection without sexual stimulation.  They wont increase sexual desire. They also wont solve any other sexual issues. Psychological, emotional, or relationship issues will not be fixed.  Taking Oral ED Medications Safely  Do not combine ED medications with other treatments unless your doctor tells you to.  Dont take ED medications more often or in larger doses than prescribed.  Tell your doctor your health history. Mention all medications you take. This includes over-the-counter drugs, supplements, and herbs.  Ask your doctor about whether you can drink alcohol while taking ED medication.  Possible Side Effects of Oral ED Medications  Headache  Facial flushing  Runny or stuffy nose  Indigestion  Distortion of your color vision for a short time  Sudden vision loss or hearing loss (rare)  Risks of Oral ED Medications   Do not take ED medications if you take medications containing nitrates. The combination may drop your blood pressure to a dangerous level. Nitrates include nitroglycerin (a drug for angina). They are also in poppers, an inhaled recreational drug. If youre not sure whether youre taking nitrates, ask your doctor or pharmacist.  Medications called alpha-blockers can interact with ED  medications. They can cause a sudden drop in blood pressure. Alpha-blockers are a common treatment for prostate problems. They also treat high blood pressure. Be sure your doctor knows if you take these medications.  If youve had a heart attack or have heart disease and you have not had sex for a while, talk to your doctor. Having sex again can put extra strain on your heart. Your doctor can confirm that your heart is healthy enough for sex.  It is rare, but some men taking ED medications have had sudden vision loss. This may be more likely if other health problems are present. These include high blood pressure and diabetes. Ask your doctor if you are at risk for this type of vision loss.  In rare cases, an erection may last too long. This can badly damage the blood vessels in your penis. If an erection lasts longer than 4 hours, go to the emergency room right away.       © 9045-6637 [employer] [facility], [address] [place] [address], [place] [address]. All rights reserved. This information is not intended as a substitute for professional medical care. Always follow your healthcare professional's instructions.

## 2024-06-10 NOTE — PROGRESS NOTES
[facility]  Group: Lenard/Richard/Angeline/Candelario  NPs: Nery Yang/Mirella Mejia    Note today written by:Mirella Mejia  Date of Service: 06/10/2024    CHIEF COMPLAINT: LUTS and Erectile dysfunction    PRESENTING ILLNESS:    Erick Graves Jr. is a 59 y.o. male who presents for LUTS and erectile dysfunction. Last clinic visit was 7/16/2020 with Dr Palomino    7/16/2020  Patient with a 1 year history of severe erectile dysfunction that has been progressively worsening. He states his erections are not sufficient for sexual intercourse.      He states he has not attempted any medications for his erections. No DM. No recent drug use. States he did use cocaine, ecstasy and marijuana over 30 years ago.      Patient also reports moderately bothersome lower urinary tract symptoms. He reports urgency, weak stream, dribbling and incomplete emptying for approximately 2 years.      No gross hematuria, fever, chills, flank pain, nephrolithiasis, urinary tract infection,  trauma or history of  malignancy.     6/10/24: pt presents today for BPH/LUTS and erectile dysfunction  Pt did not take Cialis 5 mg daily after visit in 2020 with Dr Palomino  No change in symptoms since last visit.   Pt c/o urinary frequency, urgency, weak stream, and post void dribbling that has been ongoing for > 4 years. Has not tried BPH medications  Denies dysuria, gross hematuria, flank pain, fever, chills, nausea or vomiting  UA negative  PVR 48 ml    Pt c/o ED >4 years. Has not tried ED medications in the past. Denies chest pain or CV issues. Unable to have any erections on his own.  Pt takes xanax prn, atenolol, buspar  Hx of HTN and hyperlipidemia     Denies personal or family hx of bladder, renal or prostate cancer  Denies hx of kidney stones or UTIs  + everyday smoker     PSA  0.41  7/23/20  0.4                   3/13/14  1.8                   2/24/11     Testosterone  11/2/22    448  7/23/20 456    REVIEW OF SYSTEMS: as stated above in hpi    PATIENT HISTORY:    Past Medical History:   Diagnosis Date    COPD (chronic obstructive pulmonary disease)     Hepatitis     As a child turned jaundice    Hypertension     Peptic ulcer disease     Wears dentures     upper       Past Surgical History:   Procedure Laterality Date    CYST REMOVAL      BY TESTICAL    ROBOT-ASSISTED LAPAROSCOPIC REPAIR OF VENTRAL HERNIA N/A 6/17/2022    Procedure: ROBOTIC REPAIR, HERNIA, VENTRAL;  Surgeon: Phillip Chao III, MD;  Location: [facility];  Service: General;  Laterality: N/A;         PHYSICAL EXAMINATION:  Constitutional: He is oriented to person, place, and time. He appears well-developed and well-nourished.  He is in no apparent distress.  Abdominal:  He exhibits no distension.  There is no CVA tenderness.   Neurological: He is alert and oriented to person, place, and time.   Psych: Cooperative with normal affect.  Genitourinary: Declined EN    Physical Exam      LABS:      Lab Results   Component Value Date    PSA 0.41 07/23/2020    PSA 0.40 03/13/2014    PSA 1.8 02/24/2011     Lab Results   Component Value Date    CREATININE 1.1 06/14/2022         IMPRESSION:    Encounter Diagnoses   Name Primary?    BPH with obstruction/lower urinary tract symptoms Yes    Screening for prostate cancer     Erectile dysfunction, unspecified erectile dysfunction type     Urinary urgency        PLAN:  -Discussed BPH/LUTS  Pt would like to try flomax for BPH  Discussed side effects and indications for flomax. Prescription sent to the pharmacy. Pt verbalized understanding.  -Conservative measures to control urgency and frequency including   1. Avoiding/minimizing bladder irritants (see below), especially in afternoon and evening hours  Discussed bladder irritants include coffee (even decaf), tea, alcohol, soda, spicy foods, acidic juices (orange, tomato), vinegar, and artificial sweeteners/sugary beverages.  2. timed voiding -  empty on a schedule (approx ~2-3 hours) in spite of need to urinate, to get ahead of urge  3. dont postponing voiding - dont hold it on purpose   4. bowel regimen as distended bowel has extrinsic compressive effect on bladder.   - any or all of the following in any combination, titrate to soft daily bowel movement without pushing or straining  - colace/stool softener capsule - once to twice daily  - miralax - 1 capful daily to start, can increase to 2x daily (or decrease to 1/2 cap daily)  - increase dietary fibery  - fiber supplements, such as metamucil  - prunes, prune juice  5. INCREASE water intake  6. Stop fluids 2 hours before bed, and urinate just before bed    -PSA ordered and scheduled, will call with results    -We discussed ED and the contributing factors. We reviewed his personal factors that contribute to ED. Patient was educated on ED treatments. We discussed PDE-5 inhibitors, Trimix, and IPP.  Pt would like to try cialis as needed for ED  Discussed side effects and indications for cialis. Prescription sent to the pharmacy. Pt verbalized understanding.  Start with cialis 10 mg prn ED    ORAL THERAPY/PDE 5 INHIBITORS  Instructions for the Tadalafil/Cialis 20mg dose:  Cut the pill in half if you do not require the 20mg dose.   Do not take more than 1 pill in 36 hours (use as needed)  Side effects include flushing and headache     The most common adverse drug reactions reported include headache, flushing, nasal congestion, nasopharyngitis, and dyspepsia. Rare but serious reports of prolonged erections lasting more than 4 hours and priapism (painful erections lasting more than 6 hours) have been reported with PDE5 inhibitors.      The patient was instructed to not take any nitrites/nitrates after taking an oral ED med due to risk of hypotension and death.     The patient was instructed to check goodrx for cheap options for medications if insurance does not cover.     -RTC 8 weeks      I encouraged him or  any of his family members to call or email me with questions and/or concerns.      45 minutes of total time spent on the encounter, which includes face to face time and non-face to face time preparing to see the patient (eg, review of tests), Obtaining and/or reviewing separately obtained history, Documenting clinical information in the electronic or other health record, Independently interpreting results (not separately reported) and communicating results to the patient/family/caregiver, or Care coordination (not separately reported).

## 2024-07-29 DIAGNOSIS — J44.9 CHRONIC OBSTRUCTIVE PULMONARY DISEASE, UNSPECIFIED COPD TYPE: Primary | ICD-10-CM

## 2024-08-14 ENCOUNTER — OFFICE VISIT (OUTPATIENT)
Dept: UROLOGY | Facility: CLINIC | Age: 59
End: 2024-08-14
Payer: MEDICARE

## 2024-08-14 VITALS — WEIGHT: 186.06 LBS | HEIGHT: 67 IN | BODY MASS INDEX: 29.2 KG/M2

## 2024-08-14 DIAGNOSIS — N13.8 BPH WITH OBSTRUCTION/LOWER URINARY TRACT SYMPTOMS: Primary | ICD-10-CM

## 2024-08-14 DIAGNOSIS — N40.1 BPH WITH OBSTRUCTION/LOWER URINARY TRACT SYMPTOMS: Primary | ICD-10-CM

## 2024-08-14 LAB — POC RESIDUAL URINE VOLUME: 121 ML (ref 0–100)

## 2024-08-14 PROCEDURE — 1160F RVW MEDS BY RX/DR IN RCRD: CPT | Mod: CPTII,S$GLB,, | Performed by: NURSE PRACTITIONER

## 2024-08-14 PROCEDURE — 99214 OFFICE O/P EST MOD 30 MIN: CPT | Mod: S$GLB,,, | Performed by: NURSE PRACTITIONER

## 2024-08-14 PROCEDURE — 51798 US URINE CAPACITY MEASURE: CPT | Mod: S$GLB,,, | Performed by: NURSE PRACTITIONER

## 2024-08-14 PROCEDURE — G2211 COMPLEX E/M VISIT ADD ON: HCPCS | Mod: S$GLB,,, | Performed by: NURSE PRACTITIONER

## 2024-08-14 PROCEDURE — 99999 PR PBB SHADOW E&M-EST. PATIENT-LVL III: CPT | Mod: PBBFAC,,, | Performed by: NURSE PRACTITIONER

## 2024-08-14 PROCEDURE — 1159F MED LIST DOCD IN RCRD: CPT | Mod: CPTII,S$GLB,, | Performed by: NURSE PRACTITIONER

## 2024-08-14 PROCEDURE — 3008F BODY MASS INDEX DOCD: CPT | Mod: CPTII,S$GLB,, | Performed by: NURSE PRACTITIONER

## 2024-08-14 NOTE — PROGRESS NOTES
[facility]  Group: Lenard/Richard/Angeline/Candelario  NPs: Nery Yang/Mirella Mejia    Note today written by:Mirella Mejia  Date of Service: 08/14/2024    CHIEF COMPLAINT: F/u BPH and Erectile dysfunction    PRESENTING ILLNESS:    Erick Graves Jr. is a 59 y.o. male who presents for f/u BPH and erectile dysfunction. Last clinic visit was 6/10/24    7/16/2020  Patient with a 1 year history of severe erectile dysfunction that has been progressively worsening. He states his erections are not sufficient for sexual intercourse.      He states he has not attempted any medications for his erections. No DM. No recent drug use. States he did use cocaine, ecstasy and marijuana over 30 years ago.      Patient also reports moderately bothersome lower urinary tract symptoms. He reports urgency, weak stream, dribbling and incomplete emptying for approximately 2 years.      No gross hematuria, fever, chills, flank pain, nephrolithiasis, urinary tract infection,  trauma or history of  malignancy.     6/10/24: pt presents today for BPH/LUTS and erectile dysfunction  Pt did not take Cialis 5 mg daily after visit in 2020 with Dr Palomino  No change in symptoms since last visit.   Pt c/o urinary frequency, urgency, weak stream, and post void dribbling that has been ongoing for > 4 years. Has not tried BPH medications  Denies dysuria, gross hematuria, flank pain, fever, chills, nausea or vomiting  UA negative  PVR 48 ml    Pt c/o ED >4 years. Has not tried ED medications in the past. Denies chest pain or CV issues. Unable to have any erections on his own.  Pt takes xanax prn, atenolol, buspar  Hx of HTN and hyperlipidemia     Denies personal or family hx of bladder, renal or prostate cancer  Denies hx of kidney stones or UTIs  + everyday smoker    8/14/24: pt started flomax for BPH after last visit.  Pt reports stronger urinary stream and improvement to urinary frequency  and urgency. He is able to hold for >2 hours now. No longer having post void dribbling. He is pleased with how he voids on flomax. Denies dysuria, gross hematuria, flank pain, fever, chills, nausea or vomiting.  Pt did not try cialis for ED d/t cost. Did not use good rx coupon.    Bladder scan 121 ml. Last voided a few hours ago.      PSA  0.49  6/10/24  0.41  7/23/20  0.4                   3/13/14  1.8                   2/24/11     Testosterone  11/2/22   448  7/23/20 456    REVIEW OF SYSTEMS: as stated above in hpi    PATIENT HISTORY:    Past Medical History:   Diagnosis Date    COPD (chronic obstructive pulmonary disease)     Hepatitis     As a child turned jaundice    Hypertension     Peptic ulcer disease     Wears dentures     upper       Past Surgical History:   Procedure Laterality Date    CYST REMOVAL      BY TESTICAL    ROBOT-ASSISTED LAPAROSCOPIC REPAIR OF VENTRAL HERNIA N/A 6/17/2022    Procedure: ROBOTIC REPAIR, HERNIA, VENTRAL;  Surgeon: Phillip Chao III, MD;  [facility]: [facility];  Service: General;  Laterality: N/A;         PHYSICAL EXAMINATION:  Constitutional: He is oriented to person, place, and time. He appears well-developed and well-nourished.  He is in no apparent distress.  Abdominal:  He exhibits no distension.  There is no CVA tenderness.   Neurological: He is alert and oriented to person, place, and time.   Psych: Cooperative with normal affect.  Genitourinary: Declined EN    Physical Exam      LABS:      Lab Results   Component Value Date    PSA 0.49 06/10/2024    PSA 0.41 07/23/2020    PSA 0.40 03/13/2014     Lab Results   Component Value Date    CREATININE 1.1 06/14/2022         IMPRESSION:    Encounter Diagnoses   Name Primary?    BPH with obstruction/lower urinary tract symptoms Yes       PLAN:  -Continue flomax for BPH, no refills needed  -Continue conservative measures to control urgency and frequency including   1. Avoiding/minimizing bladder irritants (see below), especially in  afternoon and evening hours  Discussed bladder irritants include coffee (even decaf), tea, alcohol, soda, spicy foods, acidic juices (orange, tomato), vinegar, and artificial sweeteners/sugary beverages.  2. timed voiding - empty on a schedule (approx ~2-3 hours) in spite of need to urinate, to get ahead of urge  3. dont postponing voiding - dont hold it on purpose   4. bowel regimen as distended bowel has extrinsic compressive effect on bladder.   - any or all of the following in any combination, titrate to soft daily bowel movement without pushing or straining  - colace/stool softener capsule - once to twice daily  - miralax - 1 capful daily to start, can increase to 2x daily (or decrease to 1/2 cap daily)  - increase dietary fibery  - fiber supplements, such as metamucil  - prunes, prune juice  5. INCREASE water intake  6. Stop fluids 2 hours before bed, and urinate just before bed    -PSA stable, repeat 1 year    -RTC 1 year    I encouraged him or any of his family members to call or email me with questions and/or concerns.    Visit today is associated with current or anticipated ongoing medical care related to this patient's single serious condition/complex condition, BPH      30 minutes of total time spent on the encounter, which includes face to face time and non-face to face time preparing to see the patient (eg, review of tests), Obtaining and/or reviewing separately obtained history, Documenting clinical information in the electronic or other health record, Independently interpreting results (not separately reported) and communicating results to the patient/family/caregiver, or Care coordination (not separately reported).

## 2024-09-09 RX ORDER — FLUOXETINE 10 MG/1
1 CAPSULE ORAL
COMMUNITY
Start: 2024-08-08

## 2024-09-10 ENCOUNTER — OFFICE VISIT (OUTPATIENT)
Dept: PULMONOLOGY | Facility: CLINIC | Age: 59
End: 2024-09-10
Payer: MEDICARE

## 2024-09-10 ENCOUNTER — LAB VISIT (OUTPATIENT)
Dept: LAB | Facility: HOSPITAL | Age: 59
End: 2024-09-10
Attending: INTERNAL MEDICINE
Payer: MEDICARE

## 2024-09-10 VITALS
WEIGHT: 196 LBS | HEART RATE: 69 BPM | DIASTOLIC BLOOD PRESSURE: 78 MMHG | OXYGEN SATURATION: 98 % | SYSTOLIC BLOOD PRESSURE: 130 MMHG | BODY MASS INDEX: 30.7 KG/M2

## 2024-09-10 DIAGNOSIS — Z87.891 HISTORY OF SMOKING: Primary | ICD-10-CM

## 2024-09-10 DIAGNOSIS — R79.89 LOW TESTOSTERONE: ICD-10-CM

## 2024-09-10 DIAGNOSIS — J44.9 VANISHING LUNG: Primary | ICD-10-CM

## 2024-09-10 DIAGNOSIS — R79.89 HYPOURICEMIA: ICD-10-CM

## 2024-09-10 DIAGNOSIS — J44.9 CHRONIC OBSTRUCTIVE PULMONARY DISEASE, UNSPECIFIED COPD TYPE: ICD-10-CM

## 2024-09-10 PROCEDURE — 1160F RVW MEDS BY RX/DR IN RCRD: CPT | Mod: CPTII,S$GLB,, | Performed by: INTERNAL MEDICINE

## 2024-09-10 PROCEDURE — 3078F DIAST BP <80 MM HG: CPT | Mod: CPTII,S$GLB,, | Performed by: INTERNAL MEDICINE

## 2024-09-10 PROCEDURE — 36415 COLL VENOUS BLD VENIPUNCTURE: CPT | Performed by: INTERNAL MEDICINE

## 2024-09-10 PROCEDURE — 84402 ASSAY OF FREE TESTOSTERONE: CPT | Performed by: INTERNAL MEDICINE

## 2024-09-10 PROCEDURE — 3075F SYST BP GE 130 - 139MM HG: CPT | Mod: CPTII,S$GLB,, | Performed by: INTERNAL MEDICINE

## 2024-09-10 PROCEDURE — 99214 OFFICE O/P EST MOD 30 MIN: CPT | Mod: S$GLB,,, | Performed by: INTERNAL MEDICINE

## 2024-09-10 PROCEDURE — 84403 ASSAY OF TOTAL TESTOSTERONE: CPT | Performed by: INTERNAL MEDICINE

## 2024-09-10 PROCEDURE — 3008F BODY MASS INDEX DOCD: CPT | Mod: CPTII,S$GLB,, | Performed by: INTERNAL MEDICINE

## 2024-09-10 PROCEDURE — 1159F MED LIST DOCD IN RCRD: CPT | Mod: CPTII,S$GLB,, | Performed by: INTERNAL MEDICINE

## 2024-09-10 NOTE — PROGRESS NOTES
Office Visit Note *    Patient Name: Erick Graves Jr.  MRN: [id number]  : 1965      Reason for visit: COPD    HPI:     2021 - 57 yo male has been a pt of Dr Ibanez with COPD (stage IV C), + smoking (quit 2019 about 80 pack years) who is here to establish care.  He has been on BREZTRI and prn NATHAN and is doing well.  He does have issues with anxiety (we discussed this).  He does have a small periumbilical hernia.  Has seen Dr Rao for transplant evaluation in 2019.  Last CT scan that I can find is 2019 - nothing to be concerned about.  Waiting on records from Dr Ibanez's office.  Did reviewed some limited records from Dr Ibanez's office.      2021 - Here for follow up, Pt is currently stable on present medications with no recent increases in their symptoms or use of rescue medications.  Since our last visit there have been no hospitalizations or ER visits for their respiratory issues and there does not seem to be anything to suggest unrecognized exacerbations.  I have reviewed the medical regimen and re-educated the pt on the role of rescue and controlling medications.  Inhaler technique and understanding seems adequate.  The patient reports no issues with any of there medications for their COPD.  Refills will be taken care of as needed.  All questions answered.  Has a probable small periumbilical hernia that he wants evaluated.   Reports that he had Covid a few months ago but did not get very sick.    10/6/2021 - Here for follow up, Pt is currently stable on present medications with no recent increases in their symptoms or use of rescue medications.  Since our last visit there have been no hospitalizations or ER visits for their respiratory issues and there does not seem to be anything to suggest unrecognized exacerbations.  I have reviewed the medical regimen and re-educated the pt on the role of rescue and controlling medications.  Inhaler technique and understanding seems adequate.  The  patient reports no issues with any of there medications for their COPD.  Refills will be taken care of as needed.  All questions answered.    2/3/2022 - Here for follow, Pt is currently stable on present medications with no recent increases in their symptoms or use of rescue medications.  Since our last visit there have been no hospitalizations or ER visits for their respiratory issues and there does not seem to be anything to suggest unrecognized exacerbations.  I have reviewed the medical regimen and re-educated the pt on the role of rescue and controlling medications.  Inhaler technique and understanding seems adequate.  The patient reports no issues with any of there medications for their COPD.  Refills will be taken care of as needed.  All questions answered.  Patient has no known corona virus exposures and has been practicing social distancing.  We have discussed the virus and precautions and all questions have been answered.  He did have Covid during the first wave.  Has had 1 Covid vaccine but got very sick with it.    5/5/2022 - Here for follow up, Pt is currently stable on present medications with no recent increases in their symptoms or use of rescue medications.  Since our last visit there have been no hospitalizations or ER visits for their respiratory issues and there does not seem to be anything to suggest unrecognized exacerbations.  I have reviewed the medical regimen and re-educated the pt on the role of rescue and controlling medications.  Inhaler technique and understanding seems adequate.  The patient reports no issues with any of there medications for their COPD.  Refills will be taken care of as needed.  All questions answered.  Has had some increase in SOB, CARUSO and congestion but has not been taking BREZTRI regularly due to donut hole issues (we gave samples).  Patient has no known corona virus exposures and has been practicing social distancing.  We have discussed the virus and precautions  and all questions have been answered.  When he was smoking he smoked 1-1.5 PPD for about 35 years.    8/3/2022 - Here for follow up, Pt is currently stable on present medications with no recent increases in their symptoms or use of rescue medications.  Since our last visit there have been no hospitalizations or ER visits for their respiratory issues and there does not seem to be anything to suggest unrecognized exacerbations.  I have reviewed the medical regimen and re-educated the pt on the role of rescue and controlling medications.  Inhaler technique and understanding seems adequate.  The patient reports no issues with any of there medications for their COPD.  Refills will be taken care of as needed.  All questions answered.  Out of Breztri for a few days and some increased SOB.  Has had hernia surgery and did well.  Patient has no known corona virus exposures and has been practicing social distancing.  We have discussed the virus and precautions and all questions have been answered.    11/1/2022 - Here for follow up, Pt is currently stable on present medications with no recent increases in their symptoms or use of rescue medications.  Since our last visit there have been no hospitalizations or ER visits for their respiratory issues and there does not seem to be anything to suggest unrecognized exacerbations.  I have reviewed the medical regimen and re-educated the pt on the role of rescue and controlling medications.  Inhaler technique and understanding seems adequate.  The patient reports no issues with any of there medications for their COPD.  Refills will be taken care of as needed.  All questions answered.  He does feel that he may be more symptomatic with exertion.  He has gained a little weight as well. He is interested in getting back into exercise and wants a referral to pulmonary rehab.  Does not want to get the flu shot.    2/2/2023 - Here for follow up, he has run out of inhalers and is having trouble  affording any and unfortunately due to administrative decisions we do not have any samples.  He has noted some increased dyspnea since running out of meds.  Still not smoking and using nicotine lozenges.      5/4/2023 - Here for follow up, Pt is currently stable on present medications with no recent increases in their symptoms or use of rescue medications.  Since our last visit there have been no hospitalizations or ER visits for their respiratory issues and there does not seem to be anything to suggest unrecognized exacerbations.  I have reviewed the medical regimen and re-educated the pt on the role of rescue and controlling medications.  Inhaler technique and understanding seems adequate.  The patient reports no issues with any of there medications for their COPD.  Refills will be taken care of as needed.  All questions answered.  Still with issues regarding cost of meds.  Still not smoking.  He is to have right wrist surgery next week    8/3/2023 - Here for follow up, Pt is currently stable on present medications with no recent increases in their symptoms or use of rescue medications.  Since our last visit there have been no hospitalizations or ER visits for their respiratory issues and there does not seem to be anything to suggest unrecognized exacerbations.  I have reviewed the medical regimen and re-educated the pt on the role of rescue and controlling medications.  Inhaler technique and understanding seems adequate.  The patient reports no issues with any of there medications for their COPD.  Refills will be taken care of as needed.  All questions answered.  He has felt a little light headed at times.      11/9/2023 - Here for folow up, Pt is currently stable on present medications with no recent increases in their symptoms or use of rescue medications.  Since our last visit there have been no hospitalizations or ER visits for their respiratory issues and there does not seem to be anything to suggest  unrecognized exacerbations.  I have reviewed the medical regimen and re-educated the pt on the role of rescue and controlling medications.  Inhaler technique and understanding seems adequate.  The patient reports no issues with any of there medications for their COPD.  Refills will be taken care of as needed.  All questions answered.  No new issues reported.      3/6/2024 - Here for follow up, Pt is currently stable on present medications with no recent increases in their symptoms or use of rescue medications.  Since our last visit there have been no hospitalizations or ER visits for their respiratory issues and there does not seem to be anything to suggest unrecognized exacerbations.  I have reviewed the medical regimen and re-educated the pt on the role of rescue and controlling medications.  Inhaler technique and understanding seems adequate.  The patient reports no issues with any of there medications for their COPD.  Refills will be taken care of as needed.  All questions answered.  He did have some issues when there was a delay of getting his meds and that got settled.  We discussed vaccines annd he does not tolerate flu or Covid vaccines.    5/28/2024 - Here for follow up, Pt is currently stable on present medications with no recent increases in their symptoms or use of rescue medications.  Since our last visit there have been no hospitalizations or ER visits for their respiratory issues and there does not seem to be anything to suggest unrecognized exacerbations.  I have reviewed the medical regimen and re-educated the pt on the role of rescue and controlling medications.  Inhaler technique and understanding seems adequate.  The patient reports no issues with any of there medications for their COPD.  Refills will be taken care of as needed.  All questions answered.  He works part time as a .      9/10/2024 - Here for follow up, Pt is currently stable on present medications with no recent increases  in their symptoms or use of rescue medications.  Since our last visit there have been no hospitalizations or ER visits for their respiratory issues and there does not seem to be anything to suggest unrecognized exacerbations.  I have reviewed the medical regimen and re-educated the pt on the role of rescue and controlling medications.  Inhaler technique and understanding seems adequate.  The patient reports no issues with any of there medications for their COPD.  Refills will be taken care of as needed.  All questions answered.  He is in the donut hole and so his costs are up.  He has been started on prozac by his primary.  He has gone back to smoking after leaving his wife but is cutting back and is now at 1-2 cigarettes.  We discussed this.  He has done well with BREZTRI.      COPD Flowsheet    GOLD III D   Last PFT - 8/2021  FEV1- 37 % DLCO - 64 %       +ICS/LABA/LAMA    + prn NATHAN    mMRC -  0 - SOB with strenuous exercise   -  1 - SOB level ground, slight hill   -  2 - SOB walk slower or stop for breath level ground   - + 3 - SOB at 100 yards or after few minutes   -  4 - SOB in house, dressing    Referral to PULMONARY REHABILITATION - yes          Low Dose Screening CT Chest    Cigarettes - 1-1.5 PPD x 35 YEARS  Still smoking -  NO (quit 2022)    Date of last LD CT - 5/2023    Result - emphysema, small nodules, needs repeat now    I have discussed with pt about using a screening CT of the chest due to history of cigarette smoking.  We have discussed the possible findings and possible actions as a result of these findings.  The pt would like to proceed.    Past Medical History    Past Medical History:   Diagnosis Date    COPD (chronic obstructive pulmonary disease)     Hepatitis     As a child turned jaundice    Hypertension     Peptic ulcer disease     Wears dentures     upper       Past Surgical History     Past Surgical History:   Procedure Laterality Date    CYST REMOVAL      BY TESTICAL    ROBOT-ASSISTED  LAPAROSCOPIC REPAIR OF VENTRAL HERNIA N/A 2022    Procedure: ROBOTIC REPAIR, HERNIA, VENTRAL;  Surgeon: Phillip Chao III, MD;  Location: [facility];  Service: General;  Laterality: N/A;       Medications      Current Outpatient Medications:     albuterol (PROVENTIL/VENTOLIN HFA) 90 mcg/actuation inhaler, Inhale 2 puffs into the lungs every 6 (six) hours as needed for Shortness of Breath. Rescue, Disp: 18 g, Rfl: 11    albuterol-ipratropium (DUO-NEB) 2.5 mg-0.5 mg/3 mL nebulizer solution, Take by nebulization every 6 (six) hours as needed., Disp: , Rfl:     ALPRAZolam (XANAX) 1 MG tablet, Take 1 tablet (1 mg total) by mouth 3 (three) times daily as needed for Anxiety., Disp: 270 tablet, Rfl: 1    aspirin (ECOTRIN) 81 MG EC tablet, Every day, Disp: , Rfl:     atenoloL (TENORMIN) 25 MG tablet, TAKE 1 TABLET BY MOUTH ONCE A DAY, Disp: 30 tablet, Rfl: 11    BREZTRI AEROSPHERE 160-9-4.8 mcg/actuation HFAA, INHALE 2 PUFFS INTO THE LUNGS TWICE A DAY, Disp: , Rfl:     budesonide-glycopyr-formoterol 160-9-4.8 mcg/actuation HFAA, Inhale 2 puffs into the lungs 2 (two) times daily., Disp: 10.7 g, Rfl: 11    busPIRone (BUSPAR) 10 MG tablet, Take 10 mg by mouth., Disp: , Rfl:     chlorhexidine (PERIDEX) 0.12 % solution, SMARTSI.5 Ounce(s) By Mouth Twice Daily, Disp: , Rfl:     dexAMETHasone (DECADRON) 4 MG Tab, Take by mouth., Disp: , Rfl:     FLUoxetine 10 MG capsule, 1 capsule., Disp: , Rfl:     HYDROcodone-acetaminophen (NORCO) 5-325 mg per tablet, Take 1 tablet by mouth every 6 (six) hours as needed for Pain., Disp: 25 tablet, Rfl: 0    ibuprofen (ADVIL,MOTRIN) 800 MG tablet, Take 800 mg by mouth., Disp: , Rfl:     meloxicam (MOBIC) 15 MG tablet, Take 1 tablet (15 mg total) by mouth once daily., Disp: 14 tablet, Rfl: 0    metFORMIN (GLUCOPHAGE-XR) 500 MG ER 24hr tablet, , Disp: , Rfl:     nicotine (NICODERM CQ) 21 mg/24 hr, Place 1 patch onto the skin once daily., Disp: 28 patch, Rfl: 0    nicotine polacrilex 2 MG  Lozg, Take 1 lozenge (2 mg total) by mouth as needed (Use in place of cigarettes)., Disp: 168 lozenge, Rfl: 0    rosuvastatin (CRESTOR) 10 MG tablet, Take 10 mg by mouth., Disp: , Rfl:     tadalafiL (CIALIS) 20 MG Tab, Take 1/2 tablet (10 mg) oral prn for erectile dysfunction, Disp: 30 tablet, Rfl: 0    tamsulosin (FLOMAX) 0.4 mg Cap, Take 1 capsule (0.4 mg total) by mouth every evening., Disp: 30 capsule, Rfl: 11    TRELEGY ELLIPTA 200-62.5-25 mcg inhaler, Inhale 1 puff into the lungs once daily., Disp: 1 each, Rfl: 11    predniSONE (DELTASONE) 20 MG tablet, Take 40 mg by mouth. (Patient not taking: Reported on 9/10/2024), Disp: , Rfl:     Allergies    Review of patient's allergies indicates:   Allergen Reactions    Ciprofloxacin Other (See Comments)    Codeine      Other reaction(s): Unknown patient states he never had an allergic reaction    Crestor [rosuvastatin] Other (See Comments)     Stomach soreness    Lipitor [atorvastatin] Other (See Comments)     Stomach soreness      Penicillins      Other reaction(s): Unknown       SocHx    Social History     Tobacco Use   Smoking Status Every Day    Current packs/day: 0.00    Average packs/day: 0.5 packs/day for 34.0 years (17.0 ttl pk-yrs)    Types: Cigarettes    Start date: 1988    Last attempt to quit: 2022    Years since quittin.0   Smokeless Tobacco Never   Tobacco Comments    OCC CIGARETTE       Social History     Substance and Sexual Activity   Alcohol Use Yes    Comment: occasional       Drug Use - no  Occupation -  work x 30 years - retired/disabled  Asbestos exposure - no  Pets - dog    FMHx    No family history on file.      Review of Systems  Review of Systems   Constitutional:  Positive for malaise/fatigue. Negative for chills, diaphoresis, fever and weight loss.   HENT:  Negative for congestion.    Eyes:  Negative for pain.   Respiratory:  Positive for cough (chronic) and shortness of breath. Negative for hemoptysis, sputum  production, wheezing and stridor.    Cardiovascular:  Negative for chest pain, palpitations, orthopnea, claudication, leg swelling and PND.   Gastrointestinal:  Negative for abdominal pain, blood in stool, constipation, diarrhea, heartburn, melena, nausea and vomiting.   Genitourinary:  Negative for dysuria, frequency, hematuria and urgency.   Musculoskeletal:  Negative for falls and myalgias.   Neurological:  Negative for dizziness, tingling, tremors, sensory change, speech change, focal weakness, seizures, loss of consciousness, weakness and headaches.   Psychiatric/Behavioral:  Negative for depression, substance abuse and suicidal ideas. The patient is nervous/anxious.        Physical Exam    Vitals:    09/10/24 1201   BP: 130/78   BP Location: Left arm   Patient Position: Sitting   BP Method: Medium (Manual)   Pulse: 69   SpO2: 98%   Weight: 88.9 kg (196 lb)       Physical Exam  Vitals and nursing note reviewed.   Constitutional:       General: He is not in acute distress.     Appearance: Normal appearance. He is well-developed. He is not ill-appearing, toxic-appearing or diaphoretic.   HENT:      Head: Normocephalic and atraumatic.      Right Ear: External ear normal.      Left Ear: External ear normal.      Nose: Nose normal.   Eyes:      General: No scleral icterus.        Right eye: No discharge.         Left eye: No discharge.      Extraocular Movements: Extraocular movements intact.      Conjunctiva/sclera: Conjunctivae normal.      Pupils: Pupils are equal, round, and reactive to light.   Neck:      Thyroid: No thyromegaly.      Vascular: No JVD.      Trachea: No tracheal deviation.   Cardiovascular:      Rate and Rhythm: Normal rate and regular rhythm.      Heart sounds: Normal heart sounds. No murmur heard.     No friction rub. No gallop.   Pulmonary:      Effort: Pulmonary effort is normal. No respiratory distress.      Breath sounds: Normal breath sounds. No stridor. No wheezing, rhonchi or rales.       Comments: Decreased BS throughout  No acc m use  Chest:      Chest wall: No tenderness.   Abdominal:      General: Bowel sounds are normal. There is no distension.      Palpations: Abdomen is soft.      Comments: Small periumbilical hernia   Musculoskeletal:         General: No tenderness. Normal range of motion.      Cervical back: Normal range of motion and neck supple. No rigidity or tenderness.      Right lower leg: No edema.      Left lower leg: No edema.   Lymphadenopathy:      Cervical: No cervical adenopathy.   Skin:     General: Skin is warm and dry.      Capillary Refill: Capillary refill takes less than 2 seconds.   Neurological:      General: No focal deficit present.      Mental Status: He is alert and oriented to person, place, and time. Mental status is at baseline.      Cranial Nerves: No cranial nerve deficit.      Motor: No weakness.      Gait: Gait normal.      Deep Tendon Reflexes: Reflexes are normal and symmetric.   Psychiatric:         Mood and Affect: Mood normal.         Behavior: Behavior normal.         Thought Content: Thought content normal.         Judgment: Judgment normal.         Labs    Lab Results   Component Value Date    WBC 6.77 06/14/2022    HGB 14.6 06/14/2022    HCT 43.0 06/14/2022     06/14/2022       Sodium   Date Value Ref Range Status   06/14/2022 139 136 - 145 mmol/L Final     Potassium   Date Value Ref Range Status   06/14/2022 4.7 3.5 - 5.1 mmol/L Final     Chloride   Date Value Ref Range Status   06/14/2022 107 95 - 110 mmol/L Final     CO2   Date Value Ref Range Status   06/14/2022 22 (L) 23 - 29 mmol/L Final     Glucose   Date Value Ref Range Status   06/14/2022 111 (H) 70 - 110 mg/dL Final     BUN   Date Value Ref Range Status   06/14/2022 12 6 - 20 mg/dL Final     Creatinine   Date Value Ref Range Status   06/14/2022 1.1 0.5 - 1.4 mg/dL Final     Calcium   Date Value Ref Range Status   06/14/2022 9.0 8.7 - 10.5 mg/dL Final     Total Protein   Date Value  Ref Range Status   06/03/2022 7.8 6.0 - 8.4 g/dL Final     Albumin   Date Value Ref Range Status   11/02/2022 5.0 (H) 3.8 - 4.9 g/dL Final   07/23/2020 4.5 3.6 - 5.1 g/dL Final     Comment:     For additional information, please refer to   http://education.PayEase/faq/IOI500 (This link is   being provided for informational/ educational purposes only.)  This test was developed and its analytical performance   characteristics have been determined by [facility]. It has not been cleared or approved by the   [place] Food and Drug Administration. This assay has been validated   pursuant to the CLIA regulations and is used for clinical   purposes.  @ Test Performed By:  [facility]  Jonathan Landers M.D., Ph.D.,   [address] [place] [id number]  CLIA  [id number]       Total Bilirubin   Date Value Ref Range Status   06/03/2022 0.9 0.1 - 1.0 mg/dL Final     Comment:     For infants and newborns, interpretation of results should be based  on gestational age, weight and in agreement with clinical  observations.    Premature Infant recommended reference ranges:  Up to 24 hours.............<8.0 mg/dL  Up to 48 hours............<12.0 mg/dL  3-5 days..................<15.0 mg/dL  6-29 days.................<15.0 mg/dL       Alkaline Phosphatase   Date Value Ref Range Status   06/03/2022 58 55 - 135 U/L Final     AST   Date Value Ref Range Status   06/03/2022 17 10 - 40 U/L Final     ALT   Date Value Ref Range Status   06/03/2022 21 10 - 44 U/L Final     Anion Gap   Date Value Ref Range Status   06/14/2022 10 8 - 16 mmol/L Final       Xrays        Impression/Plan    Problem List Items Addressed This Visit          Pulmonary    COPD (chronic obstructive pulmonary disease), severe     Continue present medications.  Will refill medications as needed.  Instructed patient to contact us with any issues concerning their medications (cost,  reactions, etc.).  Have discussed with patient about inciting conditions which may exacerbate their disease.  We did discuss possible new therapies or de-escalation of therapy (if appropriate).  Asked patient if they were interested in pursuing pulmonary rehabilitation.  All questions answered  RTC 3 months  Patient instructed that they are to call if symptoms change or new issues develop prior to their next visit.  Will consider NIPPV but we discussed and he does not think he would tolerate wearing the mask            Relevant Orders    TESTOSTERONE PANEL       Other    History of smoking, quit 4/2013, 50 pack-years - Primary     Has been smoking some since breakup with his wife  We discussed this  He will work on stopping fully         Relevant Orders    CT Chest Lung Screening Low Dose     Other Visit Diagnoses       Low testosterone        Relevant Orders    TESTOSTERONE PANEL                            Asa Morin MD

## 2024-09-10 NOTE — ASSESSMENT & PLAN NOTE
Has been smoking some since breakup with his wife  We discussed this  He will work on stopping fully

## 2024-09-11 LAB — TESTOST SERPL-MCNC: 336 NG/DL (ref 264–916)

## 2024-09-15 LAB — TESTOST FREE SERPL-MCNC: 3.2 PG/ML (ref 6.6–18.1)

## 2024-09-16 ENCOUNTER — HOSPITAL ENCOUNTER (OUTPATIENT)
Dept: RADIOLOGY | Facility: HOSPITAL | Age: 59
Discharge: HOME | End: 2024-09-16
Attending: INTERNAL MEDICINE
Payer: MEDICARE

## 2024-09-16 DIAGNOSIS — Z87.891 HISTORY OF SMOKING: ICD-10-CM

## 2024-09-16 PROCEDURE — 71271 CT THORAX LUNG CANCER SCR C-: CPT | Mod: 26,,, | Performed by: RADIOLOGY

## 2024-09-16 PROCEDURE — 71271 CT THORAX LUNG CANCER SCR C-: CPT | Mod: TC

## 2024-09-23 DIAGNOSIS — F41.9 ANXIETY: Primary | ICD-10-CM

## 2024-09-23 RX ORDER — ALPRAZOLAM 1 MG/1
1 TABLET ORAL 2 TIMES DAILY
Qty: 60 TABLET | Refills: 0 | Status: SHIPPED | OUTPATIENT
Start: 2024-09-23 | End: 2024-10-23

## 2024-09-24 RX ORDER — BUDESONIDE, GLYCOPYRROLATE, AND FORMOTEROL FUMARATE 160; 9; 4.8 UG/1; UG/1; UG/1
1 AEROSOL, METERED RESPIRATORY (INHALATION) 2 TIMES DAILY
Qty: 10.7 G | Refills: 11 | Status: SHIPPED | OUTPATIENT
Start: 2024-09-24

## 2024-09-24 NOTE — TELEPHONE ENCOUNTER
Patient called said his xanax was only filled with the 60 pills needs to be 90 at 3x a day  sent to [facility]  an needs refill on [place] sent to [facility] on [address]

## 2024-10-10 ENCOUNTER — TELEPHONE (OUTPATIENT)
Dept: PULMONOLOGY | Facility: CLINIC | Age: 59
End: 2024-10-10
Payer: MEDICARE

## 2024-10-10 NOTE — TELEPHONE ENCOUNTER
Patient called needed the rest of his xanax they shorted him this last month they gave him 60 for bid he takes 270 usually for 3 x a day

## 2024-12-31 ENCOUNTER — OFFICE VISIT (OUTPATIENT)
Dept: PULMONOLOGY | Facility: CLINIC | Age: 59
End: 2024-12-31
Payer: MEDICARE

## 2024-12-31 VITALS
WEIGHT: 193.38 LBS | SYSTOLIC BLOOD PRESSURE: 122 MMHG | DIASTOLIC BLOOD PRESSURE: 78 MMHG | OXYGEN SATURATION: 97 % | BODY MASS INDEX: 30.29 KG/M2 | HEART RATE: 69 BPM

## 2024-12-31 DIAGNOSIS — R06.02 SOB (SHORTNESS OF BREATH): ICD-10-CM

## 2024-12-31 DIAGNOSIS — J44.9 CHRONIC OBSTRUCTIVE PULMONARY DISEASE, UNSPECIFIED COPD TYPE: Primary | ICD-10-CM

## 2024-12-31 DIAGNOSIS — Z87.891 HISTORY OF SMOKING: ICD-10-CM

## 2024-12-31 NOTE — PROGRESS NOTES
Office Visit Note *    Patient Name: Erick Graves Jr.  MRN: [id number]  : 1965      Reason for visit: COPD    HPI:     2021 - 57 yo male has been a pt of Dr Ibanez with COPD (stage IV C), + smoking (quit 2019 about 80 pack years) who is here to establish care.  He has been on BREZTRI and prn NATHAN and is doing well.  He does have issues with anxiety (we discussed this).  He does have a small periumbilical hernia.  Has seen Dr Rao for transplant evaluation in 2019.  Last CT scan that I can find is 2019 - nothing to be concerned about.  Waiting on records from Dr Ibanez's office.  Did reviewed some limited records from Dr Ibanez's office.      2021 - Here for follow up, Pt is currently stable on present medications with no recent increases in their symptoms or use of rescue medications.  Since our last visit there have been no hospitalizations or ER visits for their respiratory issues and there does not seem to be anything to suggest unrecognized exacerbations.  I have reviewed the medical regimen and re-educated the pt on the role of rescue and controlling medications.  Inhaler technique and understanding seems adequate.  The patient reports no issues with any of there medications for their COPD.  Refills will be taken care of as needed.  All questions answered.  Has a probable small periumbilical hernia that he wants evaluated.   Reports that he had Covid a few months ago but did not get very sick.    10/6/2021 - Here for follow up, Pt is currently stable on present medications with no recent increases in their symptoms or use of rescue medications.  Since our last visit there have been no hospitalizations or ER visits for their respiratory issues and there does not seem to be anything to suggest unrecognized exacerbations.  I have reviewed the medical regimen and re-educated the pt on the role of rescue and controlling medications.  Inhaler technique and understanding seems adequate.  The  patient reports no issues with any of there medications for their COPD.  Refills will be taken care of as needed.  All questions answered.    2/3/2022 - Here for follow, Pt is currently stable on present medications with no recent increases in their symptoms or use of rescue medications.  Since our last visit there have been no hospitalizations or ER visits for their respiratory issues and there does not seem to be anything to suggest unrecognized exacerbations.  I have reviewed the medical regimen and re-educated the pt on the role of rescue and controlling medications.  Inhaler technique and understanding seems adequate.  The patient reports no issues with any of there medications for their COPD.  Refills will be taken care of as needed.  All questions answered.  Patient has no known corona virus exposures and has been practicing social distancing.  We have discussed the virus and precautions and all questions have been answered.  He did have Covid during the first wave.  Has had 1 Covid vaccine but got very sick with it.    5/5/2022 - Here for follow up, Pt is currently stable on present medications with no recent increases in their symptoms or use of rescue medications.  Since our last visit there have been no hospitalizations or ER visits for their respiratory issues and there does not seem to be anything to suggest unrecognized exacerbations.  I have reviewed the medical regimen and re-educated the pt on the role of rescue and controlling medications.  Inhaler technique and understanding seems adequate.  The patient reports no issues with any of there medications for their COPD.  Refills will be taken care of as needed.  All questions answered.  Has had some increase in SOB, CARUSO and congestion but has not been taking BREZTRI regularly due to donut hole issues (we gave samples).  Patient has no known corona virus exposures and has been practicing social distancing.  We have discussed the virus and precautions  and all questions have been answered.  When he was smoking he smoked 1-1.5 PPD for about 35 years.    8/3/2022 - Here for follow up, Pt is currently stable on present medications with no recent increases in their symptoms or use of rescue medications.  Since our last visit there have been no hospitalizations or ER visits for their respiratory issues and there does not seem to be anything to suggest unrecognized exacerbations.  I have reviewed the medical regimen and re-educated the pt on the role of rescue and controlling medications.  Inhaler technique and understanding seems adequate.  The patient reports no issues with any of there medications for their COPD.  Refills will be taken care of as needed.  All questions answered.  Out of Breztri for a few days and some increased SOB.  Has had hernia surgery and did well.  Patient has no known corona virus exposures and has been practicing social distancing.  We have discussed the virus and precautions and all questions have been answered.    11/1/2022 - Here for follow up, Pt is currently stable on present medications with no recent increases in their symptoms or use of rescue medications.  Since our last visit there have been no hospitalizations or ER visits for their respiratory issues and there does not seem to be anything to suggest unrecognized exacerbations.  I have reviewed the medical regimen and re-educated the pt on the role of rescue and controlling medications.  Inhaler technique and understanding seems adequate.  The patient reports no issues with any of there medications for their COPD.  Refills will be taken care of as needed.  All questions answered.  He does feel that he may be more symptomatic with exertion.  He has gained a little weight as well. He is interested in getting back into exercise and wants a referral to pulmonary rehab.  Does not want to get the flu shot.    2/2/2023 - Here for follow up, he has run out of inhalers and is having trouble  affording any and unfortunately due to administrative decisions we do not have any samples.  He has noted some increased dyspnea since running out of meds.  Still not smoking and using nicotine lozenges.      5/4/2023 - Here for follow up, Pt is currently stable on present medications with no recent increases in their symptoms or use of rescue medications.  Since our last visit there have been no hospitalizations or ER visits for their respiratory issues and there does not seem to be anything to suggest unrecognized exacerbations.  I have reviewed the medical regimen and re-educated the pt on the role of rescue and controlling medications.  Inhaler technique and understanding seems adequate.  The patient reports no issues with any of there medications for their COPD.  Refills will be taken care of as needed.  All questions answered.  Still with issues regarding cost of meds.  Still not smoking.  He is to have right wrist surgery next week    8/3/2023 - Here for follow up, Pt is currently stable on present medications with no recent increases in their symptoms or use of rescue medications.  Since our last visit there have been no hospitalizations or ER visits for their respiratory issues and there does not seem to be anything to suggest unrecognized exacerbations.  I have reviewed the medical regimen and re-educated the pt on the role of rescue and controlling medications.  Inhaler technique and understanding seems adequate.  The patient reports no issues with any of there medications for their COPD.  Refills will be taken care of as needed.  All questions answered.  He has felt a little light headed at times.      11/9/2023 - Here for folow up, Pt is currently stable on present medications with no recent increases in their symptoms or use of rescue medications.  Since our last visit there have been no hospitalizations or ER visits for their respiratory issues and there does not seem to be anything to suggest  unrecognized exacerbations.  I have reviewed the medical regimen and re-educated the pt on the role of rescue and controlling medications.  Inhaler technique and understanding seems adequate.  The patient reports no issues with any of there medications for their COPD.  Refills will be taken care of as needed.  All questions answered.  No new issues reported.      3/6/2024 - Here for follow up, Pt is currently stable on present medications with no recent increases in their symptoms or use of rescue medications.  Since our last visit there have been no hospitalizations or ER visits for their respiratory issues and there does not seem to be anything to suggest unrecognized exacerbations.  I have reviewed the medical regimen and re-educated the pt on the role of rescue and controlling medications.  Inhaler technique and understanding seems adequate.  The patient reports no issues with any of there medications for their COPD.  Refills will be taken care of as needed.  All questions answered.  He did have some issues when there was a delay of getting his meds and that got settled.  We discussed vaccines annd he does not tolerate flu or Covid vaccines.    5/28/2024 - Here for follow up, Pt is currently stable on present medications with no recent increases in their symptoms or use of rescue medications.  Since our last visit there have been no hospitalizations or ER visits for their respiratory issues and there does not seem to be anything to suggest unrecognized exacerbations.  I have reviewed the medical regimen and re-educated the pt on the role of rescue and controlling medications.  Inhaler technique and understanding seems adequate.  The patient reports no issues with any of there medications for their COPD.  Refills will be taken care of as needed.  All questions answered.  He works part time as a .      9/10/2024 - Here for follow up, Pt is currently stable on present medications with no recent increases  in their symptoms or use of rescue medications.  Since our last visit there have been no hospitalizations or ER visits for their respiratory issues and there does not seem to be anything to suggest unrecognized exacerbations.  I have reviewed the medical regimen and re-educated the pt on the role of rescue and controlling medications.  Inhaler technique and understanding seems adequate.  The patient reports no issues with any of there medications for their COPD.  Refills will be taken care of as needed.  All questions answered.  He is in the donut hole and so his costs are up.  He has been started on prozac by his primary.  He has gone back to smoking after leaving his wife but is cutting back and is now at 1-2 cigarettes.  We discussed this.  He has done well with BREZTRI.    12/31/2024 - Here for follow up visit.  Patient is currently stable on present medications with no recent increases in their symptoms or use of rescue medications.  Since our last visit there have been no hospitalizations or ER visits for their respiratory issues and there does not seem to be anything to suggest unrecognized exacerbations.  I have reviewed the medical regimen and re-educated the pt on the role of rescue and controlling medications.  Inhaler technique and understanding seems adequate.  The patient reports no issues with any of there medications for their COPD.  Refills will be taken care of as needed.  All questions answered.  We have discussed potential future therapies or options as appropriate.  He feels like the prozac has been helpful.  He is still smoking occasionally but is working on quitting and we discussed this.  We discussed vaccines and he has not tolerated them in the past and he does not want to get them.      COPD Flowsheet    GOLD III D   Last PFT - 8/2021  FEV1- 37 % DLCO - 64 %       +ICS/LABA/LAMA    + prn NATHAN    mMRC -  0 - SOB with strenuous exercise   -  1 - SOB level ground, slight hill   -  2 - SOB  walk slower or stop for breath level ground   - + 3 - SOB at 100 yards or after few minutes   -  4 - SOB in house, dressing    Referral to PULMONARY REHABILITATION - yes          Low Dose Screening CT Chest    Cigarettes - 1-1.5 PPD x 35 YEARS  Still smoking -  NO (quit 2022)    Date of last LD CT - 9/2024    Result - emphysema, small nodules, needs repeat 9/2025    I have discussed with pt about using a screening CT of the chest due to history of cigarette smoking.  We have discussed the possible findings and possible actions as a result of these findings.  The pt would like to proceed.    Past Medical History    Past Medical History:   Diagnosis Date    COPD (chronic obstructive pulmonary disease)     Hepatitis     As a child turned jaundice    Hypertension     Peptic ulcer disease     Wears dentures     upper       Past Surgical History     Past Surgical History:   Procedure Laterality Date    CYST REMOVAL      BY TESTICAL    ROBOT-ASSISTED LAPAROSCOPIC REPAIR OF VENTRAL HERNIA N/A 6/17/2022    Procedure: ROBOTIC REPAIR, HERNIA, VENTRAL;  Surgeon: Phillip Chao III, MD;  [facility];  Service: General;  Laterality: N/A;       Medications      Current Outpatient Medications:     albuterol (PROVENTIL/VENTOLIN HFA) 90 mcg/actuation inhaler, Inhale 2 puffs into the lungs every 6 (six) hours as needed for Shortness of Breath. Rescue, Disp: 18 g, Rfl: 11    albuterol-ipratropium (DUO-NEB) 2.5 mg-0.5 mg/3 mL nebulizer solution, Take by nebulization every 6 (six) hours as needed., Disp: , Rfl:     ALPRAZolam (XANAX) 1 MG tablet, Take 1 tablet (1 mg total) by mouth 3 (three) times daily as needed for Anxiety., Disp: 270 tablet, Rfl: 1    aspirin (ECOTRIN) 81 MG EC tablet, Every day, Disp: , Rfl:     atenoloL (TENORMIN) 25 MG tablet, TAKE 1 TABLET BY MOUTH ONCE A DAY, Disp: 30 tablet, Rfl: 11    BREZTRI AEROSPHERE 160-9-4.8 mcg/actuation HFAA, INHALE 2 PUFFS INTO THE LUNGS TWICE A DAY, Disp: , Rfl:      budesonide-glycopyr-formoterol (BREZTRI AEROSPHERE) 160-9-4.8 mcg/actuation HFAA, Inhale 1 puff into the lungs 2 (two) times daily., Disp: 10.7 g, Rfl: 11    budesonide-glycopyr-formoterol 160-9-4.8 mcg/actuation HFAA, Inhale 2 puffs into the lungs 2 (two) times daily., Disp: 10.7 g, Rfl: 11    busPIRone (BUSPAR) 10 MG tablet, Take 10 mg by mouth., Disp: , Rfl:     chlorhexidine (PERIDEX) 0.12 % solution, SMARTSI.5 Ounce(s) By Mouth Twice Daily, Disp: , Rfl:     dexAMETHasone (DECADRON) 4 MG Tab, Take by mouth., Disp: , Rfl:     FLUoxetine 10 MG capsule, 1 capsule., Disp: , Rfl:     HYDROcodone-acetaminophen (NORCO) 5-325 mg per tablet, Take 1 tablet by mouth every 6 (six) hours as needed for Pain., Disp: 25 tablet, Rfl: 0    ibuprofen (ADVIL,MOTRIN) 800 MG tablet, Take 800 mg by mouth., Disp: , Rfl:     meloxicam (MOBIC) 15 MG tablet, Take 1 tablet (15 mg total) by mouth once daily., Disp: 14 tablet, Rfl: 0    metFORMIN (GLUCOPHAGE-XR) 500 MG ER 24hr tablet, , Disp: , Rfl:     nicotine (NICODERM CQ) 21 mg/24 hr, Place 1 patch onto the skin once daily., Disp: 28 patch, Rfl: 0    nicotine polacrilex 2 MG Lozg, Take 1 lozenge (2 mg total) by mouth as needed (Use in place of cigarettes)., Disp: 168 lozenge, Rfl: 0    rosuvastatin (CRESTOR) 10 MG tablet, Take 10 mg by mouth., Disp: , Rfl:     tadalafiL (CIALIS) 20 MG Tab, Take 1/2 tablet (10 mg) oral prn for erectile dysfunction, Disp: 30 tablet, Rfl: 0    tamsulosin (FLOMAX) 0.4 mg Cap, Take 1 capsule (0.4 mg total) by mouth every evening., Disp: 30 capsule, Rfl: 11    TRELEGY ELLIPTA 200-62.5-25 mcg inhaler, Inhale 1 puff into the lungs once daily., Disp: 1 each, Rfl: 11    predniSONE (DELTASONE) 20 MG tablet, Take 40 mg by mouth. (Patient not taking: Reported on 2024), Disp: , Rfl:     Allergies    Review of patient's allergies indicates:   Allergen Reactions    Ciprofloxacin Other (See Comments)    Codeine      Other reaction(s): Unknown patient states he  never had an allergic reaction    Crestor [rosuvastatin] Other (See Comments)     Stomach soreness    Lipitor [atorvastatin] Other (See Comments)     Stomach soreness      Penicillins      Other reaction(s): Unknown       SocHx    Social History     Tobacco Use   Smoking Status Every Day    Current packs/day: 0.00    Average packs/day: 0.5 packs/day for 34.0 years (17.0 ttl pk-yrs)    Types: Cigarettes    Start date: 1988    Last attempt to quit: 2022    Years since quittin.3   Smokeless Tobacco Never   Tobacco Comments    OCC CIGARETTE       Social History     Substance and Sexual Activity   Alcohol Use Yes    Comment: occasional       Drug Use - no  Occupation -  work x 30 years - retired/disabled  Asbestos exposure - no  Pets - dog    FMHx    No family history on file.      Review of Systems  Review of Systems   Constitutional:  Positive for malaise/fatigue. Negative for chills, diaphoresis, fever and weight loss.   HENT:  Negative for congestion.    Eyes:  Negative for pain.   Respiratory:  Positive for cough (chronic) and shortness of breath. Negative for hemoptysis, sputum production, wheezing and stridor.    Cardiovascular:  Negative for chest pain, palpitations, orthopnea, claudication, leg swelling and PND.   Gastrointestinal:  Negative for abdominal pain, blood in stool, constipation, diarrhea, heartburn, melena, nausea and vomiting.   Genitourinary:  Negative for dysuria, frequency, hematuria and urgency.   Musculoskeletal:  Negative for falls and myalgias.   Neurological:  Negative for dizziness, tingling, tremors, sensory change, speech change, focal weakness, seizures, loss of consciousness, weakness and headaches.   Psychiatric/Behavioral:  Negative for depression, substance abuse and suicidal ideas. The patient is nervous/anxious.        Physical Exam    Vitals:    24 1050   BP: 122/78   BP Location: Left arm   Patient Position: Sitting   Pulse: 69   SpO2: 97%   Weight:  87.7 kg (193 lb 6.4 oz)       Physical Exam  Vitals and nursing note reviewed.   Constitutional:       General: He is not in acute distress.     Appearance: Normal appearance. He is well-developed. He is not ill-appearing, toxic-appearing or diaphoretic.   HENT:      Head: Normocephalic and atraumatic.      Right Ear: External ear normal.      Left Ear: External ear normal.      Nose: Nose normal.   Eyes:      General: No scleral icterus.        Right eye: No discharge.         Left eye: No discharge.      Extraocular Movements: Extraocular movements intact.      Conjunctiva/sclera: Conjunctivae normal.      Pupils: Pupils are equal, round, and reactive to light.   Neck:      Thyroid: No thyromegaly.      Vascular: No JVD.      Trachea: No tracheal deviation.   Cardiovascular:      Rate and Rhythm: Normal rate and regular rhythm.      Heart sounds: Normal heart sounds. No murmur heard.     No friction rub. No gallop.   Pulmonary:      Effort: Pulmonary effort is normal. No respiratory distress.      Breath sounds: Normal breath sounds. No stridor. No wheezing, rhonchi or rales.      Comments: Decreased BS throughout  No acc m use  Chest:      Chest wall: No tenderness.   Abdominal:      General: Bowel sounds are normal. There is no distension.      Palpations: Abdomen is soft.      Comments: Small periumbilical hernia   Musculoskeletal:         General: No tenderness. Normal range of motion.      Cervical back: Normal range of motion and neck supple. No rigidity or tenderness.      Right lower leg: No edema.      Left lower leg: No edema.   Lymphadenopathy:      Cervical: No cervical adenopathy.   Skin:     General: Skin is warm and dry.      Capillary Refill: Capillary refill takes less than 2 seconds.   Neurological:      General: No focal deficit present.      Mental Status: He is alert and oriented to person, place, and time. Mental status is at baseline.      Cranial Nerves: No cranial nerve deficit.       Motor: No weakness.      Gait: Gait normal.      Deep Tendon Reflexes: Reflexes are normal and symmetric.   Psychiatric:         Mood and Affect: Mood normal.         Behavior: Behavior normal.         Thought Content: Thought content normal.         Judgment: Judgment normal.         Labs    Lab Results   Component Value Date    WBC 6.77 06/14/2022    HGB 14.6 06/14/2022    HCT 43.0 06/14/2022     06/14/2022       Sodium   Date Value Ref Range Status   06/14/2022 139 136 - 145 mmol/L Final     Potassium   Date Value Ref Range Status   06/14/2022 4.7 3.5 - 5.1 mmol/L Final     Chloride   Date Value Ref Range Status   06/14/2022 107 95 - 110 mmol/L Final     CO2   Date Value Ref Range Status   06/14/2022 22 (L) 23 - 29 mmol/L Final     Glucose   Date Value Ref Range Status   06/14/2022 111 (H) 70 - 110 mg/dL Final     BUN   Date Value Ref Range Status   06/14/2022 12 6 - 20 mg/dL Final     Creatinine   Date Value Ref Range Status   06/14/2022 1.1 0.5 - 1.4 mg/dL Final     Calcium   Date Value Ref Range Status   06/14/2022 9.0 8.7 - 10.5 mg/dL Final     Total Protein   Date Value Ref Range Status   06/03/2022 7.8 6.0 - 8.4 g/dL Final     Albumin   Date Value Ref Range Status   11/02/2022 5.0 (H) 3.8 - 4.9 g/dL Final   07/23/2020 4.5 3.6 - 5.1 g/dL Final     Comment:     For additional information, please refer to   http://education.[employer]/faq/GJM571 (This link is   being provided for informational/ educational purposes only.)  This test was developed and its analytical performance   characteristics have been determined by [facility]. It has not been cleared or approved by the   [place] Food and Drug Administration. This assay has been validated   pursuant to the CLIA regulations and is used for clinical   purposes.  @ Test Performed By:  [facility]  Jonathan Landers M.D., Ph.D.,   [address] [place] [address]  [id number]  [facility]  [id number]       Total Bilirubin   Date Value Ref Range Status   06/03/2022 0.9 0.1 - 1.0 mg/dL Final     Comment:     For infants and newborns, interpretation of results should be based  on gestational age, weight and in agreement with clinical  observations.    Premature Infant recommended reference ranges:  Up to 24 hours.............<8.0 mg/dL  Up to 48 hours............<12.0 mg/dL  3-5 days..................<15.0 mg/dL  6-29 days.................<15.0 mg/dL       Alkaline Phosphatase   Date Value Ref Range Status   06/03/2022 58 55 - 135 U/L Final     AST   Date Value Ref Range Status   06/03/2022 17 10 - 40 U/L Final     ALT   Date Value Ref Range Status   06/03/2022 21 10 - 44 U/L Final     Anion Gap   Date Value Ref Range Status   06/14/2022 10 8 - 16 mmol/L Final       Xrays        Impression/Plan    Problem List Items Addressed This Visit          Pulmonary    COPD (chronic obstructive pulmonary disease), severe - Primary     Continue present medications.  Will refill medications as needed.  Instructed patient to contact us with any issues concerning their medications (cost, reactions, etc.).  Have discussed with patient about inciting conditions which may exacerbate their disease.  We did discuss possible new therapies or de-escalation of therapy (if appropriate).  Asked patient if they were interested in pursuing pulmonary rehabilitation.  All questions answered  RTC 3 months  Patient instructed that they are to call if symptoms change or new issues develop prior to their next visit.  Will consider NIPPV but we discussed and he does not think he would tolerate wearing the mask            SOB (shortness of breath), functional class IV     Seems to be at baseline            Other    History of smoking, quit 4/2013, 50 pack-years     Has been smoking some since breakup with his wife  We discussed this  He will work on stopping fully                              Asa Morin,  MD

## 2025-01-01 RX ORDER — ALBUTEROL SULFATE 90 UG/1
INHALANT RESPIRATORY (INHALATION)
Qty: 18 G | Refills: 11 | Status: SHIPPED | OUTPATIENT
Start: 2025-01-01

## 2025-02-04 RX ORDER — ATENOLOL 25 MG/1
TABLET ORAL
Qty: 30 TABLET | Refills: 11 | Status: SHIPPED | OUTPATIENT
Start: 2025-02-04

## 2025-03-05 DIAGNOSIS — F41.9 ANXIETY: ICD-10-CM

## 2025-03-05 RX ORDER — ALPRAZOLAM 1 MG/1
1 TABLET ORAL
Qty: 270 TABLET | Refills: 1 | Status: SHIPPED | OUTPATIENT
Start: 2025-03-05

## 2025-03-31 RX ORDER — SILDENAFIL CITRATE 20 MG/1
20 TABLET ORAL DAILY PRN
COMMUNITY
Start: 2025-02-13

## 2025-04-01 ENCOUNTER — OFFICE VISIT (OUTPATIENT)
Dept: PULMONOLOGY | Facility: CLINIC | Age: 60
End: 2025-04-01
Payer: MEDICARE

## 2025-04-01 VITALS
SYSTOLIC BLOOD PRESSURE: 140 MMHG | BODY MASS INDEX: 30.74 KG/M2 | HEART RATE: 72 BPM | OXYGEN SATURATION: 97 % | WEIGHT: 196.31 LBS | DIASTOLIC BLOOD PRESSURE: 84 MMHG

## 2025-04-01 DIAGNOSIS — J44.9 CHRONIC OBSTRUCTIVE PULMONARY DISEASE, UNSPECIFIED COPD TYPE: Primary | ICD-10-CM

## 2025-04-01 DIAGNOSIS — Z87.891 HISTORY OF SMOKING: ICD-10-CM

## 2025-04-01 PROCEDURE — 3008F BODY MASS INDEX DOCD: CPT | Mod: CPTII,S$GLB,, | Performed by: INTERNAL MEDICINE

## 2025-04-01 PROCEDURE — 3077F SYST BP >= 140 MM HG: CPT | Mod: CPTII,S$GLB,, | Performed by: INTERNAL MEDICINE

## 2025-04-01 PROCEDURE — 1160F RVW MEDS BY RX/DR IN RCRD: CPT | Mod: CPTII,S$GLB,, | Performed by: INTERNAL MEDICINE

## 2025-04-01 PROCEDURE — 99214 OFFICE O/P EST MOD 30 MIN: CPT | Mod: S$GLB,,, | Performed by: INTERNAL MEDICINE

## 2025-04-01 PROCEDURE — 1159F MED LIST DOCD IN RCRD: CPT | Mod: CPTII,S$GLB,, | Performed by: INTERNAL MEDICINE

## 2025-04-01 PROCEDURE — 3079F DIAST BP 80-89 MM HG: CPT | Mod: CPTII,S$GLB,, | Performed by: INTERNAL MEDICINE

## 2025-04-01 NOTE — PROGRESS NOTES
Office Visit Note *    Patient Name: Erick Garves Jr.  MRN: [id number]  : 1965      Reason for visit: COPD    HPI:     2021 - 55 yo male has been a pt of Dr Ibanez with COPD (stage IV C), + smoking (quit 2019 about 80 pack years) who is here to establish care.  He has been on BREZTRI and prn NATHAN and is doing well.  He does have issues with anxiety (we discussed this).  He does have a small periumbilical hernia.  Has seen Dr Rao for transplant evaluation in 2019.  Last CT scan that I can find is 2019 - nothing to be concerned about.  Waiting on records from Dr Ibanez's office.  Did reviewed some limited records from Dr Ibanez's office.      2021 - Here for follow up, Pt is currently stable on present medications with no recent increases in their symptoms or use of rescue medications.  Since our last visit there have been no hospitalizations or ER visits for their respiratory issues and there does not seem to be anything to suggest unrecognized exacerbations.  I have reviewed the medical regimen and re-educated the pt on the role of rescue and controlling medications.  Inhaler technique and understanding seems adequate.  The patient reports no issues with any of there medications for their COPD.  Refills will be taken care of as needed.  All questions answered.  Has a probable small periumbilical hernia that he wants evaluated.   Reports that he had Covid a few months ago but did not get very sick.    10/6/2021 - Here for follow up, Pt is currently stable on present medications with no recent increases in their symptoms or use of rescue medications.  Since our last visit there have been no hospitalizations or ER visits for their respiratory issues and there does not seem to be anything to suggest unrecognized exacerbations.  I have reviewed the medical regimen and re-educated the pt on the role of rescue and controlling medications.  Inhaler technique and understanding seems adequate.  The  patient reports no issues with any of there medications for their COPD.  Refills will be taken care of as needed.  All questions answered.    2/3/2022 - Here for follow, Pt is currently stable on present medications with no recent increases in their symptoms or use of rescue medications.  Since our last visit there have been no hospitalizations or ER visits for their respiratory issues and there does not seem to be anything to suggest unrecognized exacerbations.  I have reviewed the medical regimen and re-educated the pt on the role of rescue and controlling medications.  Inhaler technique and understanding seems adequate.  The patient reports no issues with any of there medications for their COPD.  Refills will be taken care of as needed.  All questions answered.  Patient has no known corona virus exposures and has been practicing social distancing.  We have discussed the virus and precautions and all questions have been answered.  He did have Covid during the first wave.  Has had 1 Covid vaccine but got very sick with it.    5/5/2022 - Here for follow up, Pt is currently stable on present medications with no recent increases in their symptoms or use of rescue medications.  Since our last visit there have been no hospitalizations or ER visits for their respiratory issues and there does not seem to be anything to suggest unrecognized exacerbations.  I have reviewed the medical regimen and re-educated the pt on the role of rescue and controlling medications.  Inhaler technique and understanding seems adequate.  The patient reports no issues with any of there medications for their COPD.  Refills will be taken care of as needed.  All questions answered.  Has had some increase in SOB, CARUSO and congestion but has not been taking BREZTRI regularly due to donut hole issues (we gave samples).  Patient has no known corona virus exposures and has been practicing social distancing.  We have discussed the virus and precautions  and all questions have been answered.  When he was smoking he smoked 1-1.5 PPD for about 35 years.    8/3/2022 - Here for follow up, Pt is currently stable on present medications with no recent increases in their symptoms or use of rescue medications.  Since our last visit there have been no hospitalizations or ER visits for their respiratory issues and there does not seem to be anything to suggest unrecognized exacerbations.  I have reviewed the medical regimen and re-educated the pt on the role of rescue and controlling medications.  Inhaler technique and understanding seems adequate.  The patient reports no issues with any of there medications for their COPD.  Refills will be taken care of as needed.  All questions answered.  Out of Breztri for a few days and some increased SOB.  Has had hernia surgery and did well.  Patient has no known corona virus exposures and has been practicing social distancing.  We have discussed the virus and precautions and all questions have been answered.    11/1/2022 - Here for follow up, Pt is currently stable on present medications with no recent increases in their symptoms or use of rescue medications.  Since our last visit there have been no hospitalizations or ER visits for their respiratory issues and there does not seem to be anything to suggest unrecognized exacerbations.  I have reviewed the medical regimen and re-educated the pt on the role of rescue and controlling medications.  Inhaler technique and understanding seems adequate.  The patient reports no issues with any of there medications for their COPD.  Refills will be taken care of as needed.  All questions answered.  He does feel that he may be more symptomatic with exertion.  He has gained a little weight as well. He is interested in getting back into exercise and wants a referral to pulmonary rehab.  Does not want to get the flu shot.    2/2/2023 - Here for follow up, he has run out of inhalers and is having trouble  affording any and unfortunately due to administrative decisions we do not have any samples.  He has noted some increased dyspnea since running out of meds.  Still not smoking and using nicotine lozenges.      5/4/2023 - Here for follow up, Pt is currently stable on present medications with no recent increases in their symptoms or use of rescue medications.  Since our last visit there have been no hospitalizations or ER visits for their respiratory issues and there does not seem to be anything to suggest unrecognized exacerbations.  I have reviewed the medical regimen and re-educated the pt on the role of rescue and controlling medications.  Inhaler technique and understanding seems adequate.  The patient reports no issues with any of there medications for their COPD.  Refills will be taken care of as needed.  All questions answered.  Still with issues regarding cost of meds.  Still not smoking.  He is to have right wrist surgery next week    8/3/2023 - Here for follow up, Pt is currently stable on present medications with no recent increases in their symptoms or use of rescue medications.  Since our last visit there have been no hospitalizations or ER visits for their respiratory issues and there does not seem to be anything to suggest unrecognized exacerbations.  I have reviewed the medical regimen and re-educated the pt on the role of rescue and controlling medications.  Inhaler technique and understanding seems adequate.  The patient reports no issues with any of there medications for their COPD.  Refills will be taken care of as needed.  All questions answered.  He has felt a little light headed at times.      11/9/2023 - Here for folow up, Pt is currently stable on present medications with no recent increases in their symptoms or use of rescue medications.  Since our last visit there have been no hospitalizations or ER visits for their respiratory issues and there does not seem to be anything to suggest  unrecognized exacerbations.  I have reviewed the medical regimen and re-educated the pt on the role of rescue and controlling medications.  Inhaler technique and understanding seems adequate.  The patient reports no issues with any of there medications for their COPD.  Refills will be taken care of as needed.  All questions answered.  No new issues reported.      3/6/2024 - Here for follow up, Pt is currently stable on present medications with no recent increases in their symptoms or use of rescue medications.  Since our last visit there have been no hospitalizations or ER visits for their respiratory issues and there does not seem to be anything to suggest unrecognized exacerbations.  I have reviewed the medical regimen and re-educated the pt on the role of rescue and controlling medications.  Inhaler technique and understanding seems adequate.  The patient reports no issues with any of there medications for their COPD.  Refills will be taken care of as needed.  All questions answered.  He did have some issues when there was a delay of getting his meds and that got settled.  We discussed vaccines annd he does not tolerate flu or Covid vaccines.    5/28/2024 - Here for follow up, Pt is currently stable on present medications with no recent increases in their symptoms or use of rescue medications.  Since our last visit there have been no hospitalizations or ER visits for their respiratory issues and there does not seem to be anything to suggest unrecognized exacerbations.  I have reviewed the medical regimen and re-educated the pt on the role of rescue and controlling medications.  Inhaler technique and understanding seems adequate.  The patient reports no issues with any of there medications for their COPD.  Refills will be taken care of as needed.  All questions answered.  He works part time as a .      9/10/2024 - Here for follow up, Pt is currently stable on present medications with no recent increases  in their symptoms or use of rescue medications.  Since our last visit there have been no hospitalizations or ER visits for their respiratory issues and there does not seem to be anything to suggest unrecognized exacerbations.  I have reviewed the medical regimen and re-educated the pt on the role of rescue and controlling medications.  Inhaler technique and understanding seems adequate.  The patient reports no issues with any of there medications for their COPD.  Refills will be taken care of as needed.  All questions answered.  He is in the donut hole and so his costs are up.  He has been started on prozac by his primary.  He has gone back to smoking after leaving his wife but is cutting back and is now at 1-2 cigarettes.  We discussed this.  He has done well with BREZTRI.    12/31/2024 - Here for follow up visit.  Patient is currently stable on present medications with no recent increases in their symptoms or use of rescue medications.  Since our last visit there have been no hospitalizations or ER visits for their respiratory issues and there does not seem to be anything to suggest unrecognized exacerbations.  I have reviewed the medical regimen and re-educated the pt on the role of rescue and controlling medications.  Inhaler technique and understanding seems adequate.  The patient reports no issues with any of there medications for their COPD.  Refills will be taken care of as needed.  All questions answered.  We have discussed potential future therapies or options as appropriate.  He feels like the prozac has been helpful.  He is still smoking occasionally but is working on quitting and we discussed this.  We discussed vaccines and he has not tolerated them in the past and he does not want to get them.    4/1/2025 - Here for follow up visit.  Patient is currently stable on present medications with no recent increases in their symptoms or use of rescue medications.  Since our last visit there have been no  hospitalizations or ER visits for their respiratory issues and there does not seem to be anything to suggest unrecognized exacerbations.  I have reviewed the medical regimen and re-educated the pt on the role of rescue and controlling medications.  Inhaler technique and understanding seems adequate.  The patient reports no issues with any of there medications for their COPD.  Refills will be taken care of as needed.  All questions answered.  We have discussed potential future therapies or options as appropriate.  He is smoking rarely now.        COPD Flowsheet    GOLD III D   Last PFT - 8/2021  FEV1- 37 % DLCO - 64 %       +ICS/LABA/LAMA    + prn NATHAN    mMRC -  0 - SOB with strenuous exercise   -  1 - SOB level ground, slight hill   -  2 - SOB walk slower or stop for breath level ground   - + 3 - SOB at 100 yards or after few minutes   -  4 - SOB in house, dressing    Referral to PULMONARY REHABILITATION - yes          Low Dose Screening CT Chest    Cigarettes - 1-1.5 PPD x 35 YEARS  Still smoking -  NO (quit 2022)    Date of last LD CT - 9/2024    Result - emphysema, small nodules, needs repeat 9/2025    I have discussed with pt about using a screening CT of the chest due to history of cigarette smoking.  We have discussed the possible findings and possible actions as a result of these findings.  The pt would like to proceed.    Past Medical History    Past Medical History:   Diagnosis Date    COPD (chronic obstructive pulmonary disease)     Hepatitis     As a child turned jaundice    Hypertension     Peptic ulcer disease     Wears dentures     upper       Past Surgical History     Past Surgical History:   Procedure Laterality Date    CYST REMOVAL      BY TESTICAL    ROBOT-ASSISTED LAPAROSCOPIC REPAIR OF VENTRAL HERNIA N/A 6/17/2022    Procedure: ROBOTIC REPAIR, HERNIA, VENTRAL;  Surgeon: Phillip Chao III, MD;  [facility];  Service: General;  Laterality: N/A;       Medications      Current Outpatient  Medications:     albuterol (PROVENTIL/VENTOLIN HFA) 90 mcg/actuation inhaler, INHALE 2 PUFFS INTO THE LUNGS EVERY 6  HOURS AS NEEDED FOR SHORTNESS OF BREATH., Disp: 18 g, Rfl: 11    albuterol-ipratropium (DUO-NEB) 2.5 mg-0.5 mg/3 mL nebulizer solution, Take by nebulization every 6 (six) hours as needed., Disp: , Rfl:     ALPRAZolam (XANAX) 1 MG tablet, TAKE ONE TABLET BY MOUTH THREE TIMES A DAY AS NEEDED FOR ANXIETY, Disp: 270 tablet, Rfl: 1    atenoloL (TENORMIN) 25 MG tablet, TAKE 1 TABLET BY MOUTH ONCE A DAY, Disp: 30 tablet, Rfl: 11    BREZTRI AEROSPHERE 160-9-4.8 mcg/actuation HFAA, INHALE 2 PUFFS INTO THE LUNGS TWICE A DAY, Disp: , Rfl:     budesonide-glycopyr-formoterol (BREZTRI AEROSPHERE) 160-9-4.8 mcg/actuation HFAA, Inhale 1 puff into the lungs 2 (two) times daily., Disp: 10.7 g, Rfl: 11    budesonide-glycopyr-formoterol 160-9-4.8 mcg/actuation HFAA, Inhale 2 puffs into the lungs 2 (two) times daily., Disp: 10.7 g, Rfl: 11    chlorhexidine (PERIDEX) 0.12 % solution, SMARTSI.5 Ounce(s) By Mouth Twice Daily, Disp: , Rfl:     FLUoxetine 10 MG capsule, 1 capsule., Disp: , Rfl:     HYDROcodone-acetaminophen (NORCO) 5-325 mg per tablet, Take 1 tablet by mouth every 6 (six) hours as needed for Pain., Disp: 25 tablet, Rfl: 0    ibuprofen (ADVIL,MOTRIN) 800 MG tablet, Take 800 mg by mouth., Disp: , Rfl:     meloxicam (MOBIC) 15 MG tablet, Take 1 tablet (15 mg total) by mouth once daily., Disp: 14 tablet, Rfl: 0    metFORMIN (GLUCOPHAGE-XR) 500 MG ER 24hr tablet, , Disp: , Rfl:     nicotine polacrilex 2 MG Lozg, Take 1 lozenge (2 mg total) by mouth as needed (Use in place of cigarettes)., Disp: 168 lozenge, Rfl: 0    rosuvastatin (CRESTOR) 10 MG tablet, Take 10 mg by mouth., Disp: , Rfl:     sildenafil (REVATIO) 20 mg Tab, Take 20 mg by mouth daily as needed., Disp: , Rfl:     tadalafiL (CIALIS) 20 MG Tab, Take 1/2 tablet (10 mg) oral prn for erectile dysfunction, Disp: 30 tablet, Rfl: 0    tamsulosin (FLOMAX)  0.4 mg Cap, Take 1 capsule (0.4 mg total) by mouth every evening., Disp: 30 capsule, Rfl: 11    TRELEGY ELLIPTA 200-62.5-25 mcg inhaler, Inhale 1 puff into the lungs once daily., Disp: 1 each, Rfl: 11    aspirin (ECOTRIN) 81 MG EC tablet, Every day (Patient not taking: Reported on 2025), Disp: , Rfl:     busPIRone (BUSPAR) 10 MG tablet, Take 10 mg by mouth. (Patient not taking: Reported on 2025), Disp: , Rfl:     dexAMETHasone (DECADRON) 4 MG Tab, Take by mouth. (Patient not taking: Reported on 2025), Disp: , Rfl:     nicotine (NICODERM CQ) 21 mg/24 hr, Place 1 patch onto the skin once daily. (Patient not taking: Reported on 2025), Disp: 28 patch, Rfl: 0    predniSONE (DELTASONE) 20 MG tablet, Take 40 mg by mouth. (Patient not taking: Reported on 2025), Disp: , Rfl:     Allergies    Review of patient's allergies indicates:   Allergen Reactions    Ciprofloxacin Other (See Comments)    Codeine      Other reaction(s): Unknown patient states he never had an allergic reaction    Crestor [rosuvastatin] Other (See Comments)     Stomach soreness    Lipitor [atorvastatin] Other (See Comments)     Stomach soreness      Penicillins      Other reaction(s): Unknown       SocHx    Social History     Tobacco Use   Smoking Status Every Day    Current packs/day: 0.00    Average packs/day: 0.5 packs/day for 34.0 years (17.0 ttl pk-yrs)    Types: Cigarettes    Start date: 1988    Last attempt to quit: 2022    Years since quittin.5   Smokeless Tobacco Never   Tobacco Comments    OCC CIGARETTE       Social History     Substance and Sexual Activity   Alcohol Use Yes    Comment: occasional       Drug Use - no  Occupation -  work x 30 years - retired/disabled  Asbestos exposure - no  Pets - dog    FMHx    No family history on file.      Review of Systems  Review of Systems   Constitutional:  Positive for malaise/fatigue. Negative for chills, diaphoresis, fever and weight loss.   HENT:  Negative  for congestion.    Eyes:  Negative for pain.   Respiratory:  Positive for cough (chronic) and shortness of breath. Negative for hemoptysis, sputum production, wheezing and stridor.    Cardiovascular:  Negative for chest pain, palpitations, orthopnea, claudication, leg swelling and PND.   Gastrointestinal:  Negative for abdominal pain, blood in stool, constipation, diarrhea, heartburn, melena, nausea and vomiting.   Genitourinary:  Negative for dysuria, frequency, hematuria and urgency.   Musculoskeletal:  Negative for falls and myalgias.   Neurological:  Negative for dizziness, tingling, tremors, sensory change, speech change, focal weakness, seizures, loss of consciousness, weakness and headaches.   Psychiatric/Behavioral:  Negative for depression, substance abuse and suicidal ideas. The patient is nervous/anxious.        Physical Exam    Vitals:    04/01/25 1034   BP: (!) 140/84   BP Location: Left arm   Patient Position: Sitting   Pulse: 72   SpO2: 97%   Weight: 89 kg (196 lb 4.8 oz)       Physical Exam  Vitals and nursing note reviewed.   Constitutional:       General: He is not in acute distress.     Appearance: Normal appearance. He is well-developed. He is not ill-appearing, toxic-appearing or diaphoretic.   HENT:      Head: Normocephalic and atraumatic.      Right Ear: External ear normal.      Left Ear: External ear normal.      Nose: Nose normal.   Eyes:      General: No scleral icterus.        Right eye: No discharge.         Left eye: No discharge.      Extraocular Movements: Extraocular movements intact.      Conjunctiva/sclera: Conjunctivae normal.      Pupils: Pupils are equal, round, and reactive to light.   Neck:      Thyroid: No thyromegaly.      Vascular: No JVD.      Trachea: No tracheal deviation.   Cardiovascular:      Rate and Rhythm: Normal rate and regular rhythm.      Heart sounds: Normal heart sounds. No murmur heard.     No friction rub. No gallop.   Pulmonary:      Effort: Pulmonary  effort is normal. No respiratory distress.      Breath sounds: Normal breath sounds. No stridor. No wheezing, rhonchi or rales.      Comments: Decreased BS throughout  No acc m use  Chest:      Chest wall: No tenderness.   Abdominal:      General: Bowel sounds are normal. There is no distension.      Palpations: Abdomen is soft.      Comments: Small periumbilical hernia   Musculoskeletal:         General: No tenderness. Normal range of motion.      Cervical back: Normal range of motion and neck supple. No rigidity or tenderness.      Right lower leg: No edema.      Left lower leg: No edema.   Lymphadenopathy:      Cervical: No cervical adenopathy.   Skin:     General: Skin is warm and dry.      Capillary Refill: Capillary refill takes less than 2 seconds.   Neurological:      General: No focal deficit present.      Mental Status: He is alert and oriented to person, place, and time. Mental status is at baseline.      Cranial Nerves: No cranial nerve deficit.      Motor: No weakness.      Gait: Gait normal.      Deep Tendon Reflexes: Reflexes are normal and symmetric.   Psychiatric:         Mood and Affect: Mood normal.         Behavior: Behavior normal.         Thought Content: Thought content normal.         Judgment: Judgment normal.         Labs    Lab Results   Component Value Date    WBC 6.77 06/14/2022    HGB 14.6 06/14/2022    HCT 43.0 06/14/2022     06/14/2022       Sodium   Date Value Ref Range Status   06/14/2022 139 136 - 145 mmol/L Final     Potassium   Date Value Ref Range Status   06/14/2022 4.7 3.5 - 5.1 mmol/L Final     Chloride   Date Value Ref Range Status   06/14/2022 107 95 - 110 mmol/L Final     CO2   Date Value Ref Range Status   06/14/2022 22 (L) 23 - 29 mmol/L Final     Glucose   Date Value Ref Range Status   06/14/2022 111 (H) 70 - 110 mg/dL Final     BUN   Date Value Ref Range Status   06/14/2022 12 6 - 20 mg/dL Final     Creatinine   Date Value Ref Range Status   06/14/2022 1.1 0.5 -  1.4 mg/dL Final     Calcium   Date Value Ref Range Status   06/14/2022 9.0 8.7 - 10.5 mg/dL Final     Total Protein   Date Value Ref Range Status   06/03/2022 7.8 6.0 - 8.4 g/dL Final     Albumin   Date Value Ref Range Status   11/02/2022 5.0 (H) 3.8 - 4.9 g/dL Final   07/23/2020 4.5 3.6 - 5.1 g/dL Final     Comment:     For additional information, please refer to   http://education.[employer]/faq/JML556 (This link is   being provided for informational/ educational purposes only.)  This test was developed and its analytical performance   characteristics have been determined by [facility]. It has not been cleared or approved by the   [place] Food and Drug Administration. This assay has been validated   pursuant to the CLIA regulations and is used for clinical   purposes.  @ Test Performed By:  [facility]  Jonathan Landers M.D., Ph.D.,   [address] [place] [id number]  [facility]  [id number]       Total Bilirubin   Date Value Ref Range Status   06/03/2022 0.9 0.1 - 1.0 mg/dL Final     Comment:     For infants and newborns, interpretation of results should be based  on gestational age, weight and in agreement with clinical  observations.    Premature Infant recommended reference ranges:  Up to 24 hours.............<8.0 mg/dL  Up to 48 hours............<12.0 mg/dL  3-5 days..................<15.0 mg/dL  6-29 days.................<15.0 mg/dL       Alkaline Phosphatase   Date Value Ref Range Status   06/03/2022 58 55 - 135 U/L Final     AST   Date Value Ref Range Status   06/03/2022 17 10 - 40 U/L Final     ALT   Date Value Ref Range Status   06/03/2022 21 10 - 44 U/L Final     Anion Gap   Date Value Ref Range Status   06/14/2022 10 8 - 16 mmol/L Final       Xrays        Impression/Plan    Problem List Items Addressed This Visit          Pulmonary    COPD (chronic obstructive pulmonary disease), severe - Primary    Continue present  medications.  Will refill medications as needed.  Instructed patient to contact us with any issues concerning their medications (cost, reactions, etc.).  Have discussed with patient about inciting conditions which may exacerbate their disease.  We did discuss possible new therapies or de-escalation of therapy (if appropriate).  Asked patient if they were interested in pursuing pulmonary rehabilitation.  All questions answered  RTC 3 months  Patient instructed that they are to call if symptoms change or new issues develop prior to their next visit.  Will consider NIPPV but we discussed and he does not think he would tolerate wearing the mask               Other    History of smoking, quit 4/2013, 50 pack-years    Has been smoking some since breakup with his wife  We discussed this  He will work on stopping fully                                Asa Morin MD

## 2025-06-25 DIAGNOSIS — N13.8 BPH WITH OBSTRUCTION/LOWER URINARY TRACT SYMPTOMS: ICD-10-CM

## 2025-06-25 DIAGNOSIS — N40.1 BPH WITH OBSTRUCTION/LOWER URINARY TRACT SYMPTOMS: ICD-10-CM

## 2025-06-26 RX ORDER — TAMSULOSIN HYDROCHLORIDE 0.4 MG/1
1 CAPSULE ORAL NIGHTLY
Qty: 30 CAPSULE | Refills: 2 | Status: SHIPPED | OUTPATIENT
Start: 2025-06-26

## 2025-07-02 ENCOUNTER — OFFICE VISIT (OUTPATIENT)
Dept: PULMONOLOGY | Facility: CLINIC | Age: 60
End: 2025-07-02
Payer: MEDICARE

## 2025-07-02 VITALS — OXYGEN SATURATION: 97 % | WEIGHT: 197 LBS | HEART RATE: 70 BPM | BODY MASS INDEX: 30.85 KG/M2

## 2025-07-02 DIAGNOSIS — J44.9 CHRONIC OBSTRUCTIVE PULMONARY DISEASE, UNSPECIFIED COPD TYPE: Primary | ICD-10-CM

## 2025-07-02 DIAGNOSIS — Z87.891 HISTORY OF SMOKING: ICD-10-CM

## 2025-07-02 NOTE — PROGRESS NOTES
Office Visit Note *    Patient Name: Erick Graves Jr.  MRN: [id number]  : 1965      Reason for visit: COPD    HPI:     2021 - 57 yo male has been a pt of Dr Ibanez with COPD (stage IV C), + smoking (quit 2019 about 80 pack years) who is here to establish care.  He has been on BREZTRI and prn NATHAN and is doing well.  He does have issues with anxiety (we discussed this).  He does have a small periumbilical hernia.  Has seen Dr Rao for transplant evaluation in 2019.  Last CT scan that I can find is 2019 - nothing to be concerned about.  Waiting on records from Dr Ibanez's office.  Did reviewed some limited records from Dr Ibanez's office.      2021 - Here for follow up, Pt is currently stable on present medications with no recent increases in their symptoms or use of rescue medications.  Since our last visit there have been no hospitalizations or ER visits for their respiratory issues and there does not seem to be anything to suggest unrecognized exacerbations.  I have reviewed the medical regimen and re-educated the pt on the role of rescue and controlling medications.  Inhaler technique and understanding seems adequate.  The patient reports no issues with any of there medications for their COPD.  Refills will be taken care of as needed.  All questions answered.  Has a probable small periumbilical hernia that he wants evaluated.   Reports that he had Covid a few months ago but did not get very sick.    10/6/2021 - Here for follow up, Pt is currently stable on present medications with no recent increases in their symptoms or use of rescue medications.  Since our last visit there have been no hospitalizations or ER visits for their respiratory issues and there does not seem to be anything to suggest unrecognized exacerbations.  I have reviewed the medical regimen and re-educated the pt on the role of rescue and controlling medications.  Inhaler technique and understanding seems adequate.  The  patient reports no issues with any of there medications for their COPD.  Refills will be taken care of as needed.  All questions answered.    2/3/2022 - Here for follow, Pt is currently stable on present medications with no recent increases in their symptoms or use of rescue medications.  Since our last visit there have been no hospitalizations or ER visits for their respiratory issues and there does not seem to be anything to suggest unrecognized exacerbations.  I have reviewed the medical regimen and re-educated the pt on the role of rescue and controlling medications.  Inhaler technique and understanding seems adequate.  The patient reports no issues with any of there medications for their COPD.  Refills will be taken care of as needed.  All questions answered.  Patient has no known corona virus exposures and has been practicing social distancing.  We have discussed the virus and precautions and all questions have been answered.  He did have Covid during the first wave.  Has had 1 Covid vaccine but got very sick with it.    5/5/2022 - Here for follow up, Pt is currently stable on present medications with no recent increases in their symptoms or use of rescue medications.  Since our last visit there have been no hospitalizations or ER visits for their respiratory issues and there does not seem to be anything to suggest unrecognized exacerbations.  I have reviewed the medical regimen and re-educated the pt on the role of rescue and controlling medications.  Inhaler technique and understanding seems adequate.  The patient reports no issues with any of there medications for their COPD.  Refills will be taken care of as needed.  All questions answered.  Has had some increase in SOB, CARUSO and congestion but has not been taking BREZTRI regularly due to donut hole issues (we gave samples).  Patient has no known corona virus exposures and has been practicing social distancing.  We have discussed the virus and precautions  and all questions have been answered.  When he was smoking he smoked 1-1.5 PPD for about 35 years.    8/3/2022 - Here for follow up, Pt is currently stable on present medications with no recent increases in their symptoms or use of rescue medications.  Since our last visit there have been no hospitalizations or ER visits for their respiratory issues and there does not seem to be anything to suggest unrecognized exacerbations.  I have reviewed the medical regimen and re-educated the pt on the role of rescue and controlling medications.  Inhaler technique and understanding seems adequate.  The patient reports no issues with any of there medications for their COPD.  Refills will be taken care of as needed.  All questions answered.  Out of Breztri for a few days and some increased SOB.  Has had hernia surgery and did well.  Patient has no known corona virus exposures and has been practicing social distancing.  We have discussed the virus and precautions and all questions have been answered.    11/1/2022 - Here for follow up, Pt is currently stable on present medications with no recent increases in their symptoms or use of rescue medications.  Since our last visit there have been no hospitalizations or ER visits for their respiratory issues and there does not seem to be anything to suggest unrecognized exacerbations.  I have reviewed the medical regimen and re-educated the pt on the role of rescue and controlling medications.  Inhaler technique and understanding seems adequate.  The patient reports no issues with any of there medications for their COPD.  Refills will be taken care of as needed.  All questions answered.  He does feel that he may be more symptomatic with exertion.  He has gained a little weight as well. He is interested in getting back into exercise and wants a referral to pulmonary rehab.  Does not want to get the flu shot.    2/2/2023 - Here for follow up, he has run out of inhalers and is having trouble  affording any and unfortunately due to administrative decisions we do not have any samples.  He has noted some increased dyspnea since running out of meds.  Still not smoking and using nicotine lozenges.      5/4/2023 - Here for follow up, Pt is currently stable on present medications with no recent increases in their symptoms or use of rescue medications.  Since our last visit there have been no hospitalizations or ER visits for their respiratory issues and there does not seem to be anything to suggest unrecognized exacerbations.  I have reviewed the medical regimen and re-educated the pt on the role of rescue and controlling medications.  Inhaler technique and understanding seems adequate.  The patient reports no issues with any of there medications for their COPD.  Refills will be taken care of as needed.  All questions answered.  Still with issues regarding cost of meds.  Still not smoking.  He is to have right wrist surgery next week    8/3/2023 - Here for follow up, Pt is currently stable on present medications with no recent increases in their symptoms or use of rescue medications.  Since our last visit there have been no hospitalizations or ER visits for their respiratory issues and there does not seem to be anything to suggest unrecognized exacerbations.  I have reviewed the medical regimen and re-educated the pt on the role of rescue and controlling medications.  Inhaler technique and understanding seems adequate.  The patient reports no issues with any of there medications for their COPD.  Refills will be taken care of as needed.  All questions answered.  He has felt a little light headed at times.      11/9/2023 - Here for folow up, Pt is currently stable on present medications with no recent increases in their symptoms or use of rescue medications.  Since our last visit there have been no hospitalizations or ER visits for their respiratory issues and there does not seem to be anything to suggest  unrecognized exacerbations.  I have reviewed the medical regimen and re-educated the pt on the role of rescue and controlling medications.  Inhaler technique and understanding seems adequate.  The patient reports no issues with any of there medications for their COPD.  Refills will be taken care of as needed.  All questions answered.  No new issues reported.      3/6/2024 - Here for follow up, Pt is currently stable on present medications with no recent increases in their symptoms or use of rescue medications.  Since our last visit there have been no hospitalizations or ER visits for their respiratory issues and there does not seem to be anything to suggest unrecognized exacerbations.  I have reviewed the medical regimen and re-educated the pt on the role of rescue and controlling medications.  Inhaler technique and understanding seems adequate.  The patient reports no issues with any of there medications for their COPD.  Refills will be taken care of as needed.  All questions answered.  He did have some issues when there was a delay of getting his meds and that got settled.  We discussed vaccines annd he does not tolerate flu or Covid vaccines.    5/28/2024 - Here for follow up, Pt is currently stable on present medications with no recent increases in their symptoms or use of rescue medications.  Since our last visit there have been no hospitalizations or ER visits for their respiratory issues and there does not seem to be anything to suggest unrecognized exacerbations.  I have reviewed the medical regimen and re-educated the pt on the role of rescue and controlling medications.  Inhaler technique and understanding seems adequate.  The patient reports no issues with any of there medications for their COPD.  Refills will be taken care of as needed.  All questions answered.  He works part time as a .      9/10/2024 - Here for follow up, Pt is currently stable on present medications with no recent increases  in their symptoms or use of rescue medications.  Since our last visit there have been no hospitalizations or ER visits for their respiratory issues and there does not seem to be anything to suggest unrecognized exacerbations.  I have reviewed the medical regimen and re-educated the pt on the role of rescue and controlling medications.  Inhaler technique and understanding seems adequate.  The patient reports no issues with any of there medications for their COPD.  Refills will be taken care of as needed.  All questions answered.  He is in the donut hole and so his costs are up.  He has been started on prozac by his primary.  He has gone back to smoking after leaving his wife but is cutting back and is now at 1-2 cigarettes.  We discussed this.  He has done well with BREZTRI.    12/31/2024 - Here for follow up visit.  Patient is currently stable on present medications with no recent increases in their symptoms or use of rescue medications.  Since our last visit there have been no hospitalizations or ER visits for their respiratory issues and there does not seem to be anything to suggest unrecognized exacerbations.  I have reviewed the medical regimen and re-educated the pt on the role of rescue and controlling medications.  Inhaler technique and understanding seems adequate.  The patient reports no issues with any of there medications for their COPD.  Refills will be taken care of as needed.  All questions answered.  We have discussed potential future therapies or options as appropriate.  He feels like the prozac has been helpful.  He is still smoking occasionally but is working on quitting and we discussed this.  We discussed vaccines and he has not tolerated them in the past and he does not want to get them.    4/1/2025 - Here for follow up visit.  Patient is currently stable on present medications with no recent increases in their symptoms or use of rescue medications.  Since our last visit there have been no  hospitalizations or ER visits for their respiratory issues and there does not seem to be anything to suggest unrecognized exacerbations.  I have reviewed the medical regimen and re-educated the pt on the role of rescue and controlling medications.  Inhaler technique and understanding seems adequate.  The patient reports no issues with any of there medications for their COPD.  Refills will be taken care of as needed.  All questions answered.  We have discussed potential future therapies or options as appropriate.  He is smoking rarely now.      7/2/2025 - Here for follow up visit.  Patient is currently stable on present medications with no recent increases in their symptoms or use of rescue medications.  Since our last visit there have been no hospitalizations or ER visits for their respiratory issues and there does not seem to be anything to suggest unrecognized exacerbations.  I have reviewed the medical regimen and re-educated the pt on the role of rescue and controlling medications.  Inhaler technique and understanding seems adequate.  The patient reports no issues with any of there medications for their COPD.  Refills will be taken care of as needed.  All questions answered.  We have discussed potential future therapies or options as appropriate.  He is smoking about 4-5 cigarettes, he does use nicotine mints.  He has some issues with ED and he is to see urology.        COPD Flowsheet    GOLD III D   Last PFT - 8/2021  FEV1- 37 % DLCO - 64 %       +ICS/LABA/LAMA    + prn NATHAN    mMRC -  0 - SOB with strenuous exercise   -  1 - SOB level ground, slight hill   -  2 - SOB walk slower or stop for breath level ground   - + 3 - SOB at 100 yards or after few minutes   -  4 - SOB in house, dressing    Referral to PULMONARY REHABILITATION - yes          Low Dose Screening CT Chest    Cigarettes - 1-1.5 PPD x 35 YEARS  Still smoking -  NO (quit 2022)    Date of last LD CT - 9/2024    Result - emphysema, small nodules,  needs repeat 2025    I have discussed with pt about using a screening CT of the chest due to history of cigarette smoking.  We have discussed the possible findings and possible actions as a result of these findings.  The pt would like to proceed.    Past Medical History    Past Medical History:   Diagnosis Date    COPD (chronic obstructive pulmonary disease)     Hepatitis     As a child turned jaundice    Hypertension     Peptic ulcer disease     Wears dentures     upper       Past Surgical History     Past Surgical History:   Procedure Laterality Date    CYST REMOVAL      BY TESTICAL    ROBOT-ASSISTED LAPAROSCOPIC REPAIR OF VENTRAL HERNIA N/A 2022    Procedure: ROBOTIC REPAIR, HERNIA, VENTRAL;  Surgeon: Phillip Chao III, MD;  Location: [facility];  Service: General;  Laterality: N/A;       Medications      Current Outpatient Medications:     albuterol (PROVENTIL/VENTOLIN HFA) 90 mcg/actuation inhaler, INHALE 2 PUFFS INTO THE LUNGS EVERY 6  HOURS AS NEEDED FOR SHORTNESS OF BREATH., Disp: 18 g, Rfl: 11    albuterol-ipratropium (DUO-NEB) 2.5 mg-0.5 mg/3 mL nebulizer solution, Take by nebulization every 6 (six) hours as needed., Disp: , Rfl:     ALPRAZolam (XANAX) 1 MG tablet, TAKE ONE TABLET BY MOUTH THREE TIMES A DAY AS NEEDED FOR ANXIETY, Disp: 270 tablet, Rfl: 1    atenoloL (TENORMIN) 25 MG tablet, TAKE 1 TABLET BY MOUTH ONCE A DAY, Disp: 30 tablet, Rfl: 11    BREZTRI AEROSPHERE 160-9-4.8 mcg/actuation HFAA, INHALE 2 PUFFS INTO THE LUNGS TWICE A DAY, Disp: , Rfl:     budesonide-glycopyr-formoterol (BREZTRI AEROSPHERE) 160-9-4.8 mcg/actuation HFAA, Inhale 1 puff into the lungs 2 (two) times daily., Disp: 10.7 g, Rfl: 11    budesonide-glycopyr-formoterol 160-9-4.8 mcg/actuation HFAA, Inhale 2 puffs into the lungs 2 (two) times daily., Disp: 10.7 g, Rfl: 11    chlorhexidine (PERIDEX) 0.12 % solution, SMARTSI.5 Ounce(s) By Mouth Twice Daily, Disp: , Rfl:     FLUoxetine 10 MG capsule, 1 capsule., Disp: ,  Rfl:     HYDROcodone-acetaminophen (NORCO) 5-325 mg per tablet, Take 1 tablet by mouth every 6 (six) hours as needed for Pain., Disp: 25 tablet, Rfl: 0    ibuprofen (ADVIL,MOTRIN) 800 MG tablet, Take 800 mg by mouth., Disp: , Rfl:     meloxicam (MOBIC) 15 MG tablet, Take 1 tablet (15 mg total) by mouth once daily., Disp: 14 tablet, Rfl: 0    metFORMIN (GLUCOPHAGE-XR) 500 MG ER 24hr tablet, , Disp: , Rfl:     nicotine polacrilex 2 MG Lozg, Take 1 lozenge (2 mg total) by mouth as needed (Use in place of cigarettes)., Disp: 168 lozenge, Rfl: 0    predniSONE (DELTASONE) 20 MG tablet, Take 40 mg by mouth., Disp: , Rfl:     rosuvastatin (CRESTOR) 10 MG tablet, Take 10 mg by mouth., Disp: , Rfl:     sildenafil (REVATIO) 20 mg Tab, Take 20 mg by mouth daily as needed., Disp: , Rfl:     tadalafiL (CIALIS) 20 MG Tab, Take 1/2 tablet (10 mg) oral prn for erectile dysfunction, Disp: 30 tablet, Rfl: 0    tamsulosin (FLOMAX) 0.4 mg Cap, TAKE 1 CAPSULE BY MOUTH EVERY EVENING., Disp: 30 capsule, Rfl: 2    TRELEGY ELLIPTA 200-62.5-25 mcg inhaler, Inhale 1 puff into the lungs once daily., Disp: 1 each, Rfl: 11    aspirin (ECOTRIN) 81 MG EC tablet, Every day (Patient not taking: Reported on 7/2/2025), Disp: , Rfl:     busPIRone (BUSPAR) 10 MG tablet, Take 10 mg by mouth. (Patient not taking: Reported on 7/2/2025), Disp: , Rfl:     dexAMETHasone (DECADRON) 4 MG Tab, Take by mouth. (Patient not taking: Reported on 7/2/2025), Disp: , Rfl:     nicotine (NICODERM CQ) 21 mg/24 hr, Place 1 patch onto the skin once daily. (Patient not taking: Reported on 7/2/2025), Disp: 28 patch, Rfl: 0    Allergies    Review of patient's allergies indicates:   Allergen Reactions    Ciprofloxacin Other (See Comments)    Codeine      Other reaction(s): Unknown patient states he never had an allergic reaction    Crestor [rosuvastatin] Other (See Comments)     Stomach soreness    Lipitor [atorvastatin] Other (See Comments)     Stomach soreness      Penicillins       Other reaction(s): Unknown       SocHx    Social History     Tobacco Use   Smoking Status Every Day    Current packs/day: 0.00    Average packs/day: 0.5 packs/day for 34.0 years (17.0 ttl pk-yrs)    Types: Cigarettes    Start date: 1988    Last attempt to quit: 2022    Years since quittin.8   Smokeless Tobacco Never   Tobacco Comments    OCC CIGARETTE       Social History     Substance and Sexual Activity   Alcohol Use Yes    Comment: occasional       Drug Use - no  Occupation -  work x 30 years - retired/disabled  Asbestos exposure - no  Pets - dog    FMHx    No family history on file.      Review of Systems  Review of Systems   Constitutional:  Positive for malaise/fatigue. Negative for chills, diaphoresis, fever and weight loss.   HENT:  Negative for congestion.    Eyes:  Negative for pain.   Respiratory:  Positive for cough (chronic) and shortness of breath. Negative for hemoptysis, sputum production, wheezing and stridor.    Cardiovascular:  Negative for chest pain, palpitations, orthopnea, claudication, leg swelling and PND.   Gastrointestinal:  Negative for abdominal pain, blood in stool, constipation, diarrhea, heartburn, melena, nausea and vomiting.   Genitourinary:  Negative for dysuria, frequency, hematuria and urgency.   Musculoskeletal:  Negative for falls and myalgias.   Neurological:  Negative for dizziness, tingling, tremors, sensory change, speech change, focal weakness, seizures, loss of consciousness, weakness and headaches.   Psychiatric/Behavioral:  Negative for depression, substance abuse and suicidal ideas. The patient is nervous/anxious.        Physical Exam    Vitals:    25 1312   BP: (!) (P) 140/82   BP Location: Left arm   Patient Position: Sitting   Pulse: 70   SpO2: 97%   Weight: 89.4 kg (197 lb)       Physical Exam  Vitals and nursing note reviewed.   Constitutional:       General: He is not in acute distress.     Appearance: Normal appearance. He is  well-developed. He is not ill-appearing, toxic-appearing or diaphoretic.   HENT:      Head: Normocephalic and atraumatic.      Right Ear: External ear normal.      Left Ear: External ear normal.      Nose: Nose normal.   Eyes:      General: No scleral icterus.        Right eye: No discharge.         Left eye: No discharge.      Extraocular Movements: Extraocular movements intact.      Conjunctiva/sclera: Conjunctivae normal.      Pupils: Pupils are equal, round, and reactive to light.   Neck:      Thyroid: No thyromegaly.      Vascular: No JVD.      Trachea: No tracheal deviation.   Cardiovascular:      Rate and Rhythm: Normal rate and regular rhythm.      Heart sounds: Normal heart sounds. No murmur heard.     No friction rub. No gallop.   Pulmonary:      Effort: Pulmonary effort is normal. No respiratory distress.      Breath sounds: Normal breath sounds. No stridor. No wheezing, rhonchi or rales.      Comments: Decreased BS throughout  No acc m use  Chest:      Chest wall: No tenderness.   Abdominal:      General: Bowel sounds are normal. There is no distension.      Palpations: Abdomen is soft.      Comments: Small periumbilical hernia   Musculoskeletal:         General: No tenderness. Normal range of motion.      Cervical back: Normal range of motion and neck supple. No rigidity or tenderness.      Right lower leg: No edema.      Left lower leg: No edema.   Lymphadenopathy:      Cervical: No cervical adenopathy.   Skin:     General: Skin is warm and dry.      Capillary Refill: Capillary refill takes less than 2 seconds.   Neurological:      General: No focal deficit present.      Mental Status: He is alert and oriented to person, place, and time. Mental status is at baseline.      Cranial Nerves: No cranial nerve deficit.      Motor: No weakness.      Gait: Gait normal.      Deep Tendon Reflexes: Reflexes are normal and symmetric.   Psychiatric:         Mood and Affect: Mood normal.         Behavior: Behavior  normal.         Thought Content: Thought content normal.         Judgment: Judgment normal.         Labs    Lab Results   Component Value Date    WBC 6.77 06/14/2022    HGB 14.6 06/14/2022    HCT 43.0 06/14/2022     06/14/2022       Sodium   Date Value Ref Range Status   06/14/2022 139 136 - 145 mmol/L Final     Potassium   Date Value Ref Range Status   06/14/2022 4.7 3.5 - 5.1 mmol/L Final     Chloride   Date Value Ref Range Status   06/14/2022 107 95 - 110 mmol/L Final     CO2   Date Value Ref Range Status   06/14/2022 22 (L) 23 - 29 mmol/L Final     Glucose   Date Value Ref Range Status   06/14/2022 111 (H) 70 - 110 mg/dL Final     BUN   Date Value Ref Range Status   06/14/2022 12 6 - 20 mg/dL Final     Creatinine   Date Value Ref Range Status   06/14/2022 1.1 0.5 - 1.4 mg/dL Final     Calcium   Date Value Ref Range Status   06/14/2022 9.0 8.7 - 10.5 mg/dL Final     Total Protein   Date Value Ref Range Status   06/03/2022 7.8 6.0 - 8.4 g/dL Final     Albumin   Date Value Ref Range Status   11/02/2022 5.0 (H) 3.8 - 4.9 g/dL Final   07/23/2020 4.5 3.6 - 5.1 g/dL Final     Comment:     For additional information, please refer to   http://education.[employer]/faq/UYB134 (This link is   being provided for informational/ educational purposes only.)  This test was developed and its analytical performance   characteristics have been determined by [facility]. It has not been cleared or approved by the   [place] Food and Drug Administration. This assay has been validated   pursuant to the CLIA regulations and is used for clinical   purposes.  @ Test Performed By:  [employer] [facility]  Jonathan Landers M.D., Ph.D.,   [address] [place] [id number]  CLIA  [id number]       Total Bilirubin   Date Value Ref Range Status   06/03/2022 0.9 0.1 - 1.0 mg/dL Final     Comment:     For infants and newborns, interpretation of results should  be based  on gestational age, weight and in agreement with clinical  observations.    Premature Infant recommended reference ranges:  Up to 24 hours.............<8.0 mg/dL  Up to 48 hours............<12.0 mg/dL  3-5 days..................<15.0 mg/dL  6-29 days.................<15.0 mg/dL       Alkaline Phosphatase   Date Value Ref Range Status   06/03/2022 58 55 - 135 U/L Final     AST   Date Value Ref Range Status   06/03/2022 17 10 - 40 U/L Final     ALT   Date Value Ref Range Status   06/03/2022 21 10 - 44 U/L Final     Anion Gap   Date Value Ref Range Status   06/14/2022 10 8 - 16 mmol/L Final       Xrays        Impression/Plan    Problem List Items Addressed This Visit          Pulmonary    COPD (chronic obstructive pulmonary disease), severe - Primary    Continue present medications.  Will refill medications as needed.  Instructed patient to contact us with any issues concerning their medications (cost, reactions, etc.).  Have discussed with patient about inciting conditions which may exacerbate their disease.  We did discuss possible new therapies or de-escalation of therapy (if appropriate).  Asked patient if they were interested in pursuing pulmonary rehabilitation.  All questions answered  RTC 3 months  Patient instructed that they are to call if symptoms change or new issues develop prior to their next visit.  Will consider NIPPV but we discussed and he does not think he would tolerate wearing the mask               Other    History of smoking, quit 4/2013, 50 pack-years    Has been smoking some since breakup with his wife  We discussed this  He will work on stopping fully                                  Asa Morin MD

## 2025-07-21 ENCOUNTER — TELEPHONE (OUTPATIENT)
Dept: DERMATOLOGY | Facility: CLINIC | Age: 60
End: 2025-07-21
Payer: MEDICARE

## 2025-07-21 NOTE — TELEPHONE ENCOUNTER
Copied from CRM #[id number]. Topic: Appointments - Appointment Access  >> Jul 21, 2025  2:57 PM Forrest wrote:  Type:  Sooner Appointment Request    Caller is requesting a sooner appointment.  Caller declined first available appointment listed below.  Caller will not accept being placed on the waitlist and is requesting a message be sent to doctor.  Name of Caller:Pt  When is the first available appointment?Dept schedules  Symptoms:Growth on Arm  Would the patient rather a call back or a response via [facility]? Call  Best Call Back Number:[phone]   Additional Information:   New pt need to schedule

## 2025-07-24 ENCOUNTER — DOCUMENTATION ONLY (OUTPATIENT)
Dept: PULMONOLOGY | Facility: CLINIC | Age: 60
End: 2025-07-24
Payer: MEDICARE

## 2025-07-24 NOTE — PROGRESS NOTES
Patient called an refilled Albuterol rescue inhaler at [facility] in [place]

## 2025-08-18 ENCOUNTER — LAB VISIT (OUTPATIENT)
Dept: LAB | Facility: HOSPITAL | Age: 60
End: 2025-08-18
Attending: NURSE PRACTITIONER
Payer: MEDICARE

## 2025-08-18 ENCOUNTER — OFFICE VISIT (OUTPATIENT)
Dept: UROLOGY | Facility: CLINIC | Age: 60
End: 2025-08-18
Payer: MEDICARE

## 2025-08-18 VITALS — BODY MASS INDEX: 30.93 KG/M2 | WEIGHT: 197.06 LBS | HEIGHT: 67 IN

## 2025-08-18 DIAGNOSIS — Z12.5 SCREENING FOR PROSTATE CANCER: ICD-10-CM

## 2025-08-18 DIAGNOSIS — N52.9 ERECTILE DYSFUNCTION, UNSPECIFIED ERECTILE DYSFUNCTION TYPE: ICD-10-CM

## 2025-08-18 DIAGNOSIS — N40.1 BPH WITH OBSTRUCTION/LOWER URINARY TRACT SYMPTOMS: Primary | ICD-10-CM

## 2025-08-18 DIAGNOSIS — N13.8 BPH WITH OBSTRUCTION/LOWER URINARY TRACT SYMPTOMS: Primary | ICD-10-CM

## 2025-08-18 LAB
POC RESIDUAL URINE VOLUME: 14 ML (ref 0–100)
PSA SERPL-MCNC: 0.31 NG/ML

## 2025-08-18 PROCEDURE — 1159F MED LIST DOCD IN RCRD: CPT | Mod: CPTII,S$GLB,, | Performed by: NURSE PRACTITIONER

## 2025-08-18 PROCEDURE — 84153 ASSAY OF PSA TOTAL: CPT

## 2025-08-18 PROCEDURE — 3008F BODY MASS INDEX DOCD: CPT | Mod: CPTII,S$GLB,, | Performed by: NURSE PRACTITIONER

## 2025-08-18 PROCEDURE — 51798 US URINE CAPACITY MEASURE: CPT | Mod: S$GLB,,, | Performed by: NURSE PRACTITIONER

## 2025-08-18 PROCEDURE — 36415 COLL VENOUS BLD VENIPUNCTURE: CPT

## 2025-08-18 PROCEDURE — G2211 COMPLEX E/M VISIT ADD ON: HCPCS | Mod: S$GLB,,, | Performed by: NURSE PRACTITIONER

## 2025-08-18 PROCEDURE — 99999 PR PBB SHADOW E&M-EST. PATIENT-LVL IV: CPT | Mod: PBBFAC,,, | Performed by: NURSE PRACTITIONER

## 2025-08-18 PROCEDURE — 1160F RVW MEDS BY RX/DR IN RCRD: CPT | Mod: CPTII,S$GLB,, | Performed by: NURSE PRACTITIONER

## 2025-08-18 PROCEDURE — 99214 OFFICE O/P EST MOD 30 MIN: CPT | Mod: S$GLB,,, | Performed by: NURSE PRACTITIONER

## 2025-08-18 RX ORDER — TADALAFIL 20 MG/1
TABLET ORAL
Qty: 30 TABLET | Refills: 0 | Status: SHIPPED | OUTPATIENT
Start: 2025-08-18

## 2025-08-18 RX ORDER — TAMSULOSIN HYDROCHLORIDE 0.4 MG/1
1 CAPSULE ORAL NIGHTLY
Qty: 90 CAPSULE | Refills: 3 | Status: SHIPPED | OUTPATIENT
Start: 2025-08-18

## 2025-08-27 ENCOUNTER — OFFICE VISIT (OUTPATIENT)
Dept: DERMATOLOGY | Facility: CLINIC | Age: 60
End: 2025-08-27
Payer: MEDICARE

## 2025-08-27 VITALS — WEIGHT: 197.06 LBS | HEIGHT: 67 IN | BODY MASS INDEX: 30.93 KG/M2

## 2025-08-27 DIAGNOSIS — D48.5 NEOPLASM OF UNCERTAIN BEHAVIOR OF SKIN: Primary | ICD-10-CM

## 2025-08-27 PROCEDURE — 11102 TANGNTL BX SKIN SINGLE LES: CPT | Mod: S$GLB,,, | Performed by: DERMATOLOGY

## 2025-08-27 PROCEDURE — 99499 UNLISTED E&M SERVICE: CPT | Mod: S$GLB,,, | Performed by: DERMATOLOGY

## 2025-08-27 PROCEDURE — 88305 TISSUE EXAM BY PATHOLOGIST: CPT | Mod: TC | Performed by: PATHOLOGY

## 2025-08-29 LAB
ESTROGEN SERPL-MCNC: NORMAL PG/ML
INSULIN SERPL-ACNC: NORMAL U[IU]/ML
LAB AP CLINICAL INFORMATION: NORMAL
LAB AP GROSS DESCRIPTION: NORMAL
LAB AP PERFORMING LOCATION(S): NORMAL
LAB AP REPORT FOOTNOTES: NORMAL
T3RU NFR SERPL: NORMAL %

## (undated) DEVICE — ELECTRODE REM PLYHSV RETURN 9

## (undated) DEVICE — UNDERGLOVE BIOGEL PI SZ 6.5 LF

## (undated) DEVICE — SEAL UNIVERSAL 5MM-8MM XI

## (undated) DEVICE — DRAPE ARM DAVINCI XI

## (undated) DEVICE — SUT VLOC 1 12IN 1/2 CIR GS-21

## (undated) DEVICE — SUT MONOCRYL 4-0 PS-2

## (undated) DEVICE — GLOVE SURGEONS ULTRA TOUCH 6.5

## (undated) DEVICE — APPLICATOR CHLORAPREP ORN 26ML

## (undated) DEVICE — OBTURATOR BLADELESS 8MM XI CLR

## (undated) DEVICE — SYR ONLY LUER LOCK 20CC

## (undated) DEVICE — SLEEVE SCD EXPRESS KNEE MEDIUM

## (undated) DEVICE — SOL ELECTROLUBE ANTI-STIC

## (undated) DEVICE — SEE MEDLINE ITEM 157117

## (undated) DEVICE — PACK CUSTOM UNIV BASIN SLI

## (undated) DEVICE — UNDERGLOVES BIOGEL PI SIZE 8

## (undated) DEVICE — DRAPE COLUMN DAVINCI XI

## (undated) DEVICE — TOWEL OR DISP STRL BLUE 4/PK

## (undated) DEVICE — STRAP OR TABLE 5IN X 72IN

## (undated) DEVICE — SOL CLEARIFY VISUALIZATION LAP

## (undated) DEVICE — SOL 9P NACL IRR PIC IL

## (undated) DEVICE — PACK BASIC

## (undated) DEVICE — NDL PNEUMO INSUFFLATI 120MM

## (undated) DEVICE — GLOVE SURG ULTRA TOUCH 7.5

## (undated) DEVICE — SYR 10CC LUER LOCK

## (undated) DEVICE — SUT V-LOC 180 2-0 GS-22 9IN

## (undated) DEVICE — SET TUBE PNEUMOCLEAR SE HI FLO

## (undated) DEVICE — NDL INJETAK ADJ TIP 35CM

## (undated) DEVICE — COVER TIP CURVED SCISSORS XI

## (undated) DEVICE — DRAPE ABDOMINAL TIBURON 14X11

## (undated) DEVICE — SOL WATER STRL IRR 1000ML

## (undated) DEVICE — NDL SAFETY 21G X 1 1/2 ECLPSE

## (undated) DEVICE — GLOVE SURG ULTRA TOUCH 7

## (undated) DEVICE — CLOSURE SKIN STERI STRIP 1/2X4

## (undated) DEVICE — TROCAR ENDO Z THREAD KII 5X100

## (undated) DEVICE — UNDERGLOVES BIOGEL PI SZ 7 LF

## (undated) DEVICE — SEE MEDLINE ITEM 146292